# Patient Record
Sex: FEMALE | Race: WHITE | Employment: OTHER | URBAN - METROPOLITAN AREA
[De-identification: names, ages, dates, MRNs, and addresses within clinical notes are randomized per-mention and may not be internally consistent; named-entity substitution may affect disease eponyms.]

---

## 2017-03-23 ENCOUNTER — OFFICE VISIT (OUTPATIENT)
Dept: INTERNAL MEDICINE CLINIC | Age: 67
End: 2017-03-23

## 2017-03-23 VITALS
TEMPERATURE: 97.5 F | RESPIRATION RATE: 18 BRPM | OXYGEN SATURATION: 97 % | HEART RATE: 55 BPM | SYSTOLIC BLOOD PRESSURE: 132 MMHG | HEIGHT: 64 IN | WEIGHT: 167.2 LBS | BODY MASS INDEX: 28.54 KG/M2 | DIASTOLIC BLOOD PRESSURE: 83 MMHG

## 2017-03-23 DIAGNOSIS — Z12.31 ENCOUNTER FOR SCREENING MAMMOGRAM FOR BREAST CANCER: ICD-10-CM

## 2017-03-23 DIAGNOSIS — M85.80 AGE-RELATED BONE LOSS: ICD-10-CM

## 2017-03-23 DIAGNOSIS — R73.09 ELEVATED GLUCOSE: ICD-10-CM

## 2017-03-23 DIAGNOSIS — E53.8 B12 DEFICIENCY: ICD-10-CM

## 2017-03-23 DIAGNOSIS — I10 ESSENTIAL HYPERTENSION: Primary | ICD-10-CM

## 2017-03-23 DIAGNOSIS — L65.9 HAIR THINNING: ICD-10-CM

## 2017-03-23 DIAGNOSIS — M85.80 OSTEOPENIA: ICD-10-CM

## 2017-03-23 DIAGNOSIS — Z00.00 GENERAL MEDICAL EXAM: ICD-10-CM

## 2017-03-23 DIAGNOSIS — Z11.59 NEED FOR HEPATITIS C SCREENING TEST: ICD-10-CM

## 2017-03-23 DIAGNOSIS — Z13.220 SCREENING FOR CHOLESTEROL LEVEL: ICD-10-CM

## 2017-03-23 DIAGNOSIS — E78.00 HIGH CHOLESTEROL: ICD-10-CM

## 2017-03-23 RX ORDER — TRAMADOL HYDROCHLORIDE 50 MG/1
50 TABLET ORAL
Qty: 30 TAB | Refills: 1 | Status: SHIPPED | OUTPATIENT
Start: 2017-03-23 | End: 2017-10-27 | Stop reason: SDUPTHER

## 2017-03-23 RX ORDER — VALSARTAN AND HYDROCHLOROTHIAZIDE 320; 25 MG/1; MG/1
1 TABLET, FILM COATED ORAL DAILY
COMMUNITY
End: 2017-10-27 | Stop reason: SDUPTHER

## 2017-03-23 NOTE — PROGRESS NOTES
Chief Complaint   Patient presents with   [facility] Establish Care     new patient     1. Have you been to the ER, urgent care clinic since your last visit? Hospitalized since your last visit? No    2. Have you seen or consulted any other health care providers outside of the [address] [place] [address] since your last visit? Include any pap smears or colon screening.  No

## 2017-03-23 NOTE — PROGRESS NOTES
Ms. Fabby Mayes is a new patient who is here to establish care. CC:  Establish Care (new patient)  Back pain        HPI:  Previous MD lost license - Dr Thai Mathews     HTN: currently on diovan- HCT. Tolerating medication well. Denies chest pain and shortness of breath. BP well controlled. Respiratory complaints: patient feels that at times has difficulty taking deep breaths. Had allergy testing in the past -  Allergy testing was negative. lung nodule hx on an x ray - repeat x ray was negative - I reviewed report. Currently not having dyspnea. Teague\"s esophagus? ? Questionable on endoscopy - 2cm - Dr Socorro Henriquez MD - doubts true barretts. on August 2014 - patient stopped PPI on her own. Advised to discuss with him. Denies acid reflux symptoms   Nausea hx: taken omeprazole in the past. Currently not having symptoms. Dr Wili Fountain did endoscopy. See above    Vertigo hx: worked up with MRI brain and  normal. Last type had symptosm of vertigo - 1 year ago. NICOLÁS: uses the CPAP daily. Cataract hx / Glaucoma - patient is scheduled to see eye MD - plans to have May 10th - cataract surgery -     Elevated cholesterol: on crestor - last checked one year ago ahd LDL was 87 and HDL was 64    Back pain: chronic for years. Had injury as child fell of a swing and after having children patient started having back pain. Patient had imaging in the past. \" has degeneration of disc\". Pain is triggered by cleaning house. Comes and goes. Stretching helps. Ultram helped in the past.   Taking ultram when pain is severe - last taken 1.5 weeks ago. Tylenol does not relieve pain. Advil helps. Discussed correct way to take it with food for no longer than 1 week given barretts esophagus. If pain worsens patient would want to see back specialist       May 10th - cataract surgery -   Negative stress test in 2015    Health maintenance  Last pap smear several years ago - no cervix.   Complete hysterectomy Last colonoscopy done in 2015 - normal   Last mammogram 2 years ago and normal  Last dexa bone scan on 09/19/2016 - osteopenia of the hip     Review of systems:  Constitutional: negative for fever, chills, weight loss, night sweats   Eyes : has decreased vision due to cataracts surgery is scheduled  Nose and Throat: negative for tinnitus, sore throat   Reports hair thining   Cardiovascular: negative for chest pain, palpitations and shortness of breath  Respiratory: see HPI   Gastroinstestinal: negative for abdominal pain, nausea, vomiting, diarrhea, constipation, and blood in the stool  Musculoskeletal: see HPI   Genitourinary: negative for dysuria, nocturia, polyuria and hematuria   Neurologic: Negative for focal weakness, numbness or incoordination  Skin: negative for rash, pruritus  Hematologic: negative for easy bruising      Past Medical History:   Diagnosis Date    GERD (gastroesophageal reflux disease)     Glaucoma     H/O seasonal allergies     High cholesterol     Hypertension     Sleep apnea         No past surgical history on file. Allergies   Allergen Reactions    Codeine Nausea and Vomiting    Thiuram Analogues Rash       Current Outpatient Prescriptions on File Prior to Visit   Medication Sig Dispense Refill    rosuvastatin (CRESTOR) 10 mg tablet Take 10 mg by mouth nightly.  timolol (TIMOPTIC) 0.5 % ophthalmic solution Administer 1 Drop to both eyes two (2) times a day.  aspirin 81 mg chewable tablet Take 81 mg by mouth daily.  omega-3 fatty acids-vitamin e (FISH OIL) 1,000 mg cap Take 3 Caps by mouth daily.  calcium carbonate (OS-VJ) 500 mg calcium (1,250 mg) tablet Take 1 Tab by mouth daily.  zolpidem (AMBIEN) 5 mg tablet Take 5 mg by mouth nightly as needed for Sleep.  albuterol (PROVENTIL HFA, VENTOLIN HFA, PROAIR HFA) 90 mcg/actuation inhaler Take 1 Puff by inhalation every four (4) hours as needed for Wheezing.       omeprazole (PRILOSEC) 40 mg capsule Take 40 mg by mouth daily.  benazepril (LOTENSIN) 20 mg tablet Take 20 mg by mouth daily. No current facility-administered medications on file prior to visit. family history is not on file. Social History     Social History    Marital status:      Spouse name: N/A    Number of children: N/A    Years of education: N/A     Occupational History    Not on file. Social History Main Topics    Smoking status: Never Smoker    Smokeless tobacco: Not on file    Alcohol use Yes    Drug use: No    Sexual activity: Not on file     Other Topics Concern    Not on file     Social History Narrative       Visit Vitals    /83 (BP 1 Location: Right arm, BP Patient Position: Sitting)    Pulse (!) 55    Temp 97.5 °F (36.4 °C) (Oral)    Resp 18    Ht 5' 4\" (1.626 m)    Wt 167 lb 3.2 oz (75.8 kg)    SpO2 97%    BMI 28.7 kg/m2     General:  Well appearing female no acute distress  HEENT:   PERRL,normal conjunctiva. External ear and canals normal, TMs normal.  Hearing normal to voice. Nose without edema or discharge, normal septum. Lips, teeth, gums normal.  Oropharynx: no erythema, no exudates, no lesions, normal tongue. Neck:  Supple. Thyroid normal size, nontender, without nodules. No carotid bruit. No masses or lymphadenopathy  Respiratory: no respiratory distress,  no wheezing, no rhonchi, no rales. No chest wall tenderness. Cardiovascular:  RRR, normal S1S2, no murmur. Gastrointestinal: normal bowel sounds, soft, nontender, without masses. No hepatosplenomegaly. Extremities +2 pulses, no edema, normal sensation   Musculoskeletal:  Normal gait. Normal digits and nails. Normal strength and tone, no atrophy, and no abnormal movement. Skin:  No rash, no lesions, no ulcers. Skin warm, normal turgor, without induration or nodules. Neuro:  A and OX4, fluent speech, cranial nerves normal 2-12. Sensation normal to light touch.   DTR symmetrical  Psych:  Normal affect      Lab Results   Component Value Date/Time    WBC 6.0 10/26/2015 06:31 PM    HGB 14.6 10/26/2015 06:31 PM    HCT 42.9 10/26/2015 06:31 PM    PLATELET 541 44/51/8948 06:31 PM    MCV 92.5 10/26/2015 06:31 PM     Lab Results   Component Value Date/Time    Sodium 144 10/26/2015 06:31 PM    Potassium 3.7 10/26/2015 06:31 PM    Chloride 109 10/26/2015 06:31 PM    CO2 27 10/26/2015 06:31 PM    Anion gap 8 10/26/2015 06:31 PM    Glucose 101 10/26/2015 06:31 PM    BUN 14 10/26/2015 06:31 PM    Creatinine 0.65 10/26/2015 06:31 PM    BUN/Creatinine ratio 22 10/26/2015 06:31 PM    GFR est AA >60 10/26/2015 06:31 PM    GFR est non-AA >60 10/26/2015 06:31 PM    Calcium 9.2 10/26/2015 06:31 PM         TSH  Normal   Vitamin D 46.5 in March 2016      Assessment and Plan:      Essential hypertension  - BP is at goal 130/80 today   - continue current therapy - DIOVAN - /28  - METABOLIC PANEL, BASIC    Teague's esophagus: questionable diagosis - small area noted by GI on endoscopy per Dr Mateo Flores in 2014 - patient stopped PPI on her own. - Advised to discuss this with GI doctor - typically with Barretts should be on PPI long term/ but diagnosis was questioned on last endoscopy if patient does not need to be on PPI best to prevent further bone loss  -denies acid reflux symptoms      Elevated cholesterol   - LIPID PANEL  - continue crestor 10mg     Chronic back pain: patient cannot take NSAIDS due to Barretts esophagus  - takes tramadol PRN once a day - prescribed tramadol 50mg 30 pills     Osteopenia  - VITAMIN D, 25 HYDROXY  - needs repeat bone mass density in 09/2018  - advised to continue calcium 500mg daily     Cataract/glaucoma  - patient is planning to have cataract sx in May 2017  - on timolol for glaucoma    NICOLÁS - uses CPAP     Hair thinning   Reports hx of B12 abnormality - will check B12/ and TSH/T4     Encounter for screening mammogram for breast cancer  - [facility] MAMMO BI SCREENING INCL CAD;  Future  Follow-up Disposition: Not on Dinah Varela MD

## 2017-03-23 NOTE — PATIENT INSTRUCTIONS
Hyperlipidemia: After Your Visit  Your Care Instructions  Hyperlipidemia is too much fat in your blood. The body has several kinds of fat, including cholesterol and triglycerides. Your body needs fat for many things, such as making new cells. But too much fat in your blood increases your chances of having a heart attack or stroke. You may be able to lower your cholesterol and triglycerides with a heart-healthy diet, exercise, and if needed, medicine. Your doctor may want you to try lifestyle changes first to see whether they lower the fat in your blood. You may need to take medicine if lifestyle changes do not lower the fat in your blood enough. Follow-up care is a key part of your treatment and safety. Be sure to make and go to all appointments, and call your doctor if you are having problems. Its also a good idea to know your test results and keep a list of the medicines you take. How can you care for yourself at home? Take your medicines  · Take your medicines exactly as prescribed. Call your doctor if you think you are having a problem with your medicine. · If you take medicine to lower your cholesterol, go to follow-up visits. You will need to have blood tests. · Do not take large doses of niacin, which is a B vitamin, while taking medicine called statins. It may increase the chance of muscle pain and liver problems. · Talk to your doctor about avoiding grapefruit juice if you are taking statins. Grapefruit juice can raise the level of this medicine in your blood. This could increase side effects. Eat more fruits, vegetables, and fiber  · Fruits and vegetables have lots of nutrients that help protect against heart disease, and they have little--if any--fat. Try to eat at least five servings a day. Dark green, deep orange, or yellow fruits and vegetables are healthy choices. · Keep carrots, celery, and other veggies handy for snacks.  Buy fruit that is in season and store it where you can see it so that you will be tempted to eat it. Cook dishes that have a lot of veggies in them, such as stir-fries and soups. · Foods high in fiber may reduce your cholesterol and provide important vitamins and minerals. High-fiber foods include whole-grain cereals and breads, oatmeal, beans, brown rice, citrus fruits, and apples. · Buy whole-grain breads and cereals instead of white bread and pastries. Limit saturated fat  · Read food labels and try to avoid saturated fat and trans fat. They increase your risk of heart disease. · Use olive or canola oil when you cook. Try cholesterol-lowering spreads, such as Benecol or Take Control. · Bake, broil, grill, or steam foods instead of frying them. · Limit the amount of high-fat meats you eat, including hot dogs and sausages. Cut out all visible fat when you prepare meat. · Eat fish, skinless poultry, and soy products such as tofu instead of high-fat meats. Soybeans may be especially good for your heart. Eat at least two servings of fish a week. Certain fish, such as salmon, contain omega-3 fatty acids, which may help reduce your risk of heart attack. · Choose low-fat or fat-free milk and dairy products. Get exercise, limit alcohol, and quit smoking  · Get more exercise. Work with your doctor to set up an exercise program. Even if you can do only a small amount, exercise will help you get stronger, have more energy, and manage your weight and your stress. Walking is an easy way to get exercise. Gradually increase the amount you walk every day. Aim for at least 30 minutes on most days of the week. You also may want to swim, bike, or do other activities. · Limit alcohol to no more than 2 drinks a day for men and 1 drink a day for women. · Do not smoke. If you need help quitting, talk to your doctor about stop-smoking programs and medicines. These can increase your chances of quitting for good. When should you call for help?   Call 911 anytime you think you may need emergency care. For example, call if:  · You have symptoms of a heart attack. These may include:  ¨ Chest pain or pressure, or a strange feeling in the chest.  ¨ Sweating. ¨ Shortness of breath. ¨ Nausea or vomiting. ¨ Pain, pressure, or a strange feeling in the back, neck, jaw, or upper belly or in one or both shoulders or arms. ¨ Lightheadedness or sudden weakness. ¨ A fast or irregular heartbeat. After you call 911, the  may tell you to chew 1 adult-strength or 2 to 4 low-dose aspirin. Wait for an ambulance. Do not try to drive yourself. · You have signs of a stroke. These may include:  ¨ Sudden numbness, paralysis, or weakness in your face, arm, or leg, especially on only one side of your body. ¨ New problems with walking or balance. ¨ Sudden vision changes. ¨ Drooling or slurred speech. ¨ New problems speaking or understanding simple statements, or feeling confused. ¨ A sudden, severe headache that is different from past headaches. · You passed out (lost consciousness). Call your doctor now or seek immediate medical care if:  · You have muscle pain or weakness. Watch closely for changes in your health, and be sure to contact your doctor if:  · You are very tired. · You have an upset stomach, gas, constipation, or belly pain or cramps. Where can you learn more? Go to [employer]  Enter C406 in the search box to learn more about \"Hyperlipidemia: After Your Visit. \"   © 4216-0033 [employer]. Care instructions adapted under license by [employer] Prince (which disclaims liability or warranty for this information). This care instruction is for use with your licensed healthcare professional. If you have questions about a medical condition or this instruction, always ask your healthcare professional. [address] any warranty or liability for your use of this information.   Content Version: 3.4.347669; Last Revised: October 13, 2011

## 2017-03-23 NOTE — MR AVS SNAPSHOT
Visit Information Date & Time Provider Department Dept. Phone Encounter #  
 3/23/2017 10:00 AM Trudy Ceballos, [address] [phone] [id number] Follow-up Instructions Return in about 6 months (around 9/23/2017) for HTN, elevated cholesterol . Upcoming Health Maintenance Date Due Hepatitis C Screening 1950 DTaP/Tdap/Td series (1 - Tdap) 7/31/1972 ZOSTER VACCINE AGE 60> 7/31/2011 GLAUCOMA SCREENING Q2Y 7/31/2016 Pneumococcal 65+ Low/Medium Risk (1 of 2 - PCV13) 7/31/2016 MEDICARE YEARLY EXAM 7/31/2016 BREAST CANCER SCRN MAMMOGRAM 1/9/2017 COLONOSCOPY 7/10/2025 Allergies as of 3/23/2017  Review Complete On: 3/23/2017 By: Chuck Bates Severity Noted Reaction Type Reactions Codeine  10/26/2015    Nausea and Vomiting Thiuram Analogues  03/23/2017    Rash Current Immunizations  Reviewed on 10/27/2015 Name Date Influenza Vaccine (Quad) PF 10/27/2015  5:27 PM  
  
 Not reviewed this visit You Were Diagnosed With   
  
 Codes Comments Essential hypertension    -  Primary ICD-10-CM: I10 
ICD-9-CM: 401.9 Screening for cholesterol level     ICD-10-CM: Z13.220 ICD-9-CM: V77.91 General medical exam     ICD-10-CM: Z00.00 ICD-9-CM: V70.9 Elevated glucose     ICD-10-CM: R73.09 
ICD-9-CM: 790.29 Screening for colon cancer     ICD-10-CM: Z12.11 ICD-9-CM: V76.51 Encounter for screening mammogram for breast cancer     ICD-10-CM: Z12.31 
ICD-9-CM: V76.12 Age-related bone loss     ICD-10-CM: M85.80 ICD-9-CM: 733.90   
 B12 deficiency     ICD-10-CM: E53.8 ICD-9-CM: 266.2 Hair thinning     ICD-10-CM: L65.9 ICD-9-CM: 704.00 Vitals BP Pulse Temp Resp Height(growth percentile) Weight(growth percentile) 132/83 (BP 1 Location: Right arm, BP Patient Position: Sitting) (!) 55 97.5 °F (36.4 °C) (Oral) 18 5' 4\" (1.626 m) 167 lb 3.2 oz (75.8 kg) SpO2 BMI OB Status Smoking Status 97% 28.7 kg/m2 Hysterectomy Never Smoker BMI and BSA Data Body Mass Index Body Surface Area 28.7 kg/m 2 1.85 m 2 Preferred Pharmacy Pharmacy Name Phone [facility]. [phone] Your Updated Medication List  
  
   
This list is accurate as of: 3/23/17 10:59 AM.  Always use your most recent med list.  
  
  
  
  
 albuterol 90 mcg/actuation inhaler Commonly known as:  PROVENTIL HFA, VENTOLIN HFA, PROAIR HFA Take 1 Puff by inhalation every four (4) hours as needed for Wheezing. AMBIEN 5 mg tablet Generic drug:  zolpidem Take 5 mg by mouth nightly as needed for Sleep. aspirin 81 mg chewable tablet Take 81 mg by mouth daily. calcium carbonate 500 mg calcium (1,250 mg) tablet Commonly known as:  OS-VJ Take 1 Tab by mouth daily. CRESTOR 10 mg tablet Generic drug:  rosuvastatin Take 10 mg by mouth nightly. FISH OIL 1,000 mg Cap Generic drug:  omega-3 fatty acids-vitamin e Take 3 Caps by mouth daily. timolol 0.5 % ophthalmic solution Commonly known as:  TIMOPTIC Administer 1 Drop to both eyes two (2) times a day. traMADol 50 mg tablet Commonly known as:  ULTRAM  
Take 1 Tab by mouth every twelve (12) hours as needed for Pain. Max Daily Amount: 100 mg.  
  
 valsartan-hydroCHLOROthiazide 320-25 mg per tablet Commonly known as:  DIOVAN-HCT Take 1 Tab by mouth daily. Prescriptions Printed Refills  
 traMADol (ULTRAM) 50 mg tablet 1 Sig: Take 1 Tab by mouth every twelve (12) hours as needed for Pain. Max Daily Amount: 100 mg. Class: Print Route: Oral  
  
We Performed the Following CBC WITH AUTOMATED DIFF [54979 CPT(R)] HEMOGLOBIN A1C WITH EAG [29022 CPT(R)] HEPATIC FUNCTION PANEL [42123 CPT(R)] LIPID PANEL [62356 CPT(R)] METABOLIC PANEL, BASIC [62120 CPT(R)] TSH AND FREE T4 [96666 CPT(R)] VITAMIN B12 & FOLATE [33095 CPT(R)] VITAMIN D, 25 HYDROXY [id number] CPT(R)] Follow-up Instructions Return in about 6 months (around 9/23/2017) for HTN, elevated cholesterol . To-Do List   
 03/23/2017 Imaging:  [facility] BI SCREENING INCL CAD Patient Instructions Hyperlipidemia: After Your Visit Your Care Instructions Hyperlipidemia is too much fat in your blood. The body has several kinds of fat, including cholesterol and triglycerides. Your body needs fat for many things, such as making new cells. But too much fat in your blood increases your chances of having a heart attack or stroke. You may be able to lower your cholesterol and triglycerides with a heart-healthy diet, exercise, and if needed, medicine. Your doctor may want you to try lifestyle changes first to see whether they lower the fat in your blood. You may need to take medicine if lifestyle changes do not lower the fat in your blood enough. Follow-up care is a key part of your treatment and safety. Be sure to make and go to all appointments, and call your doctor if you are having problems. Its also a good idea to know your test results and keep a list of the medicines you take. How can you care for yourself at home? Take your medicines · Take your medicines exactly as prescribed. Call your doctor if you think you are having a problem with your medicine. · If you take medicine to lower your cholesterol, go to follow-up visits. You will need to have blood tests. · Do not take large doses of niacin, which is a B vitamin, while taking medicine called statins. It may increase the chance of muscle pain and liver problems. · Talk to your doctor about avoiding grapefruit juice if you are taking statins. Grapefruit juice can raise the level of this medicine in your blood. This could increase side effects. Eat more fruits, vegetables, and fiber · Fruits and vegetables have lots of nutrients that help protect against heart disease, and they have littleif anyfat. Try to eat at least five servings a day. Dark green, deep orange, or yellow fruits and vegetables are healthy choices. · Keep carrots, celery, and other veggies handy for snacks. Buy fruit that is in season and store it where you can see it so that you will be tempted to eat it. Cook dishes that have a lot of veggies in them, such as stir-fries and soups. · Foods high in fiber may reduce your cholesterol and provide important vitamins and minerals. High-fiber foods include whole-grain cereals and breads, oatmeal, beans, brown rice, citrus fruits, and apples. · Buy whole-grain breads and cereals instead of white bread and pastries. Limit saturated fat · Read food labels and try to avoid saturated fat and trans fat. They increase your risk of heart disease. · Use olive or canola oil when you cook. Try cholesterol-lowering spreads, such as Benecol or Take Control. · Bake, broil, grill, or steam foods instead of frying them. · Limit the amount of high-fat meats you eat, including hot dogs and sausages. Cut out all visible fat when you prepare meat. · Eat fish, skinless poultry, and soy products such as tofu instead of high-fat meats. Soybeans may be especially good for your heart. Eat at least two servings of fish a week. Certain fish, such as salmon, contain omega-3 fatty acids, which may help reduce your risk of heart attack. · Choose low-fat or fat-free milk and dairy products. Get exercise, limit alcohol, and quit smoking · Get more exercise. Work with your doctor to set up an exercise program. Even if you can do only a small amount, exercise will help you get stronger, have more energy, and manage your weight and your stress. Walking is an easy way to get exercise. Gradually increase the amount you walk every day. Aim for at least 30 minutes on most days of the week. You also may want to swim, bike, or do other activities. · Limit alcohol to no more than 2 drinks a day for men and 1 drink a day for women. · Do not smoke. If you need help quitting, talk to your doctor about stop-smoking programs and medicines. These can increase your chances of quitting for good. When should you call for help? Call 911 anytime you think you may need emergency care. For example, call if: 
· You have symptoms of a heart attack. These may include: ¨ Chest pain or pressure, or a strange feeling in the chest. 
¨ Sweating. ¨ Shortness of breath. ¨ Nausea or vomiting. ¨ Pain, pressure, or a strange feeling in the back, neck, jaw, or upper belly or in one or both shoulders or arms. ¨ Lightheadedness or sudden weakness. ¨ A fast or irregular heartbeat. After you call 911, the  may tell you to chew 1 adult-strength or 2 to 4 low-dose aspirin. Wait for an ambulance. Do not try to drive yourself. · You have signs of a stroke. These may include: 
¨ Sudden numbness, paralysis, or weakness in your face, arm, or leg, especially on only one side of your body. ¨ New problems with walking or balance. ¨ Sudden vision changes. ¨ Drooling or slurred speech. ¨ New problems speaking or understanding simple statements, or feeling confused. ¨ A sudden, severe headache that is different from past headaches. · You passed out (lost consciousness). Call your doctor now or seek immediate medical care if: 
· You have muscle pain or weakness. Watch closely for changes in your health, and be sure to contact your doctor if: 
· You are very tired. · You have an upset stomach, gas, constipation, or belly pain or cramps. Where can you learn more? Go to Spotwish.be Enter C406 in the search box to learn more about \"Hyperlipidemia: After Your Visit. \"  
© 0988-3285 Healthwise, Incorporated.  Care instructions adapted under license by [facility] (which disclaims liability or warranty for this information). This care instruction is for use with your licensed healthcare professional. If you have questions about a medical condition or this instruction, always ask your healthcare professional. [address] any warranty or liability for your use of this information. Content Version: 7.3.698806; Last Revised: October 13, 2011 Introducing [facility]! Dear Britt Ariza: Thank you for requesting a Social[employer] account. Our records indicate that you already have an active SocialDial account. You can access your account anytime at [email or web address]. [email or web address] Did you know that you can access your hospital and ER discharge instructions at any time in SocialDial? You can also review all of your test results from your hospital stay or ER visit. Additional Information If you have questions, please visit the Frequently Asked Questions section of the SocialDial website at [email or web address] Remember, SocialDial is NOT to be used for urgent needs. For medical emergencies, dial 911. Now available from your iPhone and Android! Please provide this summary of care documentation to your next provider. Your primary care clinician is listed as LANI Robles. If you have any questions after today's visit, please call [phone].

## 2017-03-23 NOTE — LETTER
3/30/2017 4:34 PM 
 
Ms. Indio Portermojosette [address] [address] Dear Zhou Lopez: Please find your most recent results below. Resulted Orders LIPID PANEL Result Value Ref Range Cholesterol, total 162 100 - 199 mg/dL Triglyceride 162 (H) 0 - 149 mg/dL HDL Cholesterol 52 >39 mg/dL VLDL, calculated 32 5 - 40 mg/dL LDL, calculated 78 0 - 99 mg/dL Narrative Performed at:  [address]  [id number] : Darletta Dancer MD, Phone:  [phone] METABOLIC PANEL, BASIC Result Value Ref Range Glucose 94 65 - 99 mg/dL BUN 17 8 - 27 mg/dL Creatinine 0.64 0.57 - 1.00 mg/dL GFR est non-AA 94 >59 mL/min/1.73 GFR est  >59 mL/min/1.73  
 BUN/Creatinine ratio 27 (H) 11 - 26 Sodium 140 134 - 144 mmol/L Potassium 4.0 3.5 - 5.2 mmol/L Chloride 99 96 - 106 mmol/L  
 CO2 26 18 - 29 mmol/L Calcium 9.7 8.7 - 10.3 mg/dL Narrative Performed at:  [address]  [id number] : Darletta Dancer MD, Phone:  [phone] HEMOGLOBIN A1C WITH EAG Result Value Ref Range Hemoglobin A1c 6.1 (H) 4.8 - 5.6 % Comment:  
            Pre-diabetes: 5.7 - 6.4 Diabetes: >6.4 Glycemic control for adults with diabetes: <7.0 Estimated average glucose 128 mg/dL Narrative Performed at:  [address]  [id number] : Darletta Dancer MD, Phone:  [phone] HEPATIC FUNCTION PANEL Result Value Ref Range Protein, total 7.3 6.0 - 8.5 g/dL Albumin 4.4 3.6 - 4.8 g/dL Bilirubin, total 0.4 0.0 - 1.2 mg/dL Bilirubin, direct 0.11 0.00 - 0.40 mg/dL Alk. phosphatase 49 39 - 117 IU/L  
 AST (SGOT) 28 0 - 40 IU/L  
 ALT (SGPT) 19 0 - 32 IU/L Narrative Performed at:  [address]  [id number] : Chrissy Sullivan MD, Phone:  [phone] CBC WITH AUTOMATED DIFF Result Value Ref Range WBC 4.9 3.4 - 10.8 x10E3/uL  
 RBC 4.49 3.77 - 5.28 x10E6/uL HGB 14.2 11.1 - 15.9 g/dL HCT 43.2 34.0 - 46.6 % MCV 96 79 - 97 fL  
 MCH 31.6 26.6 - 33.0 pg  
 MCHC 32.9 31.5 - 35.7 g/dL  
 RDW 13.1 12.3 - 15.4 % PLATELET 435 493 - 873 x10E3/uL NEUTROPHILS 48 % Lymphocytes 37 % MONOCYTES 10 % EOSINOPHILS 4 % BASOPHILS 1 %  
 ABS. NEUTROPHILS 2.3 1.4 - 7.0 x10E3/uL Abs Lymphocytes 1.8 0.7 - 3.1 x10E3/uL  
 ABS. MONOCYTES 0.5 0.1 - 0.9 x10E3/uL  
 ABS. EOSINOPHILS 0.2 0.0 - 0.4 x10E3/uL  
 ABS. BASOPHILS 0.1 0.0 - 0.2 x10E3/uL IMMATURE GRANULOCYTES 0 %  
 ABS. IMM. GRANS. 0.0 0.0 - 0.1 x10E3/uL Narrative Performed at:  [address]  [id number] : Chrissy Sullivan MD, Phone:  [phone] VITAMIN D, 25 HYDROXY Result Value Ref Range VITAMIN D, 25-HYDROXY 61.2 30.0 - 100.0 ng/mL Comment:  
   Vitamin D deficiency has been defined by the [address] [place] [address] practice guideline as a 
level of serum 25-OH vitamin D less than 20 ng/mL (1,2). The Endocrine Society went on to further define vitamin D 
insufficiency as a level between 21 and 29 ng/mL (2). 1. IOM ([place] of Medicine). 2010. Dietary reference 
   intakes for calcium and D. [facility]: The 
   [employer]. 2. Jimmie MF, Evelina ESCOBAR, Hermelinda VELASQUEZ, et al. 
   Evaluation, treatment, and prevention of vitamin D 
   deficiency: an Endocrine Society clinical practice 
   guideline. JCEM. 2011 Jul; 96(7):1911-30. Narrative Performed at:  [address]  [id number] : Chrissy Sullivan MD, Phone:  [phone] VITAMIN B12 & FOLATE Result Value Ref Range Vitamin B12 931 211 - 946 pg/mL Folate >20.0 >3.0 ng/mL Comment: A serum folate concentration of less than 3.1 ng/mL is 
considered to represent clinical deficiency. Narrative Performed at:  [address]  [id number] : Deborah James MD, Phone:  [phone] TSH AND FREE T4 Result Value Ref Range TSH 1.340 0.450 - 4.500 uIU/mL T4, Free 1.27 0.82 - 1.77 ng/dL Narrative Performed at:  [address]  [id number] : Deborah James MD, Phone:  [phone] HEPATITIS C AB Result Value Ref Range Hep C Virus Ab <0.1 0.0 - 0.9 s/co ratio Comment:  
                                     Negative:     < 0.8 Indeterminate: 0.8 - 0.9 Positive:     > 0.9 The CDC recommends that a positive HCV antibody result 
 be followed up with a HCV Nucleic Acid Amplification 
 test (617592). Narrative Performed at:  [address]  [id number] : Deborah James MD, Phone:  [phone] RECOMMENDATIONS: 
Cholesterol is at goal, triglycerides are slightly elevated which can be lowered by increasing exercise to 30 minutes per day and lowering fat content in diet. Continue crestor current dose. You have prediabetes with elevated sugar - Recommend lifestyle modifications - decrease sugar and carbohydrate intake ( less bread, pasta and rice) - we will repeat this at your follow up apt in 3 months - if still elevated then we will discuss starting a medication to prevent diabetes Hep C is negative Thyroid, liver function, kidney fx are normal, Hep C testing is negative Vitamin B12 is within normal limits no  Additional supplementation is needed Vitamin D is appropiate- advise -  Calcium 500mg daily  and 400 units of vitamin D Please call me if you have any questions: [phone] Sincerely, 
 
 
 Belkis Bryant MD

## 2017-03-24 LAB
25(OH)D3+25(OH)D2 SERPL-MCNC: 61.2 NG/ML (ref 30–100)
ALBUMIN SERPL-MCNC: 4.4 G/DL (ref 3.6–4.8)
ALP SERPL-CCNC: 49 IU/L (ref 39–117)
ALT SERPL-CCNC: 19 IU/L (ref 0–32)
AST SERPL-CCNC: 28 IU/L (ref 0–40)
BASOPHILS # BLD AUTO: 0.1 X10E3/UL (ref 0–0.2)
BASOPHILS NFR BLD AUTO: 1 %
BILIRUB DIRECT SERPL-MCNC: 0.11 MG/DL (ref 0–0.4)
BILIRUB SERPL-MCNC: 0.4 MG/DL (ref 0–1.2)
BUN SERPL-MCNC: 17 MG/DL (ref 8–27)
BUN/CREAT SERPL: 27 (ref 11–26)
CALCIUM SERPL-MCNC: 9.7 MG/DL (ref 8.7–10.3)
CHLORIDE SERPL-SCNC: 99 MMOL/L (ref 96–106)
CHOLEST SERPL-MCNC: 162 MG/DL (ref 100–199)
CO2 SERPL-SCNC: 26 MMOL/L (ref 18–29)
CREAT SERPL-MCNC: 0.64 MG/DL (ref 0.57–1)
EOSINOPHIL # BLD AUTO: 0.2 X10E3/UL (ref 0–0.4)
EOSINOPHIL NFR BLD AUTO: 4 %
ERYTHROCYTE [DISTWIDTH] IN BLOOD BY AUTOMATED COUNT: 13.1 % (ref 12.3–15.4)
EST. AVERAGE GLUCOSE BLD GHB EST-MCNC: 128 MG/DL
FOLATE SERPL-MCNC: >20 NG/ML
GLUCOSE SERPL-MCNC: 94 MG/DL (ref 65–99)
HBA1C MFR BLD: 6.1 % (ref 4.8–5.6)
HCT VFR BLD AUTO: 43.2 % (ref 34–46.6)
HCV AB S/CO SERPL IA: <0.1 S/CO RATIO (ref 0–0.9)
HDLC SERPL-MCNC: 52 MG/DL
HGB BLD-MCNC: 14.2 G/DL (ref 11.1–15.9)
IMM GRANULOCYTES # BLD: 0 X10E3/UL (ref 0–0.1)
IMM GRANULOCYTES NFR BLD: 0 %
LDLC SERPL CALC-MCNC: 78 MG/DL (ref 0–99)
LYMPHOCYTES # BLD AUTO: 1.8 X10E3/UL (ref 0.7–3.1)
LYMPHOCYTES NFR BLD AUTO: 37 %
MCH RBC QN AUTO: 31.6 PG (ref 26.6–33)
MCHC RBC AUTO-ENTMCNC: 32.9 G/DL (ref 31.5–35.7)
MCV RBC AUTO: 96 FL (ref 79–97)
MONOCYTES # BLD AUTO: 0.5 X10E3/UL (ref 0.1–0.9)
MONOCYTES NFR BLD AUTO: 10 %
NEUTROPHILS # BLD AUTO: 2.3 X10E3/UL (ref 1.4–7)
NEUTROPHILS NFR BLD AUTO: 48 %
PLATELET # BLD AUTO: 366 X10E3/UL (ref 150–379)
POTASSIUM SERPL-SCNC: 4 MMOL/L (ref 3.5–5.2)
PROT SERPL-MCNC: 7.3 G/DL (ref 6–8.5)
RBC # BLD AUTO: 4.49 X10E6/UL (ref 3.77–5.28)
SODIUM SERPL-SCNC: 140 MMOL/L (ref 134–144)
T4 FREE SERPL-MCNC: 1.27 NG/DL (ref 0.82–1.77)
TRIGL SERPL-MCNC: 162 MG/DL (ref 0–149)
TSH SERPL DL<=0.005 MIU/L-ACNC: 1.34 UIU/ML (ref 0.45–4.5)
VIT B12 SERPL-MCNC: 931 PG/ML (ref 211–946)
VLDLC SERPL CALC-MCNC: 32 MG/DL (ref 5–40)
WBC # BLD AUTO: 4.9 X10E3/UL (ref 3.4–10.8)

## 2017-03-27 NOTE — PROGRESS NOTES
Please notify patient that cholesterol is at goal, triglycerides are slightly elevated which can be lowered by increasing exercise to 30 minutes per day and lowering fat content in diet. Continue crestor current dose.   You have prediabetes with elevated sugar - Recommend lifestyle modifications - decrease sugar and carbohydrate intake ( less bread, pasta and rice) - we will repeat this at your follow up apt in 3 months - if still elevated then we will discuss starting a medication to prevent diabetes  Hep C is negative  Thyroid, liver function, kidney fx are normal, Hep C testing is negative  Vitamin B12 is within normal limits no  Additional supplementation is needed  Vitamin D is appropiate- advise -  Calcium 500mg daily  and 400 units of vitamin D

## 2017-03-30 NOTE — PROGRESS NOTES
Reviewed results with patient per Dr. Carmen Land. I have reviewed the provider's instructions with the patient, answering all questions to her satisfaction.

## 2017-04-14 ENCOUNTER — HOSPITAL ENCOUNTER (OUTPATIENT)
Dept: MAMMOGRAPHY | Age: 67
Discharge: HOME | End: 2017-04-14
Attending: INTERNAL MEDICINE
Payer: MEDICARE

## 2017-04-14 DIAGNOSIS — Z12.31 ENCOUNTER FOR SCREENING MAMMOGRAM FOR BREAST CANCER: ICD-10-CM

## 2017-04-14 PROCEDURE — 77067 SCR MAMMO BI INCL CAD: CPT

## 2017-04-17 NOTE — PROGRESS NOTES
Spoke with patient , let patient know that patients Normal mammogram - repeat in 1 year. Asked patient if any questions or concerns at the time. Pt stated no.

## 2017-05-05 ENCOUNTER — OFFICE VISIT (OUTPATIENT)
Dept: INTERNAL MEDICINE CLINIC | Age: 67
End: 2017-05-05

## 2017-05-05 VITALS
HEIGHT: 64 IN | HEART RATE: 52 BPM | DIASTOLIC BLOOD PRESSURE: 83 MMHG | BODY MASS INDEX: 28.13 KG/M2 | WEIGHT: 164.8 LBS | TEMPERATURE: 98 F | OXYGEN SATURATION: 98 % | SYSTOLIC BLOOD PRESSURE: 133 MMHG | RESPIRATION RATE: 18 BRPM

## 2017-05-05 DIAGNOSIS — R68.89 FEELING UNWELL: ICD-10-CM

## 2017-05-05 DIAGNOSIS — R00.1 BRADYCARDIA: ICD-10-CM

## 2017-05-05 DIAGNOSIS — R11.0 NAUSEA: Primary | ICD-10-CM

## 2017-05-05 RX ORDER — OMEPRAZOLE 20 MG/1
40 CAPSULE, DELAYED RELEASE ORAL DAILY
Qty: 60 CAP | Refills: 2 | Status: SHIPPED | OUTPATIENT
Start: 2017-05-05 | End: 2017-09-20 | Stop reason: SDUPTHER

## 2017-05-05 RX ORDER — TIMOLOL MALEATE 5 MG/ML
SOLUTION OPHTHALMIC
COMMUNITY
Start: 2017-03-22 | End: 2017-05-26

## 2017-05-05 RX ORDER — CLOBETASOL PROPIONATE 0.5 MG/G
OINTMENT TOPICAL
COMMUNITY
Start: 2017-04-14 | End: 2017-05-26

## 2017-05-05 RX ORDER — DESONIDE 0.5 MG/G
CREAM TOPICAL AS NEEDED
COMMUNITY
Start: 2017-04-07 | End: 2017-10-30

## 2017-05-05 NOTE — PROGRESS NOTES
Chief Complaint   Patient presents with    Follow-up     diabetes, feeling like she is going to faint     1. Have you been to the ER, urgent care clinic since your last visit? Hospitalized since your last visit? No    2. Have you seen or consulted any other health care providers outside of the [address] [place] [address] since your last visit? Include any pap smears or colon screening.  No

## 2017-05-05 NOTE — PATIENT INSTRUCTIONS
Take omeprazole 40mg daily 30 minutes before you eat in the morning    Eat frequent small meals with high protein     Return in one month

## 2017-05-05 NOTE — PROGRESS NOTES
[facility]  Is here for acute care     CC:  Feeling unwell/ frequent nausea        HPI:    Two weeks prior to easter patient woke up and was cooking breakfast and  Felt severe nausea -and sensation of  passing out/ low energy and laid down in bed for 2 days -  had constant nausea and decreased appetite \" thought of food made patient feel sick\"   Had sweats and chills and no fever. Felt weak   Then symptoms improved after a good night of sleep and taking some advil at night. However last Monday had recurrence of symptoms with nausea, lightheaded. Admits to eating low carbs and decreased PO intake since diagnosis of pre-diabetes  Restarted zantac two weeks ago. Reviewed with patient hx of Teague's esophagus small section 2 cm that per Dr Lacey Kuhn questioned if true Barretts in 2014 and patient stopped PPI at that time against medical advise. Vertigo hx: worked up with MRI brain and  normal. Last type had symptosm of vertigo - 1 year ago. Cataract hx / Glaucoma - patient is scheduled to see eye MD - plans to have May 10th - cataract surgery -     Had recent testing done at dermatologist for a facial rash - had EMILY comprehensive panel and negative    May 10th -has  cataract surgery scheduled -   Negative stress test in 2015    Health maintenance  Last pap smear several years ago - no cervix.   Complete hysterectomy   Last colonoscopy done in 2015 - normal   Last mammogram April 2017  Last dexa bone scan on 09/19/2016 - osteopenia of the hip     Review of systems:  12 systems reviewed and negative other than  HPI         Past Medical History:   Diagnosis Date    Teague esophagus     small segment in 2014 Dr Karen Adler GERD (gastroesophageal reflux disease)     Glaucoma     H/O seasonal allergies     High cholesterol     Hypertension     Osteopenia     Osteopenia     Sleep apnea     Vertigo         Past Surgical History:   Procedure Laterality Date    HX HYSTERECTOMY         Allergies Allergen Reactions    Codeine Nausea and Vomiting    Thiuram Analogues Rash       Current Outpatient Prescriptions on File Prior to Visit   Medication Sig Dispense Refill    valsartan-hydroCHLOROthiazide (DIOVAN-HCT) 320-25 mg per tablet Take 1 Tab by mouth daily.  traMADol (ULTRAM) 50 mg tablet Take 1 Tab by mouth every twelve (12) hours as needed for Pain. Max Daily Amount: 100 mg. 30 Tab 1    rosuvastatin (CRESTOR) 10 mg tablet Take 10 mg by mouth nightly.  timolol (TIMOPTIC) 0.5 % ophthalmic solution Administer 1 Drop to both eyes two (2) times a day.  aspirin 81 mg chewable tablet Take 81 mg by mouth daily.  omega-3 fatty acids-vitamin e (FISH OIL) 1,000 mg cap Take 3 Caps by mouth daily.  calcium carbonate (OS-VJ) 500 mg calcium (1,250 mg) tablet Take 1 Tab by mouth daily.  zolpidem (AMBIEN) 5 mg tablet Take 5 mg by mouth nightly as needed for Sleep.  albuterol (PROVENTIL HFA, VENTOLIN HFA, PROAIR HFA) 90 mcg/actuation inhaler Take 1 Puff by inhalation every four (4) hours as needed for Wheezing. No current facility-administered medications on file prior to visit. family history includes Colon Cancer (age of onset: 76) in her mother; Coronary Artery Disease (age of onset: 61) in her father. Social History     Social History    Marital status:      Spouse name: N/A    Number of children: N/A    Years of education: N/A     Occupational History    Not on file.      Social History Main Topics    Smoking status: Never Smoker    Smokeless tobacco: Not on file    Alcohol use Yes    Drug use: No    Sexual activity: Not on file     Other Topics Concern    Not on file     Social History Narrative       Visit Vitals    /83 (BP 1 Location: Right arm, BP Patient Position: Sitting)    Pulse (!) 52    Temp 98 °F (36.7 °C) (Oral)    Resp 18    Ht 5' 4\" (1.626 m)    Wt 164 lb 12.8 oz (74.8 kg)    SpO2 98%    BMI 28.29 kg/m2     General: Well appearing female no acute distress  HEENT:   PERRL,normal conjunctiva. Neck:  Supple. Respiratory: no respiratory distress,  no wheezing, no rhonchi, no rales. No chest wall tenderness. Cardiovascular:  RRR, normal S1S2, no murmur. Gastrointestinal: normal bowel sounds, soft, nontender, without masses. No hepatosplenomegaly. Extremities +2 pulses, no edema, normal sensation   Musculoskeletal:  Normal gait. Normal digits and nails. Normal strength and tone, no atrophy, and no abnormal movement. Neuro:  A and OX4, fluent speech, cranial nerves normal 2-12. Psych:  Normal affect      Lab Results   Component Value Date/Time    WBC 5.5 05/05/2017 09:20 AM    HGB 13.6 05/05/2017 09:20 AM    HCT 41.6 05/05/2017 09:20 AM    PLATELET 195 05/97/9939 09:20 AM    MCV 94 05/05/2017 09:20 AM     Lab Results   Component Value Date/Time    Sodium 141 05/05/2017 09:20 AM    Potassium 4.0 05/05/2017 09:20 AM    Chloride 100 05/05/2017 09:20 AM    CO2 26 05/05/2017 09:20 AM    Anion gap 8 10/26/2015 06:31 PM    Glucose 95 05/05/2017 09:20 AM    BUN 21 05/05/2017 09:20 AM    Creatinine 0.62 05/05/2017 09:20 AM    BUN/Creatinine ratio 34 05/05/2017 09:20 AM    GFR est  05/05/2017 09:20 AM    GFR est non-AA 95 05/05/2017 09:20 AM    Calcium 9.7 05/05/2017 09:20 AM         TSH  Normal   Vitamin D 46.5 in March 2016      Assessment and Plan:    Nausea/ anorexia: given hx of Barretts ( although mild on last endoscopy in 2014) - and patient stopped taking PPI on her own suspect this is GERD. Has a hx of gallbladder and appendix removed. Advised patient to take PPI daily and follow up in one month. Checking CMP and CBC    - omeprazole (PRILOSEC) 20 mg capsule; Take 2 Caps by mouth daily. Dispense: 60 Cap; Refill: 2  - METABOLIC PANEL, COMPREHENSIVE  - CBC W/O DIFF     Feeling unwell with lighheadedness:   Had recent lab work in Feb that was normal except for pre diabetes - patient went on strict low carb diet after diagnosis. Possibly contributing to symptoms. Advised to eat carbs ( good carbs) and protein and eat frequent meals. Will check BMP and CBC. Also has a hx of vertigo. - METABOLIC PANEL, COMPREHENSIVE  - CBC W/O DIFF    Prediabetes:  Avoiding sugar containing foods. Given symptoms above advised to liberate diet see above     Essential hypertension  - BP is at goal 130/80    - continue current therapy    Chronic back pain: patient cannot take NSAIDS due to Barretts esophagus  - takes tramadol PRN once a day - prescribed tramadol 50mg 30 pills previous visit     Osteopenia  - VITAMIN D, 25 HYDROXY  - needs repeat bone mass density in 09/2018  - advised to continue calcium 500mg daily     Cataract/glaucoma  - patient is planning to have cataract sx in May 10th 2017  - on timolol for glaucoma    NICOLÁS - uses CPAP         Follow-up Disposition:  Return in about 4 weeks (around 6/2/2017) for nausea and lightheaded .      Toñito Elizabeth MD

## 2017-05-05 NOTE — MR AVS SNAPSHOT
Visit Information Date & Time Provider Department Dept. Phone Encounter #  
 5/5/2017  8:30 AM Nickolas Samuels [address] [place] [address],[address] [phone] [id number] Follow-up Instructions Return in about 4 weeks (around 6/2/2017) for nausea and lightheaded . Upcoming Health Maintenance Date Due DTaP/Tdap/Td series (1 - Tdap) 7/31/1972 ZOSTER VACCINE AGE 60> 7/31/2011 Pneumococcal 65+ Low/Medium Risk (1 of 2 - PCV13) 7/31/2016 MEDICARE YEARLY EXAM 7/31/2016 INFLUENZA AGE 9 TO ADULT 8/1/2017 GLAUCOMA SCREENING Q2Y 3/1/2019 BREAST CANCER SCRN MAMMOGRAM 4/14/2019 COLONOSCOPY 7/10/2025 Allergies as of 5/5/2017  Review Complete On: 5/5/2017 By: Luiz Gil Severity Noted Reaction Type Reactions Codeine  10/26/2015    Nausea and Vomiting Thiuram Analogues  03/23/2017    Rash Current Immunizations  Reviewed on 10/27/2015 Name Date Influenza Vaccine (Quad) PF 10/27/2015  5:27 PM  
  
 Not reviewed this visit You Were Diagnosed With   
  
 Codes Comments Nausea    -  Primary ICD-10-CM: R11.0 ICD-9-CM: 787.02 Feeling unwell     ICD-10-CM: R68.89 ICD-9-CM: 780.99 Vitals BP Pulse Temp Resp Height(growth percentile) Weight(growth percentile) 133/83 (BP 1 Location: Right arm, BP Patient Position: Sitting) (!) 52 98 °F (36.7 °C) (Oral) 18 5' 4\" (1.626 m) 164 lb 12.8 oz (74.8 kg) SpO2 BMI OB Status Smoking Status 98% 28.29 kg/m2 Hysterectomy Never Smoker BMI and BSA Data Body Mass Index Body Surface Area  
 28.29 kg/m 2 1.84 m 2 Preferred Pharmacy Pharmacy Name Phone [facility] [phone] Your Updated Medication List  
  
   
This list is accurate as of: 5/5/17  9:09 AM.  Always use your most recent med list.  
  
  
  
  
 albuterol 90 mcg/actuation inhaler Commonly known as:  PROVENTIL HFA, VENTOLIN HFA, PROAIR HFA  
 Take 1 Puff by inhalation every four (4) hours as needed for Wheezing. AMBIEN 5 mg tablet Generic drug:  zolpidem Take 5 mg by mouth nightly as needed for Sleep. aspirin 81 mg chewable tablet Take 81 mg by mouth daily. calcium carbonate 500 mg calcium (1,250 mg) tablet Commonly known as:  OS-VJ Take 1 Tab by mouth daily. clobetasol 0.05 % ointment Commonly known as:  TEMOVATE  
  
 CRESTOR 10 mg tablet Generic drug:  rosuvastatin Take 10 mg by mouth nightly. desonide 0.05 % cream  
Commonly known as:  Clementeen Pluck FISH OIL 1,000 mg Cap Generic drug:  omega-3 fatty acids-vitamin e Take 3 Caps by mouth daily. omeprazole 20 mg capsule Commonly known as:  PRILOSEC Take 2 Caps by mouth daily. * timolol 0.5 % ophthalmic solution Commonly known as:  TIMOPTIC Administer 1 Drop to both eyes two (2) times a day. * timolol 0.5 % ophthalmic gel-forming Commonly known as:  TIMOPTIC-XE  
  
 traMADol 50 mg tablet Commonly known as:  ULTRAM  
Take 1 Tab by mouth every twelve (12) hours as needed for Pain. Max Daily Amount: 100 mg.  
  
 valsartan-hydroCHLOROthiazide 320-25 mg per tablet Commonly known as:  DIOVAN-HCT Take 1 Tab by mouth daily. * Notice: This list has 2 medication(s) that are the same as other medications prescribed for you. Read the directions carefully, and ask your doctor or other care provider to review them with you. Prescriptions Sent to Pharmacy Refills  
 omeprazole (PRILOSEC) 20 mg capsule 2 Sig: Take 2 Caps by mouth daily. Class: Normal  
 Pharmacy: [address] [place] [address] #: [phone] Route: Oral  
  
We Performed the Following CBC W/O DIFF [03678 CPT(R)] METABOLIC PANEL, COMPREHENSIVE [09317 CPT(R)] Follow-up Instructions Return in about 4 weeks (around 6/2/2017) for nausea and lightheaded . Patient Instructions Take omeprazole 40mg daily 30 minutes before you eat in the morning Eat frequent small meals with high protein Return in one month Introducing [facility]! Dear Robert Saldana: Thank you for requesting a [employer] account. Our records indicate that you already have an active [employer] account. You can access your account anytime at [email or web address] Did you know that you can access your hospital and ER discharge instructions at any time in [employer]? You can also review all of your test results from your hospital stay or ER visit. Additional Information If you have questions, please visit the Frequently Asked Questions section of the [employer] website at [email or web address] Remember, [employer] is NOT to be used for urgent needs. For medical emergencies, dial 911. Now available from your iPhone and Android! Please provide this summary of care documentation to your next provider. Your primary care clinician is listed as LANI Robles. If you have any questions after today's visit, please call [phone].

## 2017-05-06 LAB
ALBUMIN SERPL-MCNC: 4.2 G/DL (ref 3.6–4.8)
ALBUMIN/GLOB SERPL: 1.4 {RATIO} (ref 1.2–2.2)
ALP SERPL-CCNC: 44 IU/L (ref 39–117)
ALT SERPL-CCNC: 22 IU/L (ref 0–32)
AST SERPL-CCNC: 25 IU/L (ref 0–40)
BILIRUB SERPL-MCNC: 0.3 MG/DL (ref 0–1.2)
BUN SERPL-MCNC: 21 MG/DL (ref 8–27)
BUN/CREAT SERPL: 34 (ref 12–28)
CALCIUM SERPL-MCNC: 9.7 MG/DL (ref 8.7–10.3)
CHLORIDE SERPL-SCNC: 100 MMOL/L (ref 96–106)
CO2 SERPL-SCNC: 26 MMOL/L (ref 18–29)
CREAT SERPL-MCNC: 0.62 MG/DL (ref 0.57–1)
ERYTHROCYTE [DISTWIDTH] IN BLOOD BY AUTOMATED COUNT: 13.1 % (ref 12.3–15.4)
GLOBULIN SER CALC-MCNC: 3 G/DL (ref 1.5–4.5)
GLUCOSE SERPL-MCNC: 95 MG/DL (ref 65–99)
HCT VFR BLD AUTO: 41.6 % (ref 34–46.6)
HGB BLD-MCNC: 13.6 G/DL (ref 11.1–15.9)
MCH RBC QN AUTO: 30.8 PG (ref 26.6–33)
MCHC RBC AUTO-ENTMCNC: 32.7 G/DL (ref 31.5–35.7)
MCV RBC AUTO: 94 FL (ref 79–97)
PLATELET # BLD AUTO: 354 X10E3/UL (ref 150–379)
POTASSIUM SERPL-SCNC: 4 MMOL/L (ref 3.5–5.2)
PROT SERPL-MCNC: 7.2 G/DL (ref 6–8.5)
RBC # BLD AUTO: 4.41 X10E6/UL (ref 3.77–5.28)
SODIUM SERPL-SCNC: 141 MMOL/L (ref 134–144)
WBC # BLD AUTO: 5.5 X10E3/UL (ref 3.4–10.8)

## 2017-05-08 NOTE — PROGRESS NOTES
Labs show mild dehydration patient should increased water intake,  normal CBC. Also please have patient come in for nurse visit for EKG at her convenience her pulse was slightly low on two visits.

## 2017-05-19 NOTE — PROGRESS NOTES
Spoke with patient and advised of labs. No questions at this time. She has an appointment scheduled on 5/25/17. EKG at that time.

## 2017-05-23 PROBLEM — H40.1111 PRIMARY OPEN ANGLE GLAUCOMA OF RIGHT EYE, MILD STAGE: Status: ACTIVE | Noted: 2017-05-23

## 2017-05-23 PROBLEM — H25.811 COMBINED FORMS OF AGE-RELATED CATARACT OF RIGHT EYE: Status: ACTIVE | Noted: 2017-05-23

## 2017-05-25 ENCOUNTER — OFFICE VISIT (OUTPATIENT)
Dept: INTERNAL MEDICINE CLINIC | Age: 67
End: 2017-05-25

## 2017-05-25 VITALS
BODY MASS INDEX: 28.24 KG/M2 | RESPIRATION RATE: 18 BRPM | DIASTOLIC BLOOD PRESSURE: 83 MMHG | HEART RATE: 56 BPM | WEIGHT: 165.4 LBS | TEMPERATURE: 97.8 F | OXYGEN SATURATION: 99 % | SYSTOLIC BLOOD PRESSURE: 136 MMHG | HEIGHT: 64 IN

## 2017-05-25 DIAGNOSIS — F51.01 PRIMARY INSOMNIA: ICD-10-CM

## 2017-05-25 DIAGNOSIS — H40.1130 PRIMARY OPEN ANGLE GLAUCOMA OF BOTH EYES, UNSPECIFIED GLAUCOMA STAGE: ICD-10-CM

## 2017-05-25 DIAGNOSIS — Z01.818 PRE-OP EVALUATION: Primary | ICD-10-CM

## 2017-05-25 RX ORDER — MOXIFLOXACIN HCL 0.5 %
1 DROPS OPHTHALMIC (EYE) 4 TIMES DAILY
COMMUNITY
Start: 2017-05-28 | End: 2017-10-30

## 2017-05-25 RX ORDER — AMITRIPTYLINE HYDROCHLORIDE 25 MG/1
25 TABLET, FILM COATED ORAL
Qty: 30 TAB | Refills: 1 | Status: SHIPPED | OUTPATIENT
Start: 2017-05-25 | End: 2017-10-27 | Stop reason: ALTCHOICE

## 2017-05-25 RX ORDER — PREDNISOLONE ACETATE 10 MG/ML
SUSPENSION/ DROPS OPHTHALMIC
COMMUNITY
Start: 2017-05-16 | End: 2017-05-26

## 2017-05-25 NOTE — PROGRESS NOTES
Chief Complaint   Patient presents with    Pre-op Exam     Cat surgery May 31 and June 7     1. Have you been to the ER, urgent care clinic since your last visit? Hospitalized since your last visit? No    2. Have you seen or consulted any other health care providers outside of the [address] [place] [address] since your last visit? Include any pap smears or colon screening.  No

## 2017-05-25 NOTE — PROGRESS NOTES
CC: Pre-op Exam (glaucoma  surgery May 31 and June 7)      HPI:    She is a 73 y.o. female who presents for evaluation of preop evaluation for glaucoma surgery. Patient will have bilateral glaucoma surgery. She las seen on may 5th for dizziness nausea feeling unwell. Patient feels significantly better taking omeprazole taking Zyrtec for allergies. In respect to her preop evaluation. Patient has no cardiac history. She had a negative stress test as well as nuclear imaging done in 2015 which I personally reviewed results today again. Patient has a history of asymptomatic bradycardia. Her heart rate is consistently in the 50s. Denies chest pain dizziness shortness of breath. She is able to go up 2 flights of stairs without dyspnea. Bilateral glaucoma surgery : on eyedrops -patient will have surgery on May 31    ROS:   Patient complains of insomnia otherwise thorough review of systems is negative. Occasionally has to take Ambien. Would like to try a different medication to use as needed. Has difficulty falling asleep        Past Medical History:   Diagnosis Date    Teague esophagus     small segment in 2014 Dr Webb Days Bradycardia     had negative stress test and nuclear imaging done in 2015    GERD (gastroesophageal reflux disease)     Glaucoma     H/O seasonal allergies     High cholesterol     Hypertension     Osteopenia     Osteopenia     Sleep apnea     Vertigo        Current Outpatient Prescriptions on File Prior to Visit   Medication Sig Dispense Refill    clobetasol (TEMOVATE) 0.05 % ointment       desonide (TRIDESILON) 0.05 % cream       timolol (TIMOPTIC-XE) 0.5 % ophthalmic gel-forming       omeprazole (PRILOSEC) 20 mg capsule Take 2 Caps by mouth daily. 60 Cap 2    valsartan-hydroCHLOROthiazide (DIOVAN-HCT) 320-25 mg per tablet Take 1 Tab by mouth daily.  traMADol (ULTRAM) 50 mg tablet Take 1 Tab by mouth every twelve (12) hours as needed for Pain.  Max Daily Amount: 100 mg. 30 Tab 1    rosuvastatin (CRESTOR) 10 mg tablet Take 10 mg by mouth nightly.  timolol (TIMOPTIC) 0.5 % ophthalmic solution Administer 1 Drop to both eyes two (2) times a day.  aspirin 81 mg chewable tablet Take 81 mg by mouth daily.  omega-3 fatty acids-vitamin e (FISH OIL) 1,000 mg cap Take 3 Caps by mouth daily.  calcium carbonate (OS-VJ) 500 mg calcium (1,250 mg) tablet Take 1 Tab by mouth daily.  albuterol (PROVENTIL HFA, VENTOLIN HFA, PROAIR HFA) 90 mcg/actuation inhaler Take 1 Puff by inhalation every four (4) hours as needed for Wheezing. No current facility-administered medications on file prior to visit. Past Surgical History:   Procedure Laterality Date    HX HYSTERECTOMY         Family History   Problem Relation Age of Onset    Colon Cancer Mother 76    Coronary Artery Disease Father 61     Reviewed and no changes     Social History     Social History    Marital status:      Spouse name: N/A    Number of children: N/A    Years of education: N/A     Occupational History    Not on file.      Social History Main Topics    Smoking status: Never Smoker    Smokeless tobacco: Not on file    Alcohol use Yes    Drug use: No    Sexual activity: Not on file     Other Topics Concern    Not on file     Social History Narrative            Visit Vitals    /83 (BP 1 Location: Right arm, BP Patient Position: Sitting)    Pulse (!) 56    Temp 97.8 °F (36.6 °C) (Oral)    Resp 18    Ht 5' 4\" (1.626 m)    Wt 165 lb 6.4 oz (75 kg)    SpO2 99%    BMI 28.39 kg/m2       Physical Examination:   General - Well appearing female  HEENT - PERRL, TM no erythema/opacification, normal nasal turbinates, oropharynx no erythema or exudate, MMM  Neck - supple, no bruits, no TMG, no LAD  Pulm - clear to auscultation bilaterally  Cardio - RRR, normal S1 S2, no murmur gallops or rubs  Abd - soft, nontender, no masses, no HSM  Extrem - no edema, +2 distal pulses  Psych - normal affect, appropriate mood    Lab Results   Component Value Date/Time    WBC 5.5 05/05/2017 09:20 AM    HGB 13.6 05/05/2017 09:20 AM    HCT 41.6 05/05/2017 09:20 AM    PLATELET 857 06/13/1210 09:20 AM    MCV 94 05/05/2017 09:20 AM     Lab Results   Component Value Date/Time    Sodium 141 05/05/2017 09:20 AM    Potassium 4.0 05/05/2017 09:20 AM    Chloride 100 05/05/2017 09:20 AM    CO2 26 05/05/2017 09:20 AM    Anion gap 8 10/26/2015 06:31 PM    Glucose 95 05/05/2017 09:20 AM    BUN 21 05/05/2017 09:20 AM    Creatinine 0.62 05/05/2017 09:20 AM    BUN/Creatinine ratio 34 05/05/2017 09:20 AM    GFR est  05/05/2017 09:20 AM    GFR est non-AA 95 05/05/2017 09:20 AM    Calcium 9.7 05/05/2017 09:20 AM     Lab Results   Component Value Date/Time    Cholesterol, total 162 03/23/2017 11:06 AM    HDL Cholesterol 52 03/23/2017 11:06 AM    LDL, calculated 78 03/23/2017 11:06 AM    VLDL, calculated 32 03/23/2017 11:06 AM    Triglyceride 162 03/23/2017 11:06 AM     Lab Results   Component Value Date/Time    TSH 1.340 03/23/2017 11:06 AM     No results found for: PSA, PSA2, PSAR1, Brooks Fee, [id number], [id number], PSALT  Lab Results   Component Value Date/Time    Hemoglobin A1c 6.1 03/23/2017 11:06 AM     Lab Results   Component Value Date/Time    VITAMIN D, 25-HYDROXY 61.2 03/23/2017 11:06 AM       Lab Results   Component Value Date/Time    ALT (SGPT) 22 05/05/2017 09:20 AM    AST (SGOT) 25 05/05/2017 09:20 AM    Alk. phosphatase 44 05/05/2017 09:20 AM    Bilirubin, direct 0.11 03/23/2017 11:06 AM    Bilirubin, total 0.3 05/05/2017 09:20 AM     EKG done today as requested as part of her preop clearance. The rate is 48. UT interval QRS interval QT interval are normal.  She has nonspecific ST changes in the anterior lateral leads.   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 Pre-op evaluation  -No cardiac history pain. Patient has asymptomatic bradycardia. EKG from today is unchanged from EKG in 2015. Patient did have a stress test with nuclear imaging in 2015 which was negative. Patient is medically optimized for low risk surgery and may proceed with glaucoma surgery  - AMB POC EKG ROUTINE W/ 12 LEADS, SCREEN ()  -EKG done as requested by preop form and form faxed/along with EKG  2. Primary open angle glaucoma of both eyes, unspecified glaucoma stage  -Currently on multiple eyedrops with plan to have surgery in both eyes    In addition to the preop we also discussed insomnia  3. Primary insomnia  -Discussed with patient that we will stop Ambien  Advised to try low-dose Elavil at bedtime. Discussed that if taking tramadol cannot take Elavil as together they can increase the risk of serotonin syndrome. Patient rarely takes tramadol and acknowledged understanding of these instructions  - amitriptyline (ELAVIL) 25 mg tablet; Take 1 Tab by mouth nightly. Dispense: 30 Tab; Refill: 1      Follow-up Disposition:  Return in about 5 months (around 10/25/2017) for follow up on HTN, GERD, XOL.     Doc Garrett MD

## 2017-05-25 NOTE — MR AVS SNAPSHOT
Visit Information Date & Time Provider Department Dept. Phone Encounter #  
 5/25/2017 11:30 AM Abril, Nickolas [address] [place] [address],[address] [phone] [id number] Follow-up Instructions Return in about 5 months (around 10/25/2017) for follow up on HTN, GERD, XOL. Upcoming Health Maintenance Date Due DTaP/Tdap/Td series (1 - Tdap) 7/31/1972 ZOSTER VACCINE AGE 60> 7/31/2011 Pneumococcal 65+ Low/Medium Risk (1 of 2 - PCV13) 7/31/2016 MEDICARE YEARLY EXAM 7/31/2016 INFLUENZA AGE 9 TO ADULT 8/1/2017 GLAUCOMA SCREENING Q2Y 3/1/2019 BREAST CANCER SCRN MAMMOGRAM 4/14/2019 COLONOSCOPY 7/10/2025 Allergies as of 5/25/2017  Review Complete On: 5/25/2017 By: Tulio Urrutia Severity Noted Reaction Type Reactions Codeine  10/26/2015    Nausea and Vomiting Thiuram Analogues  03/23/2017    Rash Current Immunizations  Reviewed on 10/27/2015 Name Date Influenza Vaccine (Quad) PF 10/27/2015  5:27 PM  
  
 Not reviewed this visit You Were Diagnosed With   
  
 Codes Comments Pre-op evaluation    -  Primary ICD-10-CM: Y12.170 ICD-9-CM: V72.84 Primary open angle glaucoma of both eyes, unspecified glaucoma stage     ICD-10-CM: H40.1130 ICD-9-CM: 365.11, 365.70 Primary insomnia     ICD-10-CM: F51.01 
ICD-9-CM: 307.42 Vitals BP Pulse Temp Resp Height(growth percentile) Weight(growth percentile) 136/83 (BP 1 Location: Right arm, BP Patient Position: Sitting) (!) 56 97.8 °F (36.6 °C) (Oral) 18 5' 4\" (1.626 m) 165 lb 6.4 oz (75 kg) SpO2 BMI OB Status Smoking Status 99% 28.39 kg/m2 Hysterectomy Never Smoker BMI and BSA Data Body Mass Index Body Surface Area  
 28.39 kg/m 2 1.84 m 2 Preferred Pharmacy Pharmacy Name Phone Devin Dobbs, TANA/ [facility]. [phone] Your Updated Medication List  
  
   
 This list is accurate as of: 5/25/17 12:15 PM.  Always use your most recent med list.  
  
  
  
  
 albuterol 90 mcg/actuation inhaler Commonly known as:  PROVENTIL HFA, VENTOLIN HFA, PROAIR HFA Take 1 Puff by inhalation every four (4) hours as needed for Wheezing. amitriptyline 25 mg tablet Commonly known as:  ELAVIL Take 1 Tab by mouth nightly. aspirin 81 mg chewable tablet Take 81 mg by mouth daily. calcium carbonate 500 mg calcium (1,250 mg) tablet Commonly known as:  OS-VJ Take 1 Tab by mouth daily. clobetasol 0.05 % ointment Commonly known as:  TEMOVATE  
  
 CRESTOR 10 mg tablet Generic drug:  rosuvastatin Take 10 mg by mouth nightly. desonide 0.05 % cream  
Commonly known as:  Jaqueline Shape FISH OIL 1,000 mg Cap Generic drug:  omega-3 fatty acids-vitamin e Take 3 Caps by mouth daily. omeprazole 20 mg capsule Commonly known as:  PRILOSEC Take 2 Caps by mouth daily. prednisoLONE acetate 1 % ophthalmic suspension Commonly known as:  PRED FORTE * timolol 0.5 % ophthalmic solution Commonly known as:  TIMOPTIC Administer 1 Drop to both eyes two (2) times a day. * timolol 0.5 % ophthalmic gel-forming Commonly known as:  TIMOPTIC-XE  
  
 traMADol 50 mg tablet Commonly known as:  ULTRAM  
Take 1 Tab by mouth every twelve (12) hours as needed for Pain. Max Daily Amount: 100 mg.  
  
 valsartan-hydroCHLOROthiazide 320-25 mg per tablet Commonly known as:  DIOVAN-HCT Take 1 Tab by mouth daily. VIGAMOX 0.5 % ophthalmic solution Generic drug:  moxifloxacin * Notice: This list has 2 medication(s) that are the same as other medications prescribed for you. Read the directions carefully, and ask your doctor or other care provider to review them with you. Prescriptions Sent to Pharmacy Refills  
 amitriptyline (ELAVIL) 25 mg tablet 1 Sig: Take 1 Tab by mouth nightly.   
 Class: Normal  
 Pharmacy: [id number] [facility] [address]  #: [phone] Route: Oral  
  
We Performed the Following AMB POC EKG ROUTINE W/ 12 LEADS, SCREEN () [ [facility]] Follow-up Instructions Return in about 5 months (around 10/25/2017) for follow up on HTN, GERD, XOL. Patient Instructions Insomnia: Care Instructions Your Care Instructions Insomnia is the inability to sleep well. It is a common problem for most people at some time. Insomnia may make it hard for you to get to sleep, stay asleep, or sleep as long as you need to. This can make you tired and grouchy during the day. It can also make you forgetful, less effective at work, and unhappy. Insomnia can be caused by conditions such as depression or anxiety. Pain can also affect your ability to sleep. When these problems are solved, the insomnia usually clears up. But sometimes bad sleep habits can cause insomnia. If insomnia is affecting your work or your enjoyment of life, you can take steps to improve your sleep. Follow-up care is a key part of your treatment and safety. Be sure to make and go to all appointments, and call your doctor if you are having problems. It's also a good idea to know your test results and keep a list of the medicines you take. How can you care for yourself at home? What to avoid · Do not have drinks with caffeine, such as coffee or black tea, for 8 hours before bed. · Do not smoke or use other types of tobacco near bedtime. Nicotine is a stimulant and can keep you awake. · Avoid drinking alcohol late in the evening, because it can cause you to wake in the middle of the night. · Do not eat a big meal close to bedtime. If you are hungry, eat a light snack. · Do not drink a lot of water close to bedtime, because the need to urinate may wake you up during the night. · Do not read or watch TV in bed. Use the bed only for sleeping and sexual activity. What to try · Go to bed at the same time every night, and wake up at the same time every morning. Do not take naps during the day. · Keep your bedroom quiet, dark, and cool. · Sleep on a comfortable pillow and mattress. · If watching the clock makes you anxious, turn it facing away from you so you cannot see the time. · If you worry when you lie down, start a worry book. Well before bedtime, write down your worries, and then set the book and your concerns aside. · Try meditation or other relaxation techniques before you go to bed. · If you cannot fall asleep, get up and go to another room until you feel sleepy. Do something relaxing. Repeat your bedtime routine before you go to bed again. · Make your house quiet and calm about an hour before bedtime. Turn down the lights, turn off the TV, log off the computer, and turn down the volume on music. This can help you relax after a busy day. When should you call for help? Watch closely for changes in your health, and be sure to contact your doctor if: 
· Your efforts to improve your sleep do not work. · Your insomnia gets worse. · You have been feeling down, depressed, or hopeless or have lost interest in things that you usually enjoy. Where can you learn more? Go to http://dilshad-ely.info/. Enter P513 in the search box to learn more about \"Insomnia: Care Instructions. \" Current as of: July 26, 2016 Content Version: 11.2 © 9600-1058 [employer]. Care instructions adapted under license by [employer] (which disclaims liability or warranty for this information). If you have questions about a medical condition or this instruction, always ask your healthcare professional. [place] [address] any warranty or liability for your use of this information. 
 
---------------trial of elavil for sleep. Do not take if taken tramadol drug interaction Introducing \[facility] [place] [facility]\"" & HEALTH SERVICES! Dear Deb Jobs: Thank you for requesting a [employer] account. Our records indicate that you already have an active [employer] account. You can access your account anytime at [email or web address] Did you know that you can access your hospital and ER discharge instructions at any time in [employer]? You can also review all of your test results from your hospital stay or ER visit. Additional Information If you have questions, please visit the Frequently Asked Questions section of the [employer] website at [email or web address] Remember, [employer] is NOT to be used for urgent needs. For medical emergencies, dial 911. Now available from your iPhone and Android! Please provide this summary of care documentation to your next provider. Your primary care clinician is listed as LANI Robles. If you have any questions after today's visit, please call [phone].

## 2017-05-25 NOTE — PATIENT INSTRUCTIONS

## 2017-05-26 RX ORDER — ZOLPIDEM TARTRATE 5 MG/1
5 TABLET ORAL
COMMUNITY
End: 2017-10-27 | Stop reason: ALTCHOICE

## 2017-05-26 NOTE — PERIOP NOTES
[facility]  Ambulatory Surgery Unit  Pre-operative Instructions    Surgery/Procedure Date  5/31/17            Tentative Arrival Time TBD      1. On the day of your surgery/procedure, please report to the Ambulatory Surgery Unit Registration Desk and sign in at your designated time. The Ambulatory Surgery Unit is located in [facility] on the [facility] side of the \[facility] [place] [facility]\"" across from the [address] [place] [address]. Please have all of your health insurance cards and a photo ID. 2. You must have someone with you to drive you home, as you should not drive a car for 24 hours following anesthesia. Please make arrangements for a responsible adult friend or family member to stay with you for at least the first 24 hours after your surgery. 3. Do not have anything to eat or drink (including water, gum, mints, coffee, juice) after midnight 5/30. This may not apply to medications prescribed by your physician. (Please note below the special instructions with medications to take the morning of surgery, if applicable.)    4. We recommend you do not drink any alcoholic beverages for 24 hours before and after your surgery. 5. Stop all Aspirin, non-steroidal anti-inflammatory drugs (i.e. Advil, Aleve), vitamins, and supplements as directed by your surgeon's office. **If you are currently taking Plavix, Coumadin, or other blood-thinning agents, contact your surgeon for instructions. **    6. In an effort to help prevent surgical site infection, we ask that you shower with an anti-bacterial soap (i.e. Dial or Safeguard) on the morning of surgery. Do not apply any lotions, powders, or deodorants after the shower on the day of your procedure. 7. Wear comfortable clothes. Wear glasses instead of contacts. Do not bring any jewelry or money (other than copays or fees as instructed). Do not wear make-up, particularly mascara, the morning of your surgery.  Do not wear nail polish, particularly if you are having foot /hand surgery. Wear your hair loose or down, no ponytails, buns, pratik pins or clips. All body piercings must be removed. 8. You should understand that if you do not follow these instructions your surgery may be cancelled. If your physical condition changes (i.e. fever, cold or flu) please contact your surgeon as soon as possible. 9. It is important that you be on time. If a situation occurs where you may be late, or if you have any questions or problems, please call [phone].    10. Your surgery time may be subject to change. You will receive a phone call the day prior to surgery to confirm your arrival time. Special Instructions: Take all medications and inhalers, as prescribed, on the morning of surgery with a sip of water EXCEPT: none      I understand a pre-operative phone call will be made to verify my surgery time. In the event that I am not available, I give permission for a message to be left on my answering service and/or with another person?     yes    Reviewed by phone with pt, verbalized understanding.     ___________________      ___________________      ________________  (Signature of Patient)          (Witness)                   (Date and Time)

## 2017-05-30 ENCOUNTER — ANESTHESIA EVENT (OUTPATIENT)
Dept: SURGERY | Age: 67
End: 2017-05-30
Payer: MEDICARE

## 2017-05-31 ENCOUNTER — HOSPITAL ENCOUNTER (OUTPATIENT)
Age: 67
Setting detail: OUTPATIENT SURGERY
Discharge: HOME | End: 2017-05-31
Attending: OPHTHALMOLOGY | Admitting: OPHTHALMOLOGY
Payer: MEDICARE

## 2017-05-31 ENCOUNTER — ANESTHESIA (OUTPATIENT)
Dept: SURGERY | Age: 67
End: 2017-05-31
Payer: MEDICARE

## 2017-05-31 VITALS
OXYGEN SATURATION: 96 % | DIASTOLIC BLOOD PRESSURE: 70 MMHG | BODY MASS INDEX: 28.19 KG/M2 | HEIGHT: 64 IN | SYSTOLIC BLOOD PRESSURE: 122 MMHG | HEART RATE: 49 BPM | WEIGHT: 165.13 LBS | TEMPERATURE: 97.9 F | RESPIRATION RATE: 13 BRPM

## 2017-05-31 PROCEDURE — 74011250636 HC RX REV CODE- 250/636: Performed by: OPHTHALMOLOGY

## 2017-05-31 PROCEDURE — 74011000250 HC RX REV CODE- 250: Performed by: OPHTHALMOLOGY

## 2017-05-31 PROCEDURE — 76060000061 HC AMB SURG ANES 0.5 TO 1 HR: Performed by: OPHTHALMOLOGY

## 2017-05-31 PROCEDURE — 76210000046 HC AMBSU PH II REC FIRST 0.5 HR: Performed by: OPHTHALMOLOGY

## 2017-05-31 PROCEDURE — V2632 POST CHMBR INTRAOCULAR LENS: HCPCS | Performed by: OPHTHALMOLOGY

## 2017-05-31 PROCEDURE — 74011250636 HC RX REV CODE- 250/636

## 2017-05-31 PROCEDURE — 77030018846 HC SOL IRR STRL H20 ICUM -A: Performed by: OPHTHALMOLOGY

## 2017-05-31 PROCEDURE — 77030021352 HC CBL LD SYS DISP COVD -B: Performed by: OPHTHALMOLOGY

## 2017-05-31 PROCEDURE — C1783 OCULAR IMP, AQUEOUS DRAIN DE: HCPCS | Performed by: OPHTHALMOLOGY

## 2017-05-31 PROCEDURE — 76030000000 HC AMB SURG OR TIME 0.5 TO 1: Performed by: OPHTHALMOLOGY

## 2017-05-31 DEVICE — LENS IOL POST 1-PC 6X13 16.5 -- ACRYSOF: Type: IMPLANTABLE DEVICE | Site: EYE | Status: FUNCTIONAL

## 2017-05-31 DEVICE — TRABECULAR MICRO-BYPASS STENT SYSTEM - LEFT
Type: IMPLANTABLE DEVICE | Site: EYE | Status: FUNCTIONAL
Brand: ISTENT

## 2017-05-31 RX ORDER — FLUTICASONE PROPIONATE 50 MCG
2 SPRAY, SUSPENSION (ML) NASAL
COMMUNITY
End: 2019-04-19 | Stop reason: SDUPTHER

## 2017-05-31 RX ORDER — ONDANSETRON 2 MG/ML
4 INJECTION INTRAMUSCULAR; INTRAVENOUS AS NEEDED
Status: DISCONTINUED | OUTPATIENT
Start: 2017-05-31 | End: 2017-05-31 | Stop reason: HOSPADM

## 2017-05-31 RX ORDER — SODIUM CHLORIDE 9 MG/ML
25 INJECTION, SOLUTION INTRAVENOUS CONTINUOUS
Status: DISCONTINUED | OUTPATIENT
Start: 2017-05-31 | End: 2017-05-31 | Stop reason: HOSPADM

## 2017-05-31 RX ORDER — MIDAZOLAM HYDROCHLORIDE 1 MG/ML
INJECTION, SOLUTION INTRAMUSCULAR; INTRAVENOUS AS NEEDED
Status: DISCONTINUED | OUTPATIENT
Start: 2017-05-31 | End: 2017-05-31 | Stop reason: HOSPADM

## 2017-05-31 RX ORDER — PHENYLEPHRINE HYDROCHLORIDE 25 MG/ML
1 SOLUTION/ DROPS OPHTHALMIC
Status: COMPLETED | OUTPATIENT
Start: 2017-05-31 | End: 2017-05-31

## 2017-05-31 RX ORDER — PREDNISOLONE ACETATE 10 MG/ML
1 SUSPENSION/ DROPS OPHTHALMIC 2 TIMES DAILY
Status: DISCONTINUED | OUTPATIENT
Start: 2017-05-31 | End: 2017-05-31 | Stop reason: HOSPADM

## 2017-05-31 RX ORDER — CYCLOPENTOLATE HYDROCHLORIDE 20 MG/ML
1 SOLUTION/ DROPS OPHTHALMIC
Status: COMPLETED | OUTPATIENT
Start: 2017-05-31 | End: 2017-05-31

## 2017-05-31 RX ORDER — LIDOCAINE HYDROCHLORIDE 10 MG/ML
0.1 INJECTION, SOLUTION EPIDURAL; INFILTRATION; INTRACAUDAL; PERINEURAL AS NEEDED
Status: DISCONTINUED | OUTPATIENT
Start: 2017-05-31 | End: 2017-05-31 | Stop reason: HOSPADM

## 2017-05-31 RX ORDER — FENTANYL CITRATE 50 UG/ML
INJECTION, SOLUTION INTRAMUSCULAR; INTRAVENOUS AS NEEDED
Status: DISCONTINUED | OUTPATIENT
Start: 2017-05-31 | End: 2017-05-31 | Stop reason: HOSPADM

## 2017-05-31 RX ORDER — SODIUM CHLORIDE 0.9 % (FLUSH) 0.9 %
5-10 SYRINGE (ML) INJECTION AS NEEDED
Status: DISCONTINUED | OUTPATIENT
Start: 2017-05-31 | End: 2017-05-31 | Stop reason: HOSPADM

## 2017-05-31 RX ORDER — LIDOCAINE HYDROCHLORIDE 40 MG/ML
3 INJECTION, SOLUTION RETROBULBAR; TOPICAL ONCE
Status: COMPLETED | OUTPATIENT
Start: 2017-05-31 | End: 2017-05-31

## 2017-05-31 RX ORDER — SODIUM CHLORIDE 0.9 % (FLUSH) 0.9 %
5-10 SYRINGE (ML) INJECTION EVERY 8 HOURS
Status: DISCONTINUED | OUTPATIENT
Start: 2017-05-31 | End: 2017-05-31 | Stop reason: HOSPADM

## 2017-05-31 RX ORDER — SODIUM CHLORIDE, SODIUM LACTATE, POTASSIUM CHLORIDE, CALCIUM CHLORIDE 600; 310; 30; 20 MG/100ML; MG/100ML; MG/100ML; MG/100ML
25 INJECTION, SOLUTION INTRAVENOUS CONTINUOUS
Status: DISCONTINUED | OUTPATIENT
Start: 2017-05-31 | End: 2017-05-31 | Stop reason: HOSPADM

## 2017-05-31 RX ORDER — GUAIFENESIN 600 MG/1
600 TABLET, EXTENDED RELEASE ORAL
COMMUNITY
End: 2019-04-19 | Stop reason: SDUPTHER

## 2017-05-31 RX ORDER — DIPHENHYDRAMINE HYDROCHLORIDE 50 MG/ML
12.5 INJECTION, SOLUTION INTRAMUSCULAR; INTRAVENOUS AS NEEDED
Status: DISCONTINUED | OUTPATIENT
Start: 2017-05-31 | End: 2017-05-31 | Stop reason: HOSPADM

## 2017-05-31 RX ORDER — FENTANYL CITRATE 50 UG/ML
25 INJECTION, SOLUTION INTRAMUSCULAR; INTRAVENOUS
Status: DISCONTINUED | OUTPATIENT
Start: 2017-05-31 | End: 2017-05-31 | Stop reason: HOSPADM

## 2017-05-31 RX ORDER — GENTAMICIN SULFATE 0.3 %
0.5 OINTMENT (GRAM) OPHTHALMIC (EYE) 3 TIMES DAILY
Status: DISCONTINUED | OUTPATIENT
Start: 2017-05-31 | End: 2017-05-31 | Stop reason: HOSPADM

## 2017-05-31 RX ORDER — LIDOCAINE HYDROCHLORIDE 10 MG/ML
0.5 INJECTION, SOLUTION EPIDURAL; INFILTRATION; INTRACAUDAL; PERINEURAL ONCE
Status: COMPLETED | OUTPATIENT
Start: 2017-05-31 | End: 2017-05-31

## 2017-05-31 RX ORDER — PROPARACAINE HYDROCHLORIDE 5 MG/ML
1 SOLUTION/ DROPS OPHTHALMIC
Status: COMPLETED | OUTPATIENT
Start: 2017-05-31 | End: 2017-05-31

## 2017-05-31 RX ORDER — DICLOFENAC SODIUM 1 MG/ML
1 SOLUTION/ DROPS OPHTHALMIC
Status: COMPLETED | OUTPATIENT
Start: 2017-05-31 | End: 2017-05-31

## 2017-05-31 RX ORDER — ONDANSETRON 2 MG/ML
INJECTION INTRAMUSCULAR; INTRAVENOUS AS NEEDED
Status: DISCONTINUED | OUTPATIENT
Start: 2017-05-31 | End: 2017-05-31 | Stop reason: HOSPADM

## 2017-05-31 RX ADMIN — SODIUM CHLORIDE 25 ML/HR: 900 INJECTION, SOLUTION INTRAVENOUS at 08:10

## 2017-05-31 RX ADMIN — CYCLOPENTOLATE HYDROCHLORIDE 1 DROP: 20 SOLUTION/ DROPS OPHTHALMIC at 07:39

## 2017-05-31 RX ADMIN — DICLOFENAC SODIUM 1 DROP: 1 SOLUTION/ DROPS OPHTHALMIC at 07:53

## 2017-05-31 RX ADMIN — PROPARACAINE HYDROCHLORIDE 1 DROP: 5 SOLUTION/ DROPS OPHTHALMIC at 07:53

## 2017-05-31 RX ADMIN — FENTANYL CITRATE 50 MCG: 50 INJECTION, SOLUTION INTRAMUSCULAR; INTRAVENOUS at 09:04

## 2017-05-31 RX ADMIN — CYCLOPENTOLATE HYDROCHLORIDE 1 DROP: 20 SOLUTION/ DROPS OPHTHALMIC at 07:53

## 2017-05-31 RX ADMIN — DICLOFENAC SODIUM 1 DROP: 1 SOLUTION/ DROPS OPHTHALMIC at 07:39

## 2017-05-31 RX ADMIN — DICLOFENAC SODIUM 1 DROP: 1 SOLUTION/ DROPS OPHTHALMIC at 08:00

## 2017-05-31 RX ADMIN — CYCLOPENTOLATE HYDROCHLORIDE 1 DROP: 20 SOLUTION/ DROPS OPHTHALMIC at 07:47

## 2017-05-31 RX ADMIN — PROPARACAINE HYDROCHLORIDE 1 DROP: 5 SOLUTION/ DROPS OPHTHALMIC at 08:09

## 2017-05-31 RX ADMIN — PHENYLEPHRINE HYDROCHLORIDE 1 DROP: 25 SOLUTION/ DROPS OPHTHALMIC at 07:47

## 2017-05-31 RX ADMIN — DICLOFENAC SODIUM 1 DROP: 1 SOLUTION/ DROPS OPHTHALMIC at 07:47

## 2017-05-31 RX ADMIN — MIDAZOLAM HYDROCHLORIDE 1 MG: 1 INJECTION, SOLUTION INTRAMUSCULAR; INTRAVENOUS at 08:38

## 2017-05-31 RX ADMIN — CYCLOPENTOLATE HYDROCHLORIDE 1 DROP: 20 SOLUTION/ DROPS OPHTHALMIC at 08:00

## 2017-05-31 RX ADMIN — PHENYLEPHRINE HYDROCHLORIDE 1 DROP: 25 SOLUTION/ DROPS OPHTHALMIC at 07:53

## 2017-05-31 RX ADMIN — ONDANSETRON 4 MG: 2 INJECTION INTRAMUSCULAR; INTRAVENOUS at 09:04

## 2017-05-31 RX ADMIN — PROPARACAINE HYDROCHLORIDE 1 DROP: 5 SOLUTION/ DROPS OPHTHALMIC at 08:00

## 2017-05-31 RX ADMIN — PROPARACAINE HYDROCHLORIDE 1 DROP: 5 SOLUTION/ DROPS OPHTHALMIC at 07:40

## 2017-05-31 RX ADMIN — PHENYLEPHRINE HYDROCHLORIDE 1 DROP: 25 SOLUTION/ DROPS OPHTHALMIC at 07:39

## 2017-05-31 RX ADMIN — PHENYLEPHRINE HYDROCHLORIDE 1 DROP: 25 SOLUTION/ DROPS OPHTHALMIC at 08:00

## 2017-05-31 RX ADMIN — PROPARACAINE HYDROCHLORIDE 1 DROP: 5 SOLUTION/ DROPS OPHTHALMIC at 07:47

## 2017-05-31 NOTE — OP NOTES
PREOPERATIVE DIAGNOSES:   1. Primary open angle glaucoma, uncontrolled, right  2. Visually impairing combined cataract, right    POSTOPERATIVE DIAGNOSES:   1. Primary open angle glaucoma, uncontrolled, right  2. Visually impairing combined cataract, right    OPERATION: I Stent insertion with Kelman phacoemulsification and intraocular lens implant. Procedure(s):   I STENT / PHACO WITH IOL IMPLANT right   ANESTHESIA: Topical with intravenous sedation    TYPE OF LENS IMPLANT USED:   Implant Name Type Inv. Item Serial No.  Lot No. LRB No. Used Action   LENS IOL POST 1-PC 6X13 16.5 -- [employer] - [id number]  LENS IOL POST 1-PC 6X13 16.5 -- [employer] [id number] [employer]  Right 1 Implanted   STENT EYE TRABECLAR BE INVAS -- ISTENT - [id number]   STENT EYE TRABECLAR BE INVAS -- ISTENT [id number] [employer] [id number] Right 1 Implanted       PHACO TIME: 28.3 seconds at an average power of 24.6%. Estimated blood loss: None   Complications: None   Specimen removed: None     DESCRIPTION OF PROCEDURE: The patient was brought to the holding area where an IV was begun at a keep open rate. The patient received several instillations of Cyclogyl 2% and Mathew-Synephrine 2.5% and proparacaine 0.5% until best pupillary dilatation was achieved. The patient was connected to cardiovascular monitoring. The patient was then brought back to the operating suite and then prepped and draped in the usual manner for ocular surgery. Cardiovascular monitoring was reestablished. The patient received several instillations of Betadine in the inferior conjunctival cul-de-sac along with 4% Xylocaine. The operating microscope was brought into position and the lid speculum was set into position. Two drops of 4% xylocaine were applied to the patient's cornea. A pair of LRI were placed straddling axis 94 degrees at 60% blade depth to minimize astigmatism.   A paracentesis was then created and 1% preservative-free Xylocaine was instilled into the anterior chamber and viscoelastic was instilled into the anterior chamber. The 2.4mm keratome was utilized to create an incision. The capsulorrhexsis was performed in a circular fashion. The hard nucleus of the lens was hydrodissected away from the capsule using BSS solution. viscoelastic was then instilled into the anterior chamber to protect the corneal endothelium. Phacoemulsification was then carried out followed by irrigation and aspiration. Following this, the sterile lens was removed from the sterile container and inserted into the lens injector. It was inserted into the eye without complications and positioned appropriately on the visual /astigmatic axis . The Viscoelastic was then removed from the posterior chamber as well as the anterior chamber of the eye, and Miochol was instilled posterior to the iris plane to facilitate pupillary miosis. Viscoelastic was then reinstilled into the anterior chamber to facilitate visualization of the angle. The microscope was angled to 45 degrees and the patient's head positioned to maximize visibility of the angle. The gonioprism was coupled to the cornea with viscoelastic. The I-stent was carefully inserted into schlemm's canal and checked to be sure it was securely positioned. Once positioning was confirmed the remaining viscoelastic was removed from the eye. The incision site was checked for any leaks. After finding none, the patient received several instillations of Iopidine, pred forte and then followed by instillation of gentamycin ophthalmic ointment. The lid speculum was removed and the patient was patched with a semi-pressure dressing and shield and was brought to the recovery room in alert and stable and satisfactory postoperative condition. Instructions were given to the patients family for their immediate postoperative care, and the patient is to follow up with me in the office tomorrow.       Jocelyn Avendano, DO  5/31/2017

## 2017-05-31 NOTE — ANESTHESIA PREPROCEDURE EVALUATION
Anesthetic History   No history of anesthetic complications            Review of Systems / Medical History  Patient summary reviewed, nursing notes reviewed and pertinent labs reviewed    Pulmonary        Sleep apnea: CPAP           Neuro/Psych   Within defined limits           Cardiovascular    Hypertension          Hyperlipidemia    Exercise tolerance: >4 METS  Comments: Hx Bradycardia      GI/Hepatic/Renal     GERD          Comments: Hx Teague's esophagus  Endo/Other             Other Findings   Comments: Hx cataracts  Vertigo   Osteopenia   glaucoma          Physical Exam    Airway  Mallampati: II    Neck ROM: normal range of motion   Mouth opening: Normal     Cardiovascular  Regular rate and rhythm,  S1 and S2 normal,  no murmur, click, rub, or gallop             Dental    Dentition: Caps/crowns     Pulmonary  Breath sounds clear to auscultation               Abdominal  GI exam deferred       Other Findings            Anesthetic Plan    ASA: 2  Anesthesia type: MAC          Induction: Intravenous  Anesthetic plan and risks discussed with: Patient

## 2017-05-31 NOTE — IP AVS SNAPSHOT
[address] [facility] 
[phone] Patient: Mason Mendez MRN: [id number] QOF:[id number] You are allergic to the following Allergen Reactions Latex Rash Codeine Nausea and Vomiting Thiuram Analogues Rash Recent Documentation Height Weight BMI OB Status Smoking Status 1.626 m 74.9 kg 28.34 kg/m2 Hysterectomy Never Smoker Emergency Contacts Name Discharge Info Relation Home Work Mobile Tomas Damon DISCHARGE CAREGIVER [3] Spouse [3] [phone] About your hospitalization You were admitted on:  May 31, 2017 You last received care in the:  [facility] [employer] You were discharged on:  May 31, 2017 Unit phone number:  [phone] Why you were hospitalized Your primary diagnosis was:  Primary Open Angle Glaucoma Of Right Eye, Mild Stage Your diagnoses also included:  Combined Forms Of Age-Related Cataract Of Right Eye  
  
  
 
  
  
Providers Seen During Your Hospitalizations Provider Role Specialty Primary office phone [address] [facility] Attending Provider Ophthalmology [phone] Your Primary Care Physician (PCP) Primary Care Physician Office Phone Office Fax Wiley Christensen [phone] Follow-up Information Follow up With Details Comments Contact Info Stalin Hampton MD   [address] [place] [address] 
[phone] Your Appointments Wednesday May 31, 2017 CATARACT EXTRACTION WITH INTRA OCULAR LENS IMPLANT with [address] [facility] SURGERY UNIT (RI OR PRE ASSESSMENT) [facility]  
[phone] Current Discharge Medication List  
  
ASK your doctor about these medications Dose & Instructions Dispensing Information Comments Morning Noon Evening Bedtime albuterol 90 mcg/actuation inhaler Commonly known as:  PROVENTIL HFA, VENTOLIN HFA, PROAIR HFA Your last dose was: Your next dose is:    
   
   
 Dose:  1 Puff Take 1 Puff by inhalation every four (4) hours as needed for Wheezing. Refills:  0 AMBIEN 5 mg tablet Generic drug:  zolpidem Your last dose was: Your next dose is:    
   
   
 Dose:  5 mg Take 5 mg by mouth nightly as needed for Sleep. Refills:  0  
     
   
   
   
  
 amitriptyline 25 mg tablet Commonly known as:  ELAVIL Your last dose was: Your next dose is:    
   
   
 Dose:  25 mg Take 1 Tab by mouth nightly. Quantity:  30 Tab Refills:  1  
     
   
   
   
  
 aspirin 81 mg chewable tablet Your last dose was: Your next dose is:    
   
   
 Dose:  81 mg Take 81 mg by mouth daily. Refills:  0  
     
   
   
   
  
 calcium carbonate 500 mg calcium (1,250 mg) tablet Commonly known as:  OS-VJ Your last dose was: Your next dose is:    
   
   
 Dose:  1 Tab Take 1 Tab by mouth daily. Refills:  0  
     
   
   
   
  
 CRESTOR 10 mg tablet Generic drug:  rosuvastatin Your last dose was: Your next dose is:    
   
   
 Dose:  10 mg Take 10 mg by mouth nightly. Refills:  0  
     
   
   
   
  
 desonide 0.05 % cream  
Commonly known as:  Kyaw Maryan Your last dose was: Your next dose is:    
   
   
 Apply  to affected area as needed. Refills:  0  
     
   
   
   
  
 FISH OIL 1,000 mg Cap Generic drug:  omega-3 fatty acids-vitamin e Your last dose was: Your next dose is:    
   
   
 Dose:  3 Cap Take 3 Caps by mouth daily. Refills:  0  
     
   
   
   
  
 FLONASE 50 mcg/actuation nasal spray Generic drug:  fluticasone Your last dose was: Your next dose is:    
   
   
 Dose:  2 Spray 2 Sprays by Both Nostrils route daily. Refills:  0 MUCINEX 600 mg ER tablet Generic drug:  guaiFENesin ER Your last dose was: Your next dose is:    
   
   
 Dose:  600 mg Take 600 mg by mouth two (2) times a day. Refills:  0  
     
   
   
   
  
 omeprazole 20 mg capsule Commonly known as:  PRILOSEC Your last dose was: Your next dose is:    
   
   
 Dose:  40 mg Take 2 Caps by mouth daily. Quantity:  60 Cap Refills:  2  
     
   
   
   
  
 timolol 0.5 % ophthalmic solution Commonly known as:  TIMOPTIC Your last dose was: Your next dose is:    
   
   
 Dose:  1 Drop Administer 1 Drop to both eyes daily. Refills:  0  
     
   
   
   
  
 traMADol 50 mg tablet Commonly known as:  ULTRAM  
   
Your last dose was: Your next dose is:    
   
   
 Dose:  50 mg Take 1 Tab by mouth every twelve (12) hours as needed for Pain. Max Daily Amount: 100 mg. Quantity:  30 Tab Refills:  1  
     
   
   
   
  
 valsartan-hydroCHLOROthiazide 320-25 mg per tablet Commonly known as:  DIOVAN-HCT Your last dose was: Your next dose is:    
   
   
 Dose:  1 Tab Take 1 Tab by mouth daily. Refills:  0  
     
   
   
   
  
 VIGAMOX 0.5 % ophthalmic solution Generic drug:  moxifloxacin Your last dose was: Your next dose is:    
   
   
 Dose:  1 Drop Administer 1 Drop to right eye four (4) times daily. Refills:  0 Discharge Instructions [facility] Glaser [facility] 
[facility]. 
[phone] Post-Operative Instructions for I Stent - Cataract Surgery ? Remove your eye shield and bandage at 12 noon the same day as surgery and start your eye drops. THROW AWAY THE GAUZE UNDER THE SHIELD. ? Place the blue eye shield back on for one week while asleep. Use the tape included in your bag. 
       
 
? DO NOT RUB YOUR EYE EVER! DO NOT WEAR EYE MAKEUP FOR 1 WEEK! ? You may take a bath today and you can shower starting tomorrow. ? You may resume your previous diet. ? If you use glaucoma drops in the operative eye, stop using them. May continue in non-operative eye as directed by Dr Magalys Gunn. ? Common symptoms after surgery include a scratchy feeling, slight headache, red eye, and/or blurred vision. You may use Advil or Tylenol for any discomfort. ? Avoid strenuous activities and driving until you see Dr. Magalys Gunn tomorrow. Please use care when walking, your depth perception may be altered. ? Bring your bag with your drops to your follow up appointment. Below are Instructions On How To Use Your Eye Drops. ON THE DAY OF SURGERY: 
 
? Use all three eye drops at 12 noon, 4pm, and 8pm.  Wait 10 minutes between drops. THE DAY AFTER SURGERY: 
 
? You will use the drops 4 times a day at 8am, 12 noon, 4pm, and 8pm. 
? Use the drops every day until bottles are empty. Vigamox (tan top) Put 1 drop in at 8am, 12 noon, 4pm, and 8pm. 
 
Pred Acetate (pink top) Put 1 drop in at 8:10am, 12:10pm, 4:10pm, and 8:10pm. Shake before using. Diclofenac Put 1 drop in at 8:20am, 12:20pm, 4:20pm, 8:20pm. 
 
CONTINUE DROPS UNTIL ALL BOTTLES ARE EMPTY! Follow-up appointment tomorrow at Dr. Steff Amaro office:____________________ Please call the office at [phone] if you experience severe pain or nausea. DISCHARGE SUMMARY from Nurse The following personal items collected during your admission are returned to you:  
Dental Appliance: Dental Appliances: None (two caps on bottom) Vision: Visual Aid: Glasses Hearing Aid:   
Jewelry:   
Clothing:   
Other Valuables:   
Valuables sent to safe:   
 
 
PATIENT INSTRUCTIONS: 
 
After general anesthesia or intravenous sedation, for 24 hours or while taking prescription Narcotics: · Someone should be with you for the next 24 hours. · For your own safety, a responsible adult must drive you home. · Limit your activities · Recommended activity: Rest today and no driving until after your appointment with Dr. Vonda Schneider tomorrow. · Do not drive and operate hazardous machinery · Do not make important personal or business decisions · Do  not drink alcoholic beverages · If you have not urinated within 8 hours after discharge, please contact your surgeon on call. Report the following to your surgeon: 
· Excessive pain, swelling, redness or odor of or around the surgical area · Temperature over 100.5 · Any nausea and vomiting ·  
· You will receive a Post Operative Call from one of the Recovery Room Nurses on the day after your surgery to check on you. It is very important for us to know how you are recovering after your surgery. ·  
· You may receive an e-mail or letter in the mail from [place] regarding your experience with us in the Ambulatory Surgery Unit. Your feedback is valuable to us and we appreciate your participation in the survey. ·  
 
·  
· If the above instructions are not adequate, please contact Karson De Leon RN, Melissa anesthesia Nurse Manager or our Anesthesiologist, at [phone]. ·  
· We wish you a speedy recovery ? What to do at Home: *  Please give a list of your current medications to your Primary Care Provider. *  Please update this list whenever your medications are discontinued, doses are 
    changed, or new medications (including over-the-counter products) are added. *  Please carry medication information at all times in case of emergency situations. These are general instructions for a healthy lifestyle: No smoking/ No tobacco products/ Avoid exposure to second hand smoke Surgeon General's Warning:  Quitting smoking now greatly reduces serious risk to your health. Obesity, smoking, and sedentary lifestyle greatly increases your risk for illness A healthy diet, regular physical exercise & weight monitoring are important for maintaining a healthy lifestyle You may be retaining fluid if you have a history of heart failure or if you experience any of the following symptoms:  Weight gain of 3 pounds or more overnight or 5 pounds in a week, increased swelling in our hands or feet or shortness of breath while lying flat in bed. Please call your doctor as soon as you notice any of these symptoms; do not wait until your next office visit. Recognize signs and symptoms of STROKE: 
 
F-face looks uneven A-arms unable to move or move even S-speech slurred or non-existent T-time-call 911 as soon as signs and symptoms begin-DO NOT go Back to bed or wait to see if you get better-TIME IS BRAIN. If you have not had your influenza or pneumococcal vaccines, please follow up with your primary care physician. The discharge information has been reviewed with the patient and caregiver. The patient and caregiver verbalized understanding. Discharge Orders None Introducing hospitals & [facility] SERVICES! Dear Holley Odom: Thank you for requesting a [employer] account. Our records indicate that you already have an active [employer] account. You can access your account anytime at [email or web address] Did you know that you can access your hospital and ER discharge instructions at any time in [employer]? You can also review all of your test results from your hospital stay or ER visit. Additional Information If you have questions, please visit the Frequently Asked Questions section of the [employer] website at [email or web address] Remember, [employer] is NOT to be used for urgent needs. For medical emergencies, dial 911. Now available from your iPhone and Android! General Information Please provide this summary of care documentation to your next provider. Patient Signature:  ____________________________________________________________ Date:  ____________________________________________________________  
  
Ulyess Salaam Provider Signature:  ____________________________________________________________ Date:  ____________________________________________________________

## 2017-05-31 NOTE — DISCHARGE INSTRUCTIONS
Amarjit Martinez D.O., P.CLaurie  [facility].  [phone]    Post-Operative Instructions for I Stent - Cataract Surgery     Remove your eye shield and bandage at 12 noon the same day as surgery and start your eye drops. THROW AWAY THE GAUZE UNDER THE SHIELD.  Place the blue eye shield back on for one week while asleep. Use the tape included in your bag.  DO NOT RUB YOUR EYE EVER! DO NOT WEAR EYE MAKEUP FOR 1 WEEK!  You may take a bath today and you can shower starting tomorrow.  You may resume your previous diet.  If you use glaucoma drops in the operative eye, stop using them. May continue in non-operative eye as directed by Dr Marlene Mosquera.  Common symptoms after surgery include a scratchy feeling, slight headache, red eye, and/or blurred vision. You may use Advil or Tylenol for any discomfort.  Avoid strenuous activities and driving until you see Dr. Marlene Mosquera tomorrow. Please use care when walking, your depth perception may be altered.  Bring your bag with your drops to your follow up appointment. Below are Instructions On How To Use Your Eye Drops. ON THE DAY OF SURGERY:     Use all three eye drops at 12 noon, 4pm, and 8pm.  Wait 10 minutes between drops. THE DAY AFTER SURGERY:     You will use the drops 4 times a day at 8am, 12 noon, 4pm, and 8pm.   Use the drops every day until bottles are empty. Vigamox (tan top) Put 1 drop in at 8am, 12 noon, 4pm, and 8pm.    Pred Acetate (pink top) Put 1 drop in at 8:10am, 12:10pm, 4:10pm, and 8:10pm. Shake before using. Diclofenac Put 1 drop in at 8:20am, 12:20pm, 4:20pm, 8:20pm.    CONTINUE DROPS UNTIL ALL BOTTLES ARE EMPTY! Follow-up appointment tomorrow at Dr. Hong Loges office:____________________    Please call the office at [phone] if you experience severe pain or nausea.        DISCHARGE SUMMARY from Nurse    The following personal items collected during your admission are returned to you:   Dental Appliance: Dental Appliances: None (two caps on bottom)  Vision: Visual Aid: Glasses  Hearing Aid:    Jewelry:    Clothing:    Other Valuables:    Valuables sent to safe:        PATIENT INSTRUCTIONS:    After general anesthesia or intravenous sedation, for 24 hours or while taking prescription Narcotics:  · Someone should be with you for the next 24 hours. · For your own safety, a responsible adult must drive you home. · Limit your activities  · Recommended activity: Rest today and no driving until after your appointment with Dr. Hoda Domínguez tomorrow. · Do not drive and operate hazardous machinery  · Do not make important personal or business decisions  · Do  not drink alcoholic beverages  · If you have not urinated within 8 hours after discharge, please contact your surgeon on call. Report the following to your surgeon:  · Excessive pain, swelling, redness or odor of or around the surgical area  · Temperature over 100.5  · Any nausea and vomiting   ·   · You will receive a Post Operative Call from one of the Recovery Room Nurses on the day after your surgery to check on you. It is very important for us to know how you are recovering after your surgery. ·   · You may receive an e-mail or letter in the mail from [place] regarding your experience with us in the Ambulatory Surgery Unit. Your feedback is valuable to us and we appreciate your participation in the survey. ·     ·   · If the above instructions are not adequate, please contact Gerald Dandy, RN, Melissa anesthesia Nurse Manager or our Anesthesiologist, at [phone]. ·   · We wish you a speedy recovery ? What to do at Home:      *  Please give a list of your current medications to your Primary Care Provider. *  Please update this list whenever your medications are discontinued, doses are      changed, or new medications (including over-the-counter products) are added.     *  Please carry medication information at all times in case of emergency situations. These are general instructions for a healthy lifestyle:    No smoking/ No tobacco products/ Avoid exposure to second hand smoke    Surgeon General's Warning:  Quitting smoking now greatly reduces serious risk to your health. Obesity, smoking, and sedentary lifestyle greatly increases your risk for illness    A healthy diet, regular physical exercise & weight monitoring are important for maintaining a healthy lifestyle    You may be retaining fluid if you have a history of heart failure or if you experience any of the following symptoms:  Weight gain of 3 pounds or more overnight or 5 pounds in a week, increased swelling in our hands or feet or shortness of breath while lying flat in bed. Please call your doctor as soon as you notice any of these symptoms; do not wait until your next office visit. Recognize signs and symptoms of STROKE:    F-face looks uneven    A-arms unable to move or move even    S-speech slurred or non-existent    T-time-call 911 as soon as signs and symptoms begin-DO NOT go       Back to bed or wait to see if you get better-TIME IS BRAIN. If you have not had your influenza or pneumococcal vaccines, please follow up with your primary care physician. The discharge information has been reviewed with the patient and caregiver. The patient and caregiver verbalized understanding.

## 2017-05-31 NOTE — ANESTHESIA POSTPROCEDURE EVALUATION
Post-Anesthesia Evaluation and Assessment    Patient: Kei Zambrano MRN: [id number]  SSN: [id number]    YOB: 1950  Age: 73 y.o. Sex: female       Cardiovascular Function/Vital Signs  Visit Vitals    /70    Pulse (!) 49    Temp 36.6 °C (97.9 °F)    Resp 13    Ht 5' 4\" (1.626 m)    Wt 74.9 kg (165 lb 2 oz)    SpO2 96%    BMI 28.34 kg/m2       Patient is status post MAC anesthesia for Procedure(s):  CATARACT EXTRACTION WITH INTRA OCULAR LENS IMPLANT RIGHT EYE 1ST WITH I-STENT INSERTION (LRI). Nausea/Vomiting: None    Postoperative hydration reviewed and adequate. Pain:  Pain Scale 1: Numeric (0 - 10) (05/31/17 0919)  Pain Intensity 1: 0 (05/31/17 0919)   Managed    Neurological Status:   Neuro (WDL): Within Defined Limits (05/31/17 0919)   At baseline    Mental Status and Level of Consciousness: Arousable    Pulmonary Status:   O2 Device: Room air (05/31/17 0919)   Adequate oxygenation and airway patent    Complications related to anesthesia: None    Post-anesthesia assessment completed.  No concerns    Signed By: Verenice Esparza MD     May 31, 2017

## 2017-05-31 NOTE — PERIOP NOTES
Appl  1950  [id number]    Situation:  Verbal report given from: MELISSA Shaw RN & Alex Hilton CRNA  Procedure: Procedure(s):  CATARACT EXTRACTION WITH INTRA OCULAR LENS IMPLANT RIGHT EYE 1ST WITH I-STENT INSERTION (LRI)    Background:    Preoperative diagnosis: CATARACT RIGHT EYE    Postoperative diagnosis: CATARACT RIGHT EYE    :  Dr. Townsend Leader    Assistant(s): Circ-1: Josh Henry RN  Scrub Tech-1: RT Lester    Specimens: * No specimens in log *    Assessment:  Intra-procedure medications         Anesthesia gave intra-procedure sedation and medications, see anesthesia flow sheet     Intravenous fluids: NS@ KVO     Vital signs stable       Recommendation:    Permission to share finding with family or friend yes

## 2017-06-02 PROBLEM — H40.1121 PRIMARY OPEN ANGLE GLAUCOMA OF LEFT EYE, MILD STAGE: Status: ACTIVE | Noted: 2017-06-02

## 2017-06-02 PROBLEM — H25.812 COMBINED FORMS OF AGE-RELATED CATARACT OF LEFT EYE: Status: ACTIVE | Noted: 2017-06-02

## 2017-06-05 RX ORDER — MOXIFLOXACIN 5 MG/ML
1 SOLUTION/ DROPS OPHTHALMIC 4 TIMES DAILY
COMMUNITY
End: 2017-10-30

## 2017-06-05 NOTE — PERIOP NOTES
[facility]  Ambulatory Surgery Unit  Pre-operative Instructions    Surgery/Procedure Date  6/7/17            Tentative Arrival Time 8716      1. On the day of your surgery/procedure, please report to the Ambulatory Surgery Unit Registration Desk and sign in at your designated time. The Ambulatory Surgery Unit is located in [facility] on the [facility] side of the \[facility] [place] [facility]\"" across from the [facility]. Please have all of your health insurance cards and a photo ID. 2. You must have someone with you to drive you home, as you should not drive a car for 24 hours following anesthesia. Please make arrangements for a responsible adult friend or family member to stay with you for at least the first 24 hours after your surgery. 3. Do not have anything to eat or drink (including water, gum, mints, coffee, juice) after midnight   6/6/17. This may not apply to medications prescribed by your physician. (Please note below the special instructions with medications to take the morning of surgery, if applicable.)    4. We recommend you do not drink any alcoholic beverages for 24 hours before and after your surgery. 5. Stop all Aspirin, non-steroidal anti-inflammatory drugs (i.e. Advil, Aleve), vitamins, and supplements as directed by your surgeon's office. **If you are currently taking Plavix, Coumadin, or other blood-thinning agents, contact your surgeon for instructions. **    6. In an effort to help prevent surgical site infection, we ask that you shower with an anti-bacterial soap (i.e. Dial or Safeguard) for 3 days prior to and on the morning of surgery, using a fresh towel after each shower. (Please begin this process with fresh bed linens.) Do not apply any lotions, powders, or deodorants after the shower on the day of your procedure. If applicable, please do not shave the operative site for 48 hours prior to surgery. 7. Wear comfortable clothes. Wear glasses instead of contacts. Do not bring any jewelry or money (other than copays or fees as instructed). Do not wear make-up, particularly mascara, the morning of your surgery. Do not wear nail polish, particularly if you are having foot /hand surgery. Wear your hair loose or down, no ponytails, buns, pratik pins or clips. All body piercings must be removed. 8. You should understand that if you do not follow these instructions your surgery may be cancelled. If your physical condition changes (i.e. fever, cold or flu) please contact your surgeon as soon as possible. 9. It is important that you be on time. If a situation occurs where you may be late, or if you have any questions or problems, please call [phone].    10. Your surgery time may be subject to change. You will receive a phone call the day prior to surgery to confirm your arrival time. 11. Pediatric patients: please bring a change of clothes, diapers, bottle/sippy cup, pacifier, etc.      Special Instructions: Take all medications and inhalers, as prescribed, on the morning of surgery with a sip of water EXCEPT: None      I understand a pre-operative phone call will be made to verify my surgery time. In the event that I am not available, I give permission for a message to be left on my answering service and/or with another person? Yes    Instructions given to patient during pat phone call.  Patient verbalized understanding.         ___________________      ___________________      ________________  (Signature of Patient)          (Witness)                   (Date and Time)

## 2017-06-06 ENCOUNTER — ANESTHESIA EVENT (OUTPATIENT)
Dept: SURGERY | Age: 67
End: 2017-06-06
Payer: MEDICARE

## 2017-06-07 ENCOUNTER — HOSPITAL ENCOUNTER (OUTPATIENT)
Age: 67
Setting detail: OUTPATIENT SURGERY
Discharge: HOME | End: 2017-06-07
Attending: OPHTHALMOLOGY | Admitting: OPHTHALMOLOGY
Payer: MEDICARE

## 2017-06-07 ENCOUNTER — ANESTHESIA (OUTPATIENT)
Dept: SURGERY | Age: 67
End: 2017-06-07
Payer: MEDICARE

## 2017-06-07 VITALS
WEIGHT: 164.2 LBS | DIASTOLIC BLOOD PRESSURE: 85 MMHG | SYSTOLIC BLOOD PRESSURE: 133 MMHG | HEART RATE: 53 BPM | TEMPERATURE: 98.3 F | OXYGEN SATURATION: 97 % | BODY MASS INDEX: 28.03 KG/M2 | RESPIRATION RATE: 13 BRPM | HEIGHT: 64 IN

## 2017-06-07 PROCEDURE — 74011250636 HC RX REV CODE- 250/636: Performed by: OPHTHALMOLOGY

## 2017-06-07 PROCEDURE — 76030000000 HC AMB SURG OR TIME 0.5 TO 1: Performed by: OPHTHALMOLOGY

## 2017-06-07 PROCEDURE — V2632 POST CHMBR INTRAOCULAR LENS: HCPCS | Performed by: OPHTHALMOLOGY

## 2017-06-07 PROCEDURE — 77030018846 HC SOL IRR STRL H20 ICUM -A: Performed by: OPHTHALMOLOGY

## 2017-06-07 PROCEDURE — 74011250636 HC RX REV CODE- 250/636

## 2017-06-07 PROCEDURE — 76060000061 HC AMB SURG ANES 0.5 TO 1 HR: Performed by: OPHTHALMOLOGY

## 2017-06-07 PROCEDURE — 74011000250 HC RX REV CODE- 250: Performed by: OPHTHALMOLOGY

## 2017-06-07 PROCEDURE — 77030013854 HC PK PHACO KT ALCN -C: Performed by: OPHTHALMOLOGY

## 2017-06-07 PROCEDURE — C1783 OCULAR IMP, AQUEOUS DRAIN DE: HCPCS | Performed by: OPHTHALMOLOGY

## 2017-06-07 PROCEDURE — 76210000046 HC AMBSU PH II REC FIRST 0.5 HR: Performed by: OPHTHALMOLOGY

## 2017-06-07 PROCEDURE — 74011000250 HC RX REV CODE- 250

## 2017-06-07 DEVICE — LENS IOL POST 1-PC 6X13 15.0 -- ACRYSOF: Type: IMPLANTABLE DEVICE | Site: EYE | Status: FUNCTIONAL

## 2017-06-07 DEVICE — TRABECULAR MICRO-BYPASS STENT SYSTEM - LEFT
Type: IMPLANTABLE DEVICE | Site: EYE | Status: FUNCTIONAL
Brand: ISTENT

## 2017-06-07 RX ORDER — MIDAZOLAM HYDROCHLORIDE 1 MG/ML
INJECTION, SOLUTION INTRAMUSCULAR; INTRAVENOUS AS NEEDED
Status: DISCONTINUED | OUTPATIENT
Start: 2017-06-07 | End: 2017-06-07 | Stop reason: HOSPADM

## 2017-06-07 RX ORDER — DICLOFENAC SODIUM 1 MG/ML
1 SOLUTION/ DROPS OPHTHALMIC
Status: COMPLETED | OUTPATIENT
Start: 2017-06-07 | End: 2017-06-07

## 2017-06-07 RX ORDER — ONDANSETRON 2 MG/ML
INJECTION INTRAMUSCULAR; INTRAVENOUS AS NEEDED
Status: DISCONTINUED | OUTPATIENT
Start: 2017-06-07 | End: 2017-06-07 | Stop reason: HOSPADM

## 2017-06-07 RX ORDER — FENTANYL CITRATE 50 UG/ML
INJECTION, SOLUTION INTRAMUSCULAR; INTRAVENOUS AS NEEDED
Status: DISCONTINUED | OUTPATIENT
Start: 2017-06-07 | End: 2017-06-07 | Stop reason: HOSPADM

## 2017-06-07 RX ORDER — LIDOCAINE HYDROCHLORIDE 10 MG/ML
0.1 INJECTION, SOLUTION EPIDURAL; INFILTRATION; INTRACAUDAL; PERINEURAL AS NEEDED
Status: DISCONTINUED | OUTPATIENT
Start: 2017-06-07 | End: 2017-06-07 | Stop reason: HOSPADM

## 2017-06-07 RX ORDER — SODIUM CHLORIDE 0.9 % (FLUSH) 0.9 %
5-10 SYRINGE (ML) INJECTION AS NEEDED
Status: DISCONTINUED | OUTPATIENT
Start: 2017-06-07 | End: 2017-06-07 | Stop reason: HOSPADM

## 2017-06-07 RX ORDER — GENTAMICIN SULFATE 0.3 %
0.5 OINTMENT (GRAM) OPHTHALMIC (EYE) 3 TIMES DAILY
Status: DISCONTINUED | OUTPATIENT
Start: 2017-06-07 | End: 2017-06-07 | Stop reason: HOSPADM

## 2017-06-07 RX ORDER — PHENYLEPHRINE HYDROCHLORIDE 25 MG/ML
1 SOLUTION/ DROPS OPHTHALMIC
Status: COMPLETED | OUTPATIENT
Start: 2017-06-07 | End: 2017-06-07

## 2017-06-07 RX ORDER — SODIUM CHLORIDE 0.9 % (FLUSH) 0.9 %
5-10 SYRINGE (ML) INJECTION EVERY 8 HOURS
Status: DISCONTINUED | OUTPATIENT
Start: 2017-06-07 | End: 2017-06-07 | Stop reason: HOSPADM

## 2017-06-07 RX ORDER — SODIUM CHLORIDE, SODIUM LACTATE, POTASSIUM CHLORIDE, CALCIUM CHLORIDE 600; 310; 30; 20 MG/100ML; MG/100ML; MG/100ML; MG/100ML
25 INJECTION, SOLUTION INTRAVENOUS CONTINUOUS
Status: DISCONTINUED | OUTPATIENT
Start: 2017-06-07 | End: 2017-06-07 | Stop reason: HOSPADM

## 2017-06-07 RX ORDER — CYCLOPENTOLATE HYDROCHLORIDE 20 MG/ML
SOLUTION/ DROPS OPHTHALMIC
Status: COMPLETED
Start: 2017-06-07 | End: 2017-06-07

## 2017-06-07 RX ORDER — LIDOCAINE HYDROCHLORIDE 40 MG/ML
3 INJECTION, SOLUTION RETROBULBAR; TOPICAL ONCE
Status: DISCONTINUED | OUTPATIENT
Start: 2017-06-07 | End: 2017-06-07 | Stop reason: HOSPADM

## 2017-06-07 RX ORDER — PROPARACAINE HYDROCHLORIDE 5 MG/ML
SOLUTION/ DROPS OPHTHALMIC
Status: COMPLETED
Start: 2017-06-07 | End: 2017-06-07

## 2017-06-07 RX ORDER — PROPARACAINE HYDROCHLORIDE 5 MG/ML
1 SOLUTION/ DROPS OPHTHALMIC
Status: COMPLETED | OUTPATIENT
Start: 2017-06-07 | End: 2017-06-07

## 2017-06-07 RX ORDER — ONDANSETRON 2 MG/ML
4 INJECTION INTRAMUSCULAR; INTRAVENOUS AS NEEDED
Status: DISCONTINUED | OUTPATIENT
Start: 2017-06-07 | End: 2017-06-07 | Stop reason: HOSPADM

## 2017-06-07 RX ORDER — DIPHENHYDRAMINE HYDROCHLORIDE 50 MG/ML
12.5 INJECTION, SOLUTION INTRAMUSCULAR; INTRAVENOUS AS NEEDED
Status: DISCONTINUED | OUTPATIENT
Start: 2017-06-07 | End: 2017-06-07 | Stop reason: HOSPADM

## 2017-06-07 RX ORDER — LIDOCAINE HYDROCHLORIDE 10 MG/ML
0.5 INJECTION, SOLUTION EPIDURAL; INFILTRATION; INTRACAUDAL; PERINEURAL ONCE
Status: COMPLETED | OUTPATIENT
Start: 2017-06-07 | End: 2017-06-07

## 2017-06-07 RX ORDER — SODIUM CHLORIDE 9 MG/ML
25 INJECTION, SOLUTION INTRAVENOUS CONTINUOUS
Status: DISCONTINUED | OUTPATIENT
Start: 2017-06-07 | End: 2017-06-07 | Stop reason: HOSPADM

## 2017-06-07 RX ORDER — FENTANYL CITRATE 50 UG/ML
25 INJECTION, SOLUTION INTRAMUSCULAR; INTRAVENOUS
Status: DISCONTINUED | OUTPATIENT
Start: 2017-06-07 | End: 2017-06-07 | Stop reason: HOSPADM

## 2017-06-07 RX ORDER — PHENYLEPHRINE HYDROCHLORIDE 25 MG/ML
SOLUTION/ DROPS OPHTHALMIC
Status: COMPLETED
Start: 2017-06-07 | End: 2017-06-07

## 2017-06-07 RX ORDER — PREDNISOLONE ACETATE 10 MG/ML
1 SUSPENSION/ DROPS OPHTHALMIC 2 TIMES DAILY
Status: DISCONTINUED | OUTPATIENT
Start: 2017-06-07 | End: 2017-06-07 | Stop reason: HOSPADM

## 2017-06-07 RX ORDER — CYCLOPENTOLATE HYDROCHLORIDE 20 MG/ML
1 SOLUTION/ DROPS OPHTHALMIC
Status: COMPLETED | OUTPATIENT
Start: 2017-06-07 | End: 2017-06-07

## 2017-06-07 RX ADMIN — MIDAZOLAM HYDROCHLORIDE 1 MG: 1 INJECTION, SOLUTION INTRAMUSCULAR; INTRAVENOUS at 09:40

## 2017-06-07 RX ADMIN — PROPARACAINE HYDROCHLORIDE 1 DROP: 5 SOLUTION/ DROPS OPHTHALMIC at 09:20

## 2017-06-07 RX ADMIN — CYCLOPENTOLATE HYDROCHLORIDE 1 DROP: 20 SOLUTION/ DROPS OPHTHALMIC at 09:25

## 2017-06-07 RX ADMIN — MIDAZOLAM HYDROCHLORIDE 1 MG: 1 INJECTION, SOLUTION INTRAMUSCULAR; INTRAVENOUS at 09:39

## 2017-06-07 RX ADMIN — PHENYLEPHRINE HYDROCHLORIDE 1 DROP: 25 SOLUTION/ DROPS OPHTHALMIC at 09:21

## 2017-06-07 RX ADMIN — PROPARACAINE HYDROCHLORIDE 1 DROP: 5 SOLUTION/ DROPS OPHTHALMIC at 09:25

## 2017-06-07 RX ADMIN — PROPARACAINE HYDROCHLORIDE 1 DROP: 5 SOLUTION/ DROPS OPHTHALMIC at 09:29

## 2017-06-07 RX ADMIN — PROPARACAINE HYDROCHLORIDE 1 DROP: 5 SOLUTION/ DROPS OPHTHALMIC at 09:30

## 2017-06-07 RX ADMIN — PHENYLEPHRINE HYDROCHLORIDE 1 DROP: 25 SOLUTION/ DROPS OPHTHALMIC at 09:30

## 2017-06-07 RX ADMIN — DICLOFENAC SODIUM 1 DROP: 1 SOLUTION/ DROPS OPHTHALMIC at 09:25

## 2017-06-07 RX ADMIN — DICLOFENAC SODIUM 1 DROP: 1 SOLUTION/ DROPS OPHTHALMIC at 09:21

## 2017-06-07 RX ADMIN — DICLOFENAC SODIUM 1 DROP: 1 SOLUTION/ DROPS OPHTHALMIC at 09:11

## 2017-06-07 RX ADMIN — SODIUM CHLORIDE 25 ML/HR: 900 INJECTION, SOLUTION INTRAVENOUS at 09:22

## 2017-06-07 RX ADMIN — PROPARACAINE HYDROCHLORIDE 1 DROP: 5 SOLUTION/ DROPS OPHTHALMIC at 09:11

## 2017-06-07 RX ADMIN — DICLOFENAC SODIUM 1 DROP: 1 SOLUTION/ DROPS OPHTHALMIC at 09:30

## 2017-06-07 RX ADMIN — FENTANYL CITRATE 50 MCG: 50 INJECTION, SOLUTION INTRAMUSCULAR; INTRAVENOUS at 09:50

## 2017-06-07 RX ADMIN — CYCLOPENTOLATE HYDROCHLORIDE 1 DROP: 20 SOLUTION/ DROPS OPHTHALMIC at 09:11

## 2017-06-07 RX ADMIN — CYCLOPENTOLATE HYDROCHLORIDE 1 DROP: 20 SOLUTION/ DROPS OPHTHALMIC at 09:30

## 2017-06-07 RX ADMIN — PHENYLEPHRINE HYDROCHLORIDE 1 DROP: 25 SOLUTION/ DROPS OPHTHALMIC at 09:11

## 2017-06-07 RX ADMIN — CYCLOPENTOLATE HYDROCHLORIDE 1 DROP: 20 SOLUTION/ DROPS OPHTHALMIC at 09:20

## 2017-06-07 RX ADMIN — ONDANSETRON 4 MG: 2 INJECTION INTRAMUSCULAR; INTRAVENOUS at 09:50

## 2017-06-07 RX ADMIN — PHENYLEPHRINE HYDROCHLORIDE 1 DROP: 25 SOLUTION/ DROPS OPHTHALMIC at 09:25

## 2017-06-07 NOTE — IP AVS SNAPSHOT
[address] [facility] 
[phone] Patient: Parul Garcia MRN: [id number]:[id number] You are allergic to the following Allergen Reactions Latex Rash Codeine Nausea and Vomiting Thiuram Analogues Rash Recent Documentation Height Weight BMI OB Status Smoking Status 1.626 m 74.5 kg 28.18 kg/m2 Hysterectomy Never Smoker Emergency Contacts Name Discharge Info Relation Home Work Mobile Tomas Damon DISCHARGE CAREGIVER [3] Spouse [3] [phone] About your hospitalization You were admitted on:  June 7, 2017 You last received care in the:  [facility] You were discharged on:  June 7, 2017 Unit phone number:  [phone] Why you were hospitalized Your primary diagnosis was:  Primary Open Angle Glaucoma Of Left Eye, Mild Stage Your diagnoses also included:  Combined Forms Of Age-Related Cataract Of Left Eye  
  
  
 
  
  
Providers Seen During Your Hospitalizations Provider Role Specialty Primary office phone Shannon Raphael DO Attending Provider Ophthalmology [phone] Your Primary Care Physician (PCP) Primary Care Physician Office Phone Office Fax Destiny Silveira [phone] Follow-up Information Follow up With Details Comments Contact Info MD Abril   [facility] [address] [facility] 
[phone] Your Appointments Wednesday June 07, 2017 CATARACT EXTRACTION WITH INTRA OCULAR LENS IMPLANT with Shannon Raphael DO  
[facility] AMB SURGERY UNIT (RI OR PRE ASSESSMENT) [address] [facility]  
[phone] Current Discharge Medication List  
  
ASK your doctor about these medications Dose & Instructions Dispensing Information Comments Morning Noon Evening Bedtime albuterol 90 mcg/actuation inhaler Commonly known as:  PROVENTIL HFA, VENTOLIN HFA, PROAIR HFA Your last dose was: Your next dose is:    
   
   
 Dose:  1 Puff Take 1 Puff by inhalation every four (4) hours as needed for Wheezing. Refills:  0 AMBIEN 5 mg tablet Generic drug:  zolpidem Your last dose was: Your next dose is:    
   
   
 Dose:  5 mg Take 5 mg by mouth nightly as needed for Sleep. Refills:  0  
     
   
   
   
  
 amitriptyline 25 mg tablet Commonly known as:  ELAVIL Your last dose was: Your next dose is:    
   
   
 Dose:  25 mg Take 1 Tab by mouth nightly. Quantity:  30 Tab Refills:  1  
     
   
   
   
  
 aspirin 81 mg chewable tablet Your last dose was: Your next dose is:    
   
   
 Dose:  81 mg Take 81 mg by mouth daily. Refills:  0  
     
   
   
   
  
 calcium carbonate 500 mg calcium (1,250 mg) tablet Commonly known as:  OS-VJ Your last dose was: Your next dose is:    
   
   
 Dose:  1 Tab Take 1 Tab by mouth daily. Refills:  0  
     
   
   
   
  
 CRESTOR 10 mg tablet Generic drug:  rosuvastatin Your last dose was: Your next dose is:    
   
   
 Dose:  10 mg Take 10 mg by mouth nightly. Refills:  0  
     
   
   
   
  
 desonide 0.05 % cream  
Commonly known as:  Alexandre Moll Your last dose was: Your next dose is:    
   
   
 Apply  to affected area as needed. Refills:  0  
     
   
   
   
  
 FISH OIL 1,000 mg Cap Generic drug:  omega-3 fatty acids-vitamin e Your last dose was: Your next dose is:    
   
   
 Dose:  3 Cap Take 3 Caps by mouth daily. Refills:  0  
     
   
   
   
  
 FLONASE 50 mcg/actuation nasal spray Generic drug:  fluticasone Your last dose was: Your next dose is:    
   
   
 Dose:  2 Spray 2 Sprays by Both Nostrils route daily. Refills:  0 MUCINEX 600 mg ER tablet Generic drug:  guaiFENesin ER Your last dose was: Your next dose is:    
   
   
 Dose:  600 mg Take 600 mg by mouth two (2) times a day. Refills:  0  
     
   
   
   
  
 omeprazole 20 mg capsule Commonly known as:  PRILOSEC Your last dose was: Your next dose is:    
   
   
 Dose:  40 mg Take 2 Caps by mouth daily. Quantity:  60 Cap Refills:  2  
     
   
   
   
  
 timolol 0.5 % ophthalmic solution Commonly known as:  TIMOPTIC Your last dose was: Your next dose is:    
   
   
 Dose:  1 Drop Administer 1 Drop to both eyes daily. Refills:  0  
     
   
   
   
  
 traMADol 50 mg tablet Commonly known as:  ULTRAM  
   
Your last dose was: Your next dose is:    
   
   
 Dose:  50 mg Take 1 Tab by mouth every twelve (12) hours as needed for Pain. Max Daily Amount: 100 mg. Quantity:  30 Tab Refills:  1  
     
   
   
   
  
 valsartan-hydroCHLOROthiazide 320-25 mg per tablet Commonly known as:  DIOVAN-HCT Your last dose was: Your next dose is:    
   
   
 Dose:  1 Tab Take 1 Tab by mouth daily. Refills:  0  
     
   
   
   
  
 * VIGAMOX 0.5 % ophthalmic solution Generic drug:  moxifloxacin Your last dose was: Your next dose is:    
   
   
 Dose:  1 Drop Administer 1 Drop to left eye four (4) times daily. Refills:  0  
     
   
   
   
  
 * VIGAMOX 0.5 % ophthalmic solution Generic drug:  moxifloxacin Your last dose was: Your next dose is:    
   
   
 Dose:  1 Drop Administer 1 Drop to right eye four (4) times daily. Refills:  0  
     
   
   
   
  
 * Notice: This list has 2 medication(s) that are the same as other medications prescribed for you. Read the directions carefully, and ask your doctor or other care provider to review them with you. Discharge Instructions Josi Romero D.O., P.CLaurie 
[facility] [address]. 
[phone] Post-Operative Instructions for I Stent - Cataract Surgery ? Remove your eye shield and bandage at 12 noon the same day as surgery and start your eye drops. THROW AWAY THE GAUZE UNDER THE SHIELD. ? Place the blue eye shield back on for one week while asleep. Use the tape included in your bag. 
       
 
? DO NOT RUB YOUR EYE EVER! DO NOT WEAR EYE MAKEUP FOR 1 WEEK! 
 
 
? You may take a bath today and you can shower starting tomorrow. ? You may resume your previous diet. ? If you use glaucoma drops in the operative eye, stop using them. May continue in non-operative eye as directed by Dr Sherley Morales. ? Common symptoms after surgery include a scratchy feeling, slight headache, red eye, and/or blurred vision. You may use Advil or Tylenol for any discomfort. ? Avoid strenuous activities and driving until you see Dr. Sherley Morales tomorrow. Please use care when walking, your depth perception may be altered. ? Bring your bag with your drops to your follow up appointment. Below are Instructions On How To Use Your Eye Drops. ON THE DAY OF SURGERY: 
 
? Use all three eye drops at 12 noon, 4pm, and 8pm.  Wait 10 minutes between drops. THE DAY AFTER SURGERY: 
 
? You will use the drops 4 times a day at 8am, 12 noon, 4pm, and 8pm. 
? Use the drops every day until bottles are empty. Vigamox (tan top) Put 1 drop in at 8am, 12 noon, 4pm, and 8pm. 
 
Pred Acetate (pink top) Put 1 drop in at 8:10am, 12:10pm, 4:10pm, and 8:10pm. Shake before using. Diclofenac Put 1 drop in at 8:20am, 12:20pm, 4:20pm, 8:20pm. 
 
CONTINUE DROPS UNTIL ALL BOTTLES ARE EMPTY! Follow-up appointment tomorrow at Dr. Lexus Reeder office:____________________ Please call the office at [phone] if you experience severe pain or nausea. DISCHARGE SUMMARY from Nurse The following personal items collected during your admission are returned to you:  
Dental Appliance: Dental Appliances: None Vision:   
Hearing Aid:   
Jewelry: Jewelry: Ring (with patient) Clothing: Clothing: None Other Valuables: Other Valuables: None Valuables sent to safe:   
 
 
PATIENT INSTRUCTIONS: 
 
After general anesthesia or intravenous sedation, for 24 hours or while taking prescription Narcotics: · Someone should be with you for the next 24 hours. · For your own safety, a responsible adult must drive you home. · Limit your activities · Recommended activity: Rest today and no driving until after your appointment with Dr. Angel Redman tomorrow. · Do not drive and operate hazardous machinery · Do not make important personal or business decisions · Do  not drink alcoholic beverages · If you have not urinated within 8 hours after discharge, please contact your surgeon on call. Report the following to your surgeon: 
· Excessive pain, swelling, redness or odor of or around the surgical area · Temperature over 100.5 · Any nausea and vomiting ·  
· You will receive a Post Operative Call from one of the Recovery Room Nurses on the day after your surgery to check on you. It is very important for us to know how you are recovering after your surgery. ·  
· You may receive an e-mail or letter in the mail from [place] regarding your experience with us in the Ambulatory Surgery Unit. Your feedback is valuable to us and we appreciate your participation in the survey. ·  
 
·  
· If the above instructions are not adequate, please contact Selina Rocha RN, Melissa anesthesia Nurse Manager or our Anesthesiologist, at [phone]. ·  
· We wish you a speedy recovery ? What to do at Home: *  Please give a list of your current medications to your Primary Care Provider.  
 
*  Please update this list whenever your medications are discontinued, doses are 
 changed, or new medications (including over-the-counter products) are added. *  Please carry medication information at all times in case of emergency situations. These are general instructions for a healthy lifestyle: No smoking/ No tobacco products/ Avoid exposure to second hand smoke Surgeon General's Warning:  Quitting smoking now greatly reduces serious risk to your health. Obesity, smoking, and sedentary lifestyle greatly increases your risk for illness A healthy diet, regular physical exercise & weight monitoring are important for maintaining a healthy lifestyle You may be retaining fluid if you have a history of heart failure or if you experience any of the following symptoms:  Weight gain of 3 pounds or more overnight or 5 pounds in a week, increased swelling in our hands or feet or shortness of breath while lying flat in bed. Please call your doctor as soon as you notice any of these symptoms; do not wait until your next office visit. Recognize signs and symptoms of STROKE: 
 
F-face looks uneven A-arms unable to move or move even S-speech slurred or non-existent T-time-call 911 as soon as signs and symptoms begin-DO NOT go Back to bed or wait to see if you get better-TIME IS BRAIN. If you have not had your influenza or pneumococcal vaccines, please follow up with your primary care physician. The discharge information has been reviewed with the patient and caregiver. The patient and caregiver verbalized understanding. Discharge Orders None Introducing [facility]! Dear Isidra Agustin: Thank you for requesting a [employer] account. Our records indicate that you already have an active [employer] account. You can access your account anytime at [email or web address]. [email or web address]/[employer] Did you know that you can access your hospital and ER discharge instructions at any time in [employer]?   You can also review all of your test results from your hospital stay or ER visit. Additional Information If you have questions, please visit the Frequently Asked Questions section of the [employer] website at [email or web address] Remember, MyChart is NOT to be used for urgent needs. For medical emergencies, dial 911. Now available from your iPhone and Android! General Information Please provide this summary of care documentation to your next provider. Patient Signature:  ____________________________________________________________ Date:  ____________________________________________________________  
  
Jamila Pacheco Provider Signature:  ____________________________________________________________ Date:  ____________________________________________________________

## 2017-06-07 NOTE — OP NOTES
PREOPERATIVE DIAGNOSES:   1. Primary open angle glaucoma, uncontrolled, left  2. Visually impairing combined cataract, left    POSTOPERATIVE DIAGNOSES:   1. Primary open angle glaucoma, uncontrolled, left  2. Visually impairing combined cataract, left    OPERATION: I Stent insertion with Kelman phacoemulsification and intraocular lens implant. Procedure(s):   I STENT / PHACO WITH IOL IMPLANT left   ANESTHESIA: Topical with intravenous sedation    TYPE OF LENS IMPLANT USED:   Implant Name Type Inv. Item Serial No.  Lot No. LRB No. Used Action   LENS IOL POST 1-PC 6X13 15.0 -- [employer] - [id number]  LENS IOL POST 1-PC 6X13 15.0 -- [employer] [id number] [employer] NA Left 1 Implanted   STENT EYE TRABECLAR BE INVAS -- ISTENT - SNA   STENT EYE TRABECLAR BE INVAS -- ISTENT NA Rdio [id number] Left 1 Implanted       PHACO TIME: 43 seconds at an average power of 16.6%. Estimated blood loss: None   Complications: None   Specimen removed: None     DESCRIPTION OF PROCEDURE: The patient was brought to the holding area where an IV was begun at a keep open rate. The patient received several instillations of Cyclogyl 2% and Mathew-Synephrine 2.5% and proparacaine 0.5% until best pupillary dilatation was achieved. The patient was connected to cardiovascular monitoring. The patient was then brought back to the operating suite and then prepped and draped in the usual manner for ocular surgery. Cardiovascular monitoring was reestablished. The patient received several instillations of Betadine in the inferior conjunctival cul-de-sac along with 4% Xylocaine. The operating microscope was brought into position and the lid speculum was set into position. Two drops of 4% xylocaine were applied to the patient's cornea. One pair of limbal relaxxing incisions were placed straddling the axis @ 97 degrees using gabriele blade set to 60% depth.  A paracentesis was then created and 1% preservative-free Xylocaine was instilled into the anterior chamber and viscoelastic was instilled into the anterior chamber. The 2.4mm keratome was utilized to create an incision. The capsulorrhexsis was performed in a circular fashion. The hard nucleus of the lens was hydrodissected away from the capsule using BSS solution. viscoelastic was then instilled into the anterior chamber to protect the corneal endothelium. Phacoemulsification was then carried out followed by irrigation and aspiration. Following this, the sterile lens was removed from the sterile container and inserted into the lens injector. It was inserted into the eye without complications and positioned appropriately on the visual /astigmatic axis . The Viscoelastic was then removed from the posterior chamber as well as the anterior chamber of the eye, and Miochol was instilled posterior to the iris plane to facilitate pupillary miosis. Viscoelastic was then reinstilled into the anterior chamber to facilitate visualization of the angle. The microscope was angled to 45 degrees and the patient's head positioned to maximize visibility of the angle. The gonioprism was coupled to the cornea with viscoelastic. The I-stent was carefully inserted into schlemm's canal and checked to be sure it was securely positioned. Once positioning was confirmed the remaining viscoelastic was removed from the eye. The incision site was checked for any leaks. After finding none, the patient received several instillations of Iopidine, pred forte and then followed by instillation of gentamycin ophthalmic ointment. The lid speculum was removed and the patient was patched with a semi-pressure dressing and shield and was brought to the recovery room in alert and stable and satisfactory postoperative condition. Instructions were given to the patients family for their immediate postoperative care, and the patient is to follow up with me in the office tomorrow.       [address] [place] [address],   6/7/2017

## 2017-06-07 NOTE — ANESTHESIA POSTPROCEDURE EVALUATION
Post-Anesthesia Evaluation and Assessment    Patient: Axel Johansen MRN: [id number]  SSN: [id number]    YOB: 1950  Age: 73 y.o. Sex: female       Cardiovascular Function/Vital Signs  Visit Vitals    /85 (BP 1 Location: Left arm, BP Patient Position: Head of bed elevated (Comment degrees))    Pulse (!) 53    Temp 36.8 °C (98.3 °F)    Resp 13    Ht 5' 4\" (1.626 m)    Wt 74.5 kg (164 lb 3.2 oz)    SpO2 97%    BMI 28.18 kg/m2       Patient is status post MAC anesthesia for Procedure(s):  CATARACT EXTRACTION WITH INTRA OCULAR LENS IMPLANT LEFT EYE, I STENT. Nausea/Vomiting: None    Postoperative hydration reviewed and adequate. Pain:  Pain Scale 1: Numeric (0 - 10) (06/07/17 1016)  Pain Intensity 1: 0 (06/07/17 1016)   Managed    Neurological Status:   Neuro (WDL): Within Defined Limits (06/07/17 1009)  Neuro  LUE Motor Response: Purposeful (06/07/17 1009)  LLE Motor Response: Purposeful (06/07/17 1009)  RUE Motor Response: Purposeful (06/07/17 1009)  RLE Motor Response: Purposeful (06/07/17 1009)   At baseline    Mental Status and Level of Consciousness: Arousable    Pulmonary Status:   O2 Device: Room air (06/07/17 1016)   Adequate oxygenation and airway patent    Complications related to anesthesia: None    Post-anesthesia assessment completed.  No concerns    Signed By: Elizabeth Stafford MD     June 7, 2017

## 2017-06-07 NOTE — PERIOP NOTES
Teresita Chinchilla  1950  [id number]    Situation:  Verbal report given from: Andrés Nunez CRNA & Kain Henry RN  Procedure: Procedure(s):  CATARACT EXTRACTION WITH INTRA OCULAR LENS IMPLANT LEFT EYE, I STENT    Background:    Preoperative diagnosis: CATARACT LEFT EYE    Postoperative diagnosis: CATARACT LEFT EYE    :  Dr. Petrona Knox    Assistant(s): Circ-1: Byron Juarez RN  Scrub Tech-1: Melody Sanchez    Specimens: * No specimens in log *    Assessment:  Intra-procedure medications         Anesthesia gave intra-procedure sedation and medications, see anesthesia flow sheet     Intravenous fluids: NS@ KVO     Vital signs stable       Recommendation:    Permission to share finding with family or friend yes

## 2017-06-07 NOTE — DISCHARGE INSTRUCTIONS
Klaus Ariza D.O., P.CLaurie  [facility] [address].  [phone]    Post-Operative Instructions for I Stent - Cataract Surgery     Remove your eye shield and bandage at 12 noon the same day as surgery and start your eye drops. THROW AWAY THE GAUZE UNDER THE SHIELD.  Place the blue eye shield back on for one week while asleep. Use the tape included in your bag.  DO NOT RUB YOUR EYE EVER! DO NOT WEAR EYE MAKEUP FOR 1 WEEK!  You may take a bath today and you can shower starting tomorrow.  You may resume your previous diet.  If you use glaucoma drops in the operative eye, stop using them. May continue in non-operative eye as directed by Dr Hoda Domínguez.  Common symptoms after surgery include a scratchy feeling, slight headache, red eye, and/or blurred vision. You may use Advil or Tylenol for any discomfort.  Avoid strenuous activities and driving until you see Dr. Hoda Domínguez tomorrow. Please use care when walking, your depth perception may be altered.  Bring your bag with your drops to your follow up appointment. Below are Instructions On How To Use Your Eye Drops. ON THE DAY OF SURGERY:     Use all three eye drops at 12 noon, 4pm, and 8pm.  Wait 10 minutes between drops. THE DAY AFTER SURGERY:     You will use the drops 4 times a day at 8am, 12 noon, 4pm, and 8pm.   Use the drops every day until bottles are empty. Vigamox (tan top) Put 1 drop in at 8am, 12 noon, 4pm, and 8pm.    Pred Acetate (pink top) Put 1 drop in at 8:10am, 12:10pm, 4:10pm, and 8:10pm. Shake before using. Diclofenac Put 1 drop in at 8:20am, 12:20pm, 4:20pm, 8:20pm.    CONTINUE DROPS UNTIL ALL BOTTLES ARE EMPTY! Follow-up appointment tomorrow at Dr. Daniela Preciado office:____________________    Please call the office at [phone] if you experience severe pain or nausea.        DISCHARGE SUMMARY from Nurse    The following personal items collected during your admission are returned to you:   Dental Appliance: Dental Appliances: None  Vision:    Hearing Aid:    Jewelry: Jewelry: Ring (with patient)  Clothing: Clothing: None  Other Valuables: Other Valuables: None  Valuables sent to safe:        PATIENT INSTRUCTIONS:    After general anesthesia or intravenous sedation, for 24 hours or while taking prescription Narcotics:  · Someone should be with you for the next 24 hours. · For your own safety, a responsible adult must drive you home. · Limit your activities  · Recommended activity: Rest today and no driving until after your appointment with Dr. Tarik Grey tomorrow. · Do not drive and operate hazardous machinery  · Do not make important personal or business decisions  · Do  not drink alcoholic beverages  · If you have not urinated within 8 hours after discharge, please contact your surgeon on call. Report the following to your surgeon:  · Excessive pain, swelling, redness or odor of or around the surgical area  · Temperature over 100.5  · Any nausea and vomiting   ·   · You will receive a Post Operative Call from one of the Recovery Room Nurses on the day after your surgery to check on you. It is very important for us to know how you are recovering after your surgery. ·   · You may receive an e-mail or letter in the mail from [place] regarding your experience with us in the Ambulatory Surgery Unit. Your feedback is valuable to us and we appreciate your participation in the survey. ·     ·   · If the above instructions are not adequate, please contact Joy Lopez RN, Melissa anesthesia Nurse Manager or our Anesthesiologist, at [phone]. ·   · We wish you a speedy recovery ? What to do at Home:      *  Please give a list of your current medications to your Primary Care Provider. *  Please update this list whenever your medications are discontinued, doses are      changed, or new medications (including over-the-counter products) are added.     *  Please carry medication information at all times in case of emergency situations. These are general instructions for a healthy lifestyle:    No smoking/ No tobacco products/ Avoid exposure to second hand smoke    Surgeon General's Warning:  Quitting smoking now greatly reduces serious risk to your health. Obesity, smoking, and sedentary lifestyle greatly increases your risk for illness    A healthy diet, regular physical exercise & weight monitoring are important for maintaining a healthy lifestyle    You may be retaining fluid if you have a history of heart failure or if you experience any of the following symptoms:  Weight gain of 3 pounds or more overnight or 5 pounds in a week, increased swelling in our hands or feet or shortness of breath while lying flat in bed. Please call your doctor as soon as you notice any of these symptoms; do not wait until your next office visit. Recognize signs and symptoms of STROKE:    F-face looks uneven    A-arms unable to move or move even    S-speech slurred or non-existent    T-time-call 911 as soon as signs and symptoms begin-DO NOT go       Back to bed or wait to see if you get better-TIME IS BRAIN. If you have not had your influenza or pneumococcal vaccines, please follow up with your primary care physician. The discharge information has been reviewed with the patient and caregiver. The patient and caregiver verbalized understanding.

## 2017-06-07 NOTE — ANESTHESIA PREPROCEDURE EVALUATION
Anesthetic History   No history of anesthetic complications            Review of Systems / Medical History  Patient summary reviewed, nursing notes reviewed and pertinent labs reviewed    Pulmonary        Sleep apnea: CPAP           Neuro/Psych   Within defined limits           Cardiovascular    Hypertension          Hyperlipidemia    Exercise tolerance: >4 METS  Comments: Hx Bradycardia      GI/Hepatic/Renal     GERD          Comments: Hx Teague's esophagus  Endo/Other             Other Findings   Comments:     Vertigo   Osteopenia   glaucoma            Physical Exam    Airway  Mallampati: II    Neck ROM: normal range of motion   Mouth opening: Normal     Cardiovascular  Regular rate and rhythm,  S1 and S2 normal,  no murmur, click, rub, or gallop  Rhythm: regular  Rate: normal      Pertinent negatives: No murmur   Dental    Dentition: Caps/crowns     Pulmonary  Breath sounds clear to auscultation               Abdominal  GI exam deferred       Other Findings            Anesthetic Plan    ASA: 2  Anesthesia type: MAC          Induction: Intravenous  Anesthetic plan and risks discussed with: Patient

## 2017-09-20 DIAGNOSIS — R11.0 NAUSEA: ICD-10-CM

## 2017-09-20 RX ORDER — OMEPRAZOLE 20 MG/1
CAPSULE, DELAYED RELEASE ORAL
Qty: 60 CAP | Refills: 1 | Status: SHIPPED | OUTPATIENT
Start: 2017-09-20 | End: 2017-10-27 | Stop reason: SDUPTHER

## 2017-10-27 ENCOUNTER — OFFICE VISIT (OUTPATIENT)
Dept: INTERNAL MEDICINE CLINIC | Age: 67
End: 2017-10-27

## 2017-10-27 VITALS
HEART RATE: 66 BPM | TEMPERATURE: 97.8 F | RESPIRATION RATE: 18 BRPM | HEIGHT: 64 IN | WEIGHT: 170.8 LBS | SYSTOLIC BLOOD PRESSURE: 128 MMHG | BODY MASS INDEX: 29.16 KG/M2 | DIASTOLIC BLOOD PRESSURE: 80 MMHG | OXYGEN SATURATION: 99 %

## 2017-10-27 DIAGNOSIS — I10 ESSENTIAL HYPERTENSION: ICD-10-CM

## 2017-10-27 DIAGNOSIS — G47.33 OSA (OBSTRUCTIVE SLEEP APNEA): ICD-10-CM

## 2017-10-27 DIAGNOSIS — G89.29 CHRONIC MIDLINE LOW BACK PAIN WITHOUT SCIATICA: ICD-10-CM

## 2017-10-27 DIAGNOSIS — K22.719 BARRETT'S ESOPHAGUS WITH DYSPLASIA: ICD-10-CM

## 2017-10-27 DIAGNOSIS — R11.0 NAUSEA: ICD-10-CM

## 2017-10-27 DIAGNOSIS — Z23 ENCOUNTER FOR IMMUNIZATION: ICD-10-CM

## 2017-10-27 DIAGNOSIS — E78.00 HIGH CHOLESTEROL: Primary | ICD-10-CM

## 2017-10-27 DIAGNOSIS — Z00.00 MEDICARE ANNUAL WELLNESS VISIT, SUBSEQUENT: ICD-10-CM

## 2017-10-27 DIAGNOSIS — M54.50 CHRONIC MIDLINE LOW BACK PAIN WITHOUT SCIATICA: ICD-10-CM

## 2017-10-27 DIAGNOSIS — F51.01 PRIMARY INSOMNIA: ICD-10-CM

## 2017-10-27 DIAGNOSIS — J45.20 MILD INTERMITTENT REACTIVE AIRWAY DISEASE WITHOUT COMPLICATION: ICD-10-CM

## 2017-10-27 RX ORDER — ALBUTEROL SULFATE 90 UG/1
1 AEROSOL, METERED RESPIRATORY (INHALATION)
Qty: 1 INHALER | Refills: 3 | Status: SHIPPED | OUTPATIENT
Start: 2017-10-27 | End: 2020-03-24 | Stop reason: SDUPTHER

## 2017-10-27 RX ORDER — TRAMADOL HYDROCHLORIDE 50 MG/1
50 TABLET ORAL
Qty: 30 TAB | Refills: 1 | Status: SHIPPED | OUTPATIENT
Start: 2017-10-27 | End: 2018-02-27 | Stop reason: SDUPTHER

## 2017-10-27 RX ORDER — ROSUVASTATIN CALCIUM 10 MG/1
10 TABLET, COATED ORAL
Qty: 90 TAB | Refills: 3 | Status: SHIPPED | OUTPATIENT
Start: 2017-10-27 | End: 2018-11-08 | Stop reason: SDUPTHER

## 2017-10-27 RX ORDER — VALSARTAN AND HYDROCHLOROTHIAZIDE 320; 25 MG/1; MG/1
1 TABLET, FILM COATED ORAL DAILY
Qty: 90 TAB | Refills: 3 | Status: SHIPPED | OUTPATIENT
Start: 2017-10-27 | End: 2018-08-31 | Stop reason: ALTCHOICE

## 2017-10-27 RX ORDER — OMEPRAZOLE 20 MG/1
20 CAPSULE, DELAYED RELEASE ORAL DAILY
Qty: 90 CAP | Refills: 3 | Status: SHIPPED | OUTPATIENT
Start: 2017-10-27 | End: 2018-02-27 | Stop reason: SDUPTHER

## 2017-10-27 NOTE — PROGRESS NOTES
This is a Subsequent Medicare Annual Wellness Exam (AWV) (Performed 12 months after IPPE or effective date of Medicare Part B enrollment)  21-year-old woman with a history of possible Teague's esophagus, Pre-diabetes, essential hypertension, chronic back pain presenting for medicare wellness     On review of sstems complains of dizziness and sinus discomfort for X1 day. Using zyrted and flonase daily and mucinex and yesterday felt dizziness and pain around eyes which is typically related to sinus  Advised that it is too early to tell if infection and antibiotics not advised    Osteopenia: on calcium and vitamin D, exercising,weight bearing  Due for dexa in 10/2018    Teague's esophagus: follows with  Dr Bolanos/ sree. On PPI PRN needs refill    Needs refill of BP medication and cholesterol also    Taking ultram PRN for back pain last filled in [place] and requesting refill. Only uses if has severe back pain after \" over use\"       Insomnia: tried [place] and elavil in the past. Would like something different. Does not need daily medication for sleep but at times has difficulty falling asleep        I have reviewed the patient's medical history in detail and updated the computerized patient record.      History     Past Medical History:   Diagnosis Date    Teague esophagus     small segment in 2014 Dr Cheo Thomas Bradycardia     had negative stress test and nuclear imaging done in 2015    GERD (gastroesophageal reflux disease)     Glaucoma     H/O seasonal allergies     High cholesterol     Hypertension     NICOLÁS on CPAP     Osteopenia     Vertigo       Past Surgical History:   Procedure Laterality Date    HX APPENDECTOMY      with jose    HX BUNIONECTOMY Bilateral     HX HEENT      right cataract removed    HX HYSTERECTOMY      HX OPEN CHOLECYSTECTOMY      with appy     Current Outpatient Prescriptions   Medication Sig Dispense Refill    rosuvastatin (CRESTOR) 10 mg tablet Take 1 Tab by mouth nightly. 90 Tab 3    valsartan-hydroCHLOROthiazide (DIOVAN-HCT) 320-25 mg per tablet Take 1 Tab by mouth daily. 90 Tab 3    omeprazole (PRILOSEC) 20 mg capsule Take 1 Cap by mouth daily. 90 Cap 3    albuterol (PROVENTIL HFA, VENTOLIN HFA, PROAIR HFA) 90 mcg/actuation inhaler Take 1 Puff by inhalation every four (4) hours as needed for Wheezing. 1 Inhaler 3    cpap machine kit by Does Not Apply route. 1 Kit 0    traMADol (ULTRAM) 50 mg tablet Take 1 Tab by mouth every twelve (12) hours as needed for Pain. Max Daily Amount: 100 mg. 30 Tab 1    suvorexant (BELSOMRA) 10 mg tablet Take 1 Tab by mouth nightly as needed for Insomnia. Max Daily Amount: 10 mg. 30 Tab 0    moxifloxacin (VIGAMOX) 0.5 % ophthalmic solution Administer 1 Drop to left eye four (4) times daily.  guaiFENesin ER (MUCINEX) 600 mg ER tablet Take 600 mg by mouth two (2) times a day.  fluticasone (FLONASE) 50 mcg/actuation nasal spray 2 Sprays by Both Nostrils route daily.  VIGAMOX 0.5 % ophthalmic solution Administer 1 Drop to right eye four (4) times daily.  desonide (TRIDESILON) 0.05 % cream Apply  to affected area as needed.  timolol (TIMOPTIC) 0.5 % ophthalmic solution Administer 1 Drop to both eyes daily.  aspirin 81 mg chewable tablet Take 81 mg by mouth daily.  omega-3 fatty acids-vitamin e (FISH OIL) 1,000 mg cap Take 3 Caps by mouth daily.  calcium carbonate (OS-VJ) 500 mg calcium (1,250 mg) tablet Take 1 Tab by mouth daily.        Allergies   Allergen Reactions    Latex Rash    Codeine Nausea and Vomiting    Thiuram Analogues Rash     Family History   Problem Relation Age of Onset    Colon Cancer Mother 76    Coronary Artery Disease Father 61     Social History   Substance Use Topics    Smoking status: Never Smoker    Smokeless tobacco: Never Used    Alcohol use 0.6 oz/week     1 Glasses of wine per week     Patient Active Problem List   Diagnosis Code    Chest pain R07.9    Hypertension I10    High cholesterol E78.00    Teague esophagus K22.70    Osteopenia M85.80    Sleep apnea G47.30    Primary open angle glaucoma of right eye, mild stage H40.1111    Combined forms of age-related cataract of right eye H25.811    Primary open angle glaucoma of left eye, mild stage H40.1121    Combined forms of age-related cataract of left eye H25.812       Depression Risk Factor Screening:     PHQ over the last two weeks 10/27/2017   Little interest or pleasure in doing things Not at all   Feeling down, depressed or hopeless Not at all   Total Score PHQ 2 0     Alcohol Risk Factor Screening: You do not drink alcohol or very rarely. Functional Ability and Level of Safety:   Hearing Loss  Hearing is good. Activities of Daily Living  The home contains: no safety equipment. Patient does total self care    Fall Risk  Fall Risk Assessment, last 12 mths 10/27/2017   Able to walk? Yes   Fall in past 12 months? No       Abuse Screen  Patient is not abused    Cognitive Screening   Evaluation of Cognitive Function:  Has your family/caregiver stated any concerns about your memory: no  Normal    Patient Care Team   Patient Care Team:  Nneka Sandoval MD as PCP - General (Internal Medicine)  Luly Salter MD as Consulting Provider (Gastroenterology)    Assessment/Plan   Education and counseling provided:  Are appropriate based on today's review and evaluation    Diagnoses and all orders for this visit:    1. High cholesterol  -     rosuvastatin (CRESTOR) 10 mg tablet; Take 1 Tab by mouth nightly. - retunr for fasting labs at follow up visit   2. Medicare annual wellness visit, subsequent    3. Encounter for immunization  -     Influenza Admin ()  -     Influenza virus vaccine (FLUZONE HIGH DOSE) PF (74576)  -     PNEUMOCOCCAL CONJ VACCINE 13 VALENT IM  -     [place] [address] ADDL    4. Essential hypertension: stable BP. Labs done in May.  Repeat early next year  - valsartan-hydroCHLOROthiazide (DIOVAN-HCT) 320-25 mg per tablet; Take 1 Tab by mouth daily. 5. Teague's esophagus with dysplasia  -     omeprazole (PRILOSEC) 20 mg capsule; Take 1 Cap by mouth daily. 6. Mild intermittent reactive airway disease without complication  -     albuterol (PROVENTIL HFA, VENTOLIN HFA, PROAIR HFA) 90 mcg/actuation inhaler; Take 1 Puff by inhalation every four (4) hours as needed for Wheezing. 7. NICOLÁS (obstructive sleep apnea): uses CPAP and needs new machine  -     cpap machine kit; by Does Not Apply route. 8. Chronic midline low back pain without sciatica: rarely uses tramadol last filled in April.   -     traMADol (ULTRAM) 50 mg tablet; Take 1 Tab by mouth every twelve (12) hours as needed for Pain. Max Daily Amount: 100 mg.    9. Primary insomnia: failed anbiem and elavil in the past  -     suvorexant (BELSOMRA) 10 mg tablet; Take 1 Tab by mouth nightly as needed for Insomnia. Max Daily Amount: 10 mg.         Health Maintenance Due   Topic Date Due    ZOSTER VACCINE AGE 60>  05/31/2011    Pneumococcal 65+ Low/Medium Risk (1 of 2 - PCV13) 07/31/2016    INFLUENZA AGE 9 TO ADULT  08/01/2017

## 2017-10-27 NOTE — PATIENT INSTRUCTIONS

## 2017-10-30 PROBLEM — H26.491 RIGHT POSTERIOR CAPSULAR OPACIFICATION: Status: ACTIVE | Noted: 2017-10-30

## 2017-10-30 NOTE — PERIOP NOTES
[facility]  Ambulatory Surgery Unit  Pre-operative Instructions    Procedure Date  11/1/17            Tentative Arrival Time 0630      1. On the day of your procedure, please report to the Ambulatory Surgery Unit Registration Desk and sign in at your designated time. The Ambulatory Surgery Unit is located in [facility] on the [facility] side of the [facility] across from the [address] [place] [address]. Please have all of your health insurance cards and a photo ID. 2. You must have someone with you to drive you home as directed by your surgeon. 3. You may have a light breakfast and take normal morning medications. 4. We recommend you do not drink any alcoholic beverages for 24 hours before and after your procedure. 5. Contact your surgeons office for instructions on the following medications: non-steroidal anti-inflammatory drugs (i.e. Advil, Aleve), vitamins, and supplements. (Some surgeons will want you to stop these medications prior to surgery and others may allow you to take them)   **If you are currently taking Plavix, Coumadin, Aspirin and/or other blood-thinning agents, contact your surgeon for instructions. ** Your surgeon will partner with the physician prescribing these medications to determine if it is safe to stop or if you need to continue taking. Please do not stop taking these medications without instructions from your surgeon. 6. In an effort to help prevent surgical site infection, we ask that you shower with an anti-bacterial soap (i.e. Dial or Safeguard) on the morning of your procedure. Do not apply any lotions, powders, or deodorants after showering. 7. Wear comfortable clothes. Wear glasses instead of contacts. Do not bring any jewelry or money (other than copays or fees as instructed). Do not wear make-up, particularly mascara, the morning of your procedure. Wear your hair loose or down, no ponytails, buns, pratik pins or clips.  All body piercings must be removed. 8. You should understand that if you do not follow these instructions your procedure may be cancelled. If your physical condition changes (i.e. fever, cold or flu) please contact your surgeon as soon as possible. 9. It is important that you be on time. If a situation occurs where you may be late, or if you have any questions or problems, please call [phone].    10. Your procedure time may be subject to change. You will receive a phone call the day prior to confirm your arrival time. I understand a pre-operative phone call will be made to verify my procedure time. In the event that I am not available, I give permission for a message to be left on my answering service and/or with another person?       Yes     (instructions given verbally during phone assessment- pt voiced understanding)     ___________________      ___________________      ___________________  (Signature of Patient)          (Witness)                   (Date and Time)

## 2017-11-01 ENCOUNTER — HOSPITAL ENCOUNTER (OUTPATIENT)
Age: 67
Setting detail: OUTPATIENT SURGERY
Discharge: HOME | End: 2017-11-01
Attending: OPHTHALMOLOGY | Admitting: OPHTHALMOLOGY
Payer: MEDICARE

## 2017-11-01 VITALS
RESPIRATION RATE: 17 BRPM | TEMPERATURE: 99 F | WEIGHT: 169 LBS | OXYGEN SATURATION: 95 % | SYSTOLIC BLOOD PRESSURE: 118 MMHG | HEART RATE: 61 BPM | HEIGHT: 64 IN | DIASTOLIC BLOOD PRESSURE: 70 MMHG | BODY MASS INDEX: 28.85 KG/M2

## 2017-11-01 PROCEDURE — 74011000250 HC RX REV CODE- 250

## 2017-11-01 PROCEDURE — 76030000017 HC AMB SURG FIRST 0.5 HR INTENSV-TIER 1: Performed by: OPHTHALMOLOGY

## 2017-11-01 PROCEDURE — 74011000250 HC RX REV CODE- 250: Performed by: OPHTHALMOLOGY

## 2017-11-01 RX ORDER — TROPICAMIDE 10 MG/ML
SOLUTION/ DROPS OPHTHALMIC
Status: COMPLETED
Start: 2017-11-01 | End: 2017-11-01

## 2017-11-01 RX ORDER — TROPICAMIDE 10 MG/ML
1 SOLUTION/ DROPS OPHTHALMIC
Status: DISCONTINUED | OUTPATIENT
Start: 2017-11-01 | End: 2017-11-01 | Stop reason: HOSPADM

## 2017-11-01 RX ORDER — PHENYLEPHRINE HYDROCHLORIDE 25 MG/ML
1 SOLUTION/ DROPS OPHTHALMIC
Status: DISCONTINUED | OUTPATIENT
Start: 2017-11-01 | End: 2017-11-01 | Stop reason: HOSPADM

## 2017-11-01 RX ORDER — PROPARACAINE HYDROCHLORIDE 5 MG/ML
1 SOLUTION/ DROPS OPHTHALMIC
Status: COMPLETED | OUTPATIENT
Start: 2017-11-01 | End: 2017-11-01

## 2017-11-01 RX ADMIN — HYPROMELLOSE 2910 (4000 MPA.S) 1 DROP: 25 SOLUTION/ DROPS OPHTHALMIC at 07:40

## 2017-11-01 RX ADMIN — PHENYLEPHRINE HYDROCHLORIDE 1 DROP: 25 SOLUTION/ DROPS OPHTHALMIC at 06:52

## 2017-11-01 RX ADMIN — PROPARACAINE HYDROCHLORIDE 1 DROP: 5 SOLUTION/ DROPS OPHTHALMIC at 06:52

## 2017-11-01 RX ADMIN — TROPICAMIDE 1 DROP: 10 SOLUTION/ DROPS OPHTHALMIC at 06:52

## 2017-11-01 RX ADMIN — PROPARACAINE HYDROCHLORIDE 1 DROP: 5 SOLUTION/ DROPS OPHTHALMIC at 06:59

## 2017-11-01 RX ADMIN — BALANCED SALT SOLUTION 20 DROP: 6.4; .75; .48; .3; 3.9; 1.7 SOLUTION OPHTHALMIC at 07:42

## 2017-11-01 RX ADMIN — PROPARACAINE HYDROCHLORIDE 1 DROP: 5 SOLUTION/ DROPS OPHTHALMIC at 07:39

## 2017-11-01 RX ADMIN — TROPICAMIDE 1 DROP: 10 SOLUTION/ DROPS OPHTHALMIC at 06:43

## 2017-11-01 RX ADMIN — TROPICAMIDE 1 DROP: 10 SOLUTION/ DROPS OPHTHALMIC at 06:59

## 2017-11-01 RX ADMIN — PHENYLEPHRINE HYDROCHLORIDE 1 DROP: 25 SOLUTION/ DROPS OPHTHALMIC at 06:59

## 2017-11-01 RX ADMIN — PHENYLEPHRINE HYDROCHLORIDE 1 DROP: 25 SOLUTION/ DROPS OPHTHALMIC at 06:43

## 2017-11-01 RX ADMIN — FLUOROMETHOLONE 1 DROP: 2.5 SUSPENSION/ DROPS OPHTHALMIC at 07:43

## 2017-11-01 RX ADMIN — PROPARACAINE HYDROCHLORIDE 1 DROP: 5 SOLUTION/ DROPS OPHTHALMIC at 06:43

## 2017-11-01 NOTE — OP NOTES
Name:  Loy Wray MASC-2       updated  3/1/13     Description:  YAG laser capsulotomy      PREOPERATIVE DIAGNOSIS: Visually impairing posterior capsular opacification Right eye    POSTOPERATIVE DIAGNOSIS: Same    OPERATION: YAG laser capsulotomy,Righteye. ANESTHESIA: Topical.    LASER PARAMETERS:  Power:  2.1 millijoules per shot. Total shots delivered:  28    Estimated blood loss:None  Complications:None  Specimen removed: None    DESCRIPTION OF PROCEDURE: The patient was brought to the holding area where she received several instillations of Mydriacyl 1%, Mathew-Synephrine 2.5%, and tetracaine until adequate pupillary dilatation was achieved. The patient's vital signs were recorded. The patient was then brought to the laser suite and received 1 drop of tetracaine preoperatively. The patient was then seated at the laser and the Moe capsulotomy lens was placed on the eye. The patient's posterior capsular opacification was then cleared utilizing the laser. Following this the eye was rinsed with BSS solution to remove the Goniosol solution from the surface of the eye, and the patient received 1 drop of fluorometholone drops in the operated eye. Iopidine was used at the discretion of the surgeon depending on the amount of energy necessary to clear the opacified capsule. Instructions were given to the patient verbally and written to use her FML drops 3 times a day at 9 a.m., 3 p.m., and 9 p.m. for the next 3 days. The patient will be following up with us postoperatively in the office.     [address] [place] [address]  11/1/2017

## 2017-11-14 ENCOUNTER — TELEPHONE (OUTPATIENT)
Dept: INTERNAL MEDICINE CLINIC | Age: 67
End: 2017-11-14

## 2017-11-14 DIAGNOSIS — J01.01 ACUTE RECURRENT MAXILLARY SINUSITIS: Primary | ICD-10-CM

## 2017-11-14 RX ORDER — AMOXICILLIN AND CLAVULANATE POTASSIUM 875; 125 MG/1; MG/1
1 TABLET, FILM COATED ORAL 2 TIMES DAILY
Qty: 20 TAB | Refills: 0 | Status: SHIPPED | OUTPATIENT
Start: 2017-11-14 | End: 2018-02-27 | Stop reason: ALTCHOICE

## 2017-11-14 NOTE — TELEPHONE ENCOUNTER
Patient was seen on 10/27/17 and complaint of dizziness and sinus congestion. She has been using zyrtec and flonase with no relief. Patient states her symptoms have gotten worse. She is requesting an antibiotic be called to her pharmacy. Please advise.

## 2017-11-14 NOTE — TELEPHONE ENCOUNTER
#[phone] pt states it has been two week and she is not better with the sinus inf. Pt states she feels bad. Pt is requesting antibiotic to be called into [facility] on file. Please call when this has been sent to the pharm.

## 2018-02-27 ENCOUNTER — OFFICE VISIT (OUTPATIENT)
Dept: INTERNAL MEDICINE CLINIC | Age: 68
End: 2018-02-27

## 2018-02-27 VITALS
HEART RATE: 62 BPM | BODY MASS INDEX: 28.56 KG/M2 | OXYGEN SATURATION: 99 % | WEIGHT: 167.3 LBS | DIASTOLIC BLOOD PRESSURE: 75 MMHG | HEIGHT: 64 IN | TEMPERATURE: 97.8 F | SYSTOLIC BLOOD PRESSURE: 119 MMHG | RESPIRATION RATE: 18 BRPM

## 2018-02-27 DIAGNOSIS — G47.30 SLEEP APNEA, UNSPECIFIED TYPE: ICD-10-CM

## 2018-02-27 DIAGNOSIS — I10 ESSENTIAL HYPERTENSION: Primary | ICD-10-CM

## 2018-02-27 DIAGNOSIS — E78.00 HIGH CHOLESTEROL: ICD-10-CM

## 2018-02-27 DIAGNOSIS — G89.29 CHRONIC MIDLINE LOW BACK PAIN WITHOUT SCIATICA: ICD-10-CM

## 2018-02-27 DIAGNOSIS — M54.50 CHRONIC MIDLINE LOW BACK PAIN WITHOUT SCIATICA: ICD-10-CM

## 2018-02-27 DIAGNOSIS — R11.0 NAUSEA: ICD-10-CM

## 2018-02-27 DIAGNOSIS — M85.80 OSTEOPENIA, UNSPECIFIED LOCATION: ICD-10-CM

## 2018-02-27 DIAGNOSIS — K22.719 BARRETT'S ESOPHAGUS WITH DYSPLASIA: ICD-10-CM

## 2018-02-27 RX ORDER — OMEPRAZOLE 20 MG/1
20 CAPSULE, DELAYED RELEASE ORAL DAILY
Qty: 90 CAP | Refills: 3 | Status: SHIPPED | OUTPATIENT
Start: 2018-02-27 | End: 2018-11-08 | Stop reason: SDUPTHER

## 2018-02-27 RX ORDER — TRAMADOL HYDROCHLORIDE 50 MG/1
50 TABLET ORAL
Qty: 30 TAB | Refills: 1 | Status: SHIPPED | OUTPATIENT
Start: 2018-02-27 | End: 2018-08-31 | Stop reason: SDUPTHER

## 2018-02-27 NOTE — PATIENT INSTRUCTIONS
Blood pressure is excellent    If Nausea persists schedule appointment with gastroenterologist to have a possible repeat endoscopy

## 2018-02-27 NOTE — PROGRESS NOTES
CC: Hypertension (follow up)      HPI:    She is a 78 y.o. female who presents for follow up on chronic medical problems      Osteopenia: on calcium and vitamin D, exercising in yard,weight bearing  Due for dexa in 10/2018    Teague's esophagus: follows with  Dr Bolanos/ sree. On PPI PRN   Does complain of intermittent nausea and plans to follow up with Dr Marizol Oneal    HTN: BP is excellent currently on valsartan- HCTZ    Taking ultram PRN for back pain last filled in April and requesting refill. Only uses if has severe back pain after \" over use\"       Insomnia: tried Cecy Kemps and elavil in the past. Last visit patient prescribed belsomra and works but cost is an issue. On amoxicillin for a sinus infection \" feels significantly better\"     Patient was seen by dermatologist this year    ROS:  10 systems reviewed and negative other than HPI     Past Medical History:   Diagnosis Date    Teague esophagus     small segment in 2014 Dr Kevin Gresham Bradycardia     had negative stress test and nuclear imaging done in 2015    GERD (gastroesophageal reflux disease)     Glaucoma     H/O seasonal allergies     High cholesterol     Hypertension     NICOLÁS on CPAP     Osteopenia     Vertigo        Current Outpatient Prescriptions on File Prior to Visit   Medication Sig Dispense Refill    rosuvastatin (CRESTOR) 10 mg tablet Take 1 Tab by mouth nightly. 90 Tab 3    valsartan-hydroCHLOROthiazide (DIOVAN-HCT) 320-25 mg per tablet Take 1 Tab by mouth daily. 90 Tab 3    albuterol (PROVENTIL HFA, VENTOLIN HFA, PROAIR HFA) 90 mcg/actuation inhaler Take 1 Puff by inhalation every four (4) hours as needed for Wheezing. 1 Inhaler 3    cpap machine kit by Does Not Apply route. 1 Kit 0    suvorexant (BELSOMRA) 10 mg tablet Take 1 Tab by mouth nightly as needed for Insomnia. Max Daily Amount: 10 mg. 30 Tab 0    guaiFENesin ER (MUCINEX) 600 mg ER tablet Take 600 mg by mouth daily as needed.       fluticasone (FLONASE) 50 mcg/actuation nasal spray 2 Sprays by Both Nostrils route daily.  aspirin 81 mg chewable tablet Take 81 mg by mouth daily.  omega-3 fatty acids-vitamin e (FISH OIL) 1,000 mg cap Take 3 Caps by mouth daily.  calcium carbonate (OS-VJ) 500 mg calcium (1,250 mg) tablet Take 1 Tab by mouth daily. No current facility-administered medications on file prior to visit. Past Surgical History:   Procedure Laterality Date    HX APPENDECTOMY      with jose    HX BUNIONECTOMY Bilateral     HX CATARACT REMOVAL Bilateral 2017    HX HYSTERECTOMY      HX OPEN CHOLECYSTECTOMY      with appy       Family History   Problem Relation Age of Onset    Colon Cancer Mother 76    Coronary Artery Disease Father 61     Reviewed and no changes     Social History     Social History    Marital status:      Spouse name: N/A    Number of children: N/A    Years of education: N/A     Occupational History    Not on file.      Social History Main Topics    Smoking status: Never Smoker    Smokeless tobacco: Never Used    Alcohol use 0.6 oz/week     1 Glasses of wine per week    Drug use: No    Sexual activity: Not Currently     Other Topics Concern    Not on file     Social History Narrative            Visit Vitals    /75 (BP 1 Location: Right arm, BP Patient Position: Sitting)    Pulse 62    Temp 97.8 °F (36.6 °C) (Oral)    Resp 18    Ht 5' 4\" (1.626 m)    Wt 167 lb 4.8 oz (75.9 kg)    SpO2 99%    BMI 28.72 kg/m2       Physical Examination:   General - Well appearing female  HEENT - PERRL, TM no erythema/opacification, normal nasal turbinates, oropharynx no erythema or exudate, MMM  Neck - supple, no bruits, no TMG, no LAD  Pulm - clear to auscultation bilaterally  Cardio - RRR, normal S1 S2, no murmur gallops or rubs  Abd - soft, nontender, no masses, no HSM  Extrem - no edema, +2 distal pulses  Psych - normal affect, appropriate mood    Lab Results   Component Value Date/Time    WBC 5.5 05/05/2017 09:20 AM    HGB 13.6 05/05/2017 09:20 AM    HCT 41.6 05/05/2017 09:20 AM    PLATELET 127 67/28/0510 09:20 AM    MCV 94 05/05/2017 09:20 AM     Lab Results   Component Value Date/Time    Sodium 141 05/05/2017 09:20 AM    Potassium 4.0 05/05/2017 09:20 AM    Chloride 100 05/05/2017 09:20 AM    CO2 26 05/05/2017 09:20 AM    Anion gap 8 10/26/2015 06:31 PM    Glucose 95 05/05/2017 09:20 AM    BUN 21 05/05/2017 09:20 AM    Creatinine 0.62 05/05/2017 09:20 AM    BUN/Creatinine ratio 34 (H) 05/05/2017 09:20 AM    GFR est  05/05/2017 09:20 AM    GFR est non-AA 95 05/05/2017 09:20 AM    Calcium 9.7 05/05/2017 09:20 AM     Lab Results   Component Value Date/Time    Cholesterol, total 162 03/23/2017 11:06 AM    HDL Cholesterol 52 03/23/2017 11:06 AM    LDL, calculated 78 03/23/2017 11:06 AM    VLDL, calculated 32 03/23/2017 11:06 AM    Triglyceride 162 (H) 03/23/2017 11:06 AM     Lab Results   Component Value Date/Time    TSH 1.340 03/23/2017 11:06 AM     No results found for: PSA, PSA2, PSAR1, [place], PSAR3, [id number], [id number], PSALT  Lab Results   Component Value Date/Time    Hemoglobin A1c 6.1 (H) 03/23/2017 11:06 AM     Lab Results   Component Value Date/Time    VITAMIN D, 25-HYDROXY 61.2 03/23/2017 11:06 AM       Lab Results   Component Value Date/Time    ALT (SGPT) 22 05/05/2017 09:20 AM    AST (SGOT) 25 05/05/2017 09:20 AM    Alk. phosphatase 44 05/05/2017 09:20 AM    Bilirubin, direct 0.11 03/23/2017 11:06 AM    Bilirubin, total 0.3 05/05/2017 09:20 AM           Assessment/Plan:    1. Essential hypertension  - blood pressure is well controlled on valsartan HCTZ  - METABOLIC PANEL, COMPREHENSIVE  - HEMOGLOBIN A1C WITH EAG  - CBC WITH AUTOMATED DIFF    2. High cholesterol  - currently on crestor 10mg - check lipid panel today  - LIPID PANEL    3. Osteopenia, unspecified location  - discussed weight bearing exercise, on calcium and vitamin D    4.  Sleep apnea, unspecified type  - cpap machine kit; by Does Not Apply route. Pressure setting is 11 mm Hg  Diagnosis is NICOLÁS  Dispense: 1 Kit; Refill: 0    5. Nausea    6. Teague's esophagus   - omeprazole (PRILOSEC) 20 mg capsule; Take 1 Cap by mouth daily. Dispense: 90 Cap; Refill: 3  - patient advised to follow up with GI    7. Chronic midline low back pain without sciatica  - traMADol (ULTRAM) 50 mg tablet; Take 1 Tab by mouth every twelve (12) hours as needed for Pain. Max Daily Amount: 100 mg. Dispense: 30 Tab; Refill: 1    8. Mild intermittent reactive airway disease without complication  -     albuterol (PROVENTIL HFA, VENTOLIN HFA, PROAIR HFA) 90 mcg/actuation inhaler; Take 1 Puff by inhalation every four (4) hours as needed for Wheezing.         Follow-up Disposition:  Return in about 6 months (around 8/27/2018) for medicare yearly exam .    MD Abril

## 2018-02-27 NOTE — PROGRESS NOTES
Chief Complaint   Patient presents with    Hypertension     follow up     1. Have you been to the ER, urgent care clinic since your last visit? Hospitalized since your last visit? Yes When: 2/12/18 Where: [facility]  Reason for visit: Cold     2. Have you seen or consulted any other health care providers outside of the [address] [place] [address] since your last visit? Include any pap smears or colon screening.  No

## 2018-02-27 NOTE — MR AVS SNAPSHOT
[address] [facility] 83. 
[phone] Patient: Jamin Gutierrez MRN: [id number] TZI:[id number]/85/[id number] Visit Information Date & Time Provider Department Dept. Phone Encounter #  
 2/27/2018 10:00 AM Abr[facility] [address] [place] [address],[address] [phone] [id number] Follow-up Instructions Return in about 6 months (around 8/27/2018) for medicare yearly exam . Upcoming Health Maintenance Date Due ZOSTER VACCINE AGE 60> 5/31/2011 Pneumococcal 65+ Low/Medium Risk (2 of 2 - PPSV23) 10/27/2018 MEDICARE YEARLY EXAM 10/28/2018 GLAUCOMA SCREENING Q2Y 3/1/2019 BREAST CANCER SCRN MAMMOGRAM 4/14/2019 COLONOSCOPY 7/10/2025 DTaP/Tdap/Td series (2 - Td) 3/10/2026 Allergies as of 2/27/2018  Review Complete On: 2/27/2018 By: Lyndsey Thorne Severity Noted Reaction Type Reactions Latex  05/26/2017    Rash Codeine  10/26/2015    Nausea and Vomiting Thiuram Analogues  03/23/2017    Rash Current Immunizations  Reviewed on 10/27/2017 Name Date Influenza High Dose Vaccine PF 10/27/2017 11:40 AM  
 Influenza Vaccine (Quad) PF 10/27/2015  5:27 PM  
 Pneumococcal Conjugate (PCV-13) 10/27/2017 11:41 AM  
 Tdap 3/10/2016 Not reviewed this visit You Were Diagnosed With   
  
 Codes Comments Essential hypertension    -  Primary ICD-10-CM: I10 
ICD-9-CM: 401.9 High cholesterol     ICD-10-CM: E78.00 ICD-9-CM: 272.0 Osteopenia, unspecified location     ICD-10-CM: M85.80 ICD-9-CM: 733.90 Sleep apnea, unspecified type     ICD-10-CM: G47.30 ICD-9-CM: 780.57 Nausea     ICD-10-CM: R11.0 ICD-9-CM: 787.02 Teague's esophagus with dysplasia     ICD-10-CM: O95.532 ICD-9-CM: 530.85 Vitals BP Pulse Temp Resp Height(growth percentile) Weight(growth percentile)  119/75 (BP 1 Location: Right arm, BP Patient Position: Sitting) 62 97.8 °F (36.6 °C) (Oral) 18 5' 4\" (1.626 m) 167 lb 4.8 oz (75.9 kg) SpO2 BMI OB Status Smoking Status 99% 28.72 kg/m2 Hysterectomy Never Smoker BMI and BSA Data Body Mass Index Body Surface Area 28.72 kg/m 2 1.85 m 2 Preferred Pharmacy Pharmacy Name Phone [facility]. [phone] Your Updated Medication List  
  
   
This list is accurate as of 2/27/18 10:29 AM.  Always use your most recent med list.  
  
  
  
  
 albuterol 90 mcg/actuation inhaler Commonly known as:  PROVENTIL HFA, VENTOLIN HFA, PROAIR HFA Take 1 Puff by inhalation every four (4) hours as needed for Wheezing. aspirin 81 mg chewable tablet Take 81 mg by mouth daily. calcium carbonate 500 mg calcium (1,250 mg) tablet Commonly known as:  OS-VJ Take 1 Tab by mouth daily. cpap machine kit  
by Does Not Apply route. cpap machine kit  
by Does Not Apply route. Pressure setting is 11 mm Hg Diagnosis is NICOLÁS FISH OIL 1,000 mg Cap Generic drug:  omega-3 fatty acids-vitamin e Take 3 Caps by mouth daily. FLONASE 50 mcg/actuation nasal spray Generic drug:  fluticasone 2 Sprays by Both Nostrils route daily. MUCINEX 600 mg ER tablet Generic drug:  guaiFENesin ER Take 600 mg by mouth daily as needed. omeprazole 20 mg capsule Commonly known as:  PRILOSEC Take 1 Cap by mouth daily. rosuvastatin 10 mg tablet Commonly known as:  CRESTOR Take 1 Tab by mouth nightly. suvorexant 10 mg tablet Commonly known as:  Rosealee Milks Take 1 Tab by mouth nightly as needed for Insomnia. Max Daily Amount: 10 mg.  
  
 traMADol 50 mg tablet Commonly known as:  ULTRAM  
Take 1 Tab by mouth every twelve (12) hours as needed for Pain. Max Daily Amount: 100 mg.  
  
 valsartan-hydroCHLOROthiazide 320-25 mg per tablet Commonly known as:  DIOVAN-HCT Take 1 Tab by mouth daily. Prescriptions Printed Refills  
 cpap machine kit 0 Sig: by Does Not Apply route. Pressure setting is 11 mm Hg Diagnosis is NICOLÁS Class: Print Route: Does Not Apply Prescriptions Sent to Pharmacy Refills  
 omeprazole (PRILOSEC) 20 mg capsule 3 Sig: Take 1 Cap by mouth daily. Class: Normal  
 Pharmacy: [address]  #: [phone] Route: Oral  
  
We Performed the Following CBC WITH AUTOMATED DIFF [92404 CPT(R)] HEMOGLOBIN A1C WITH EAG [12658 CPT(R)] LIPID PANEL [42745 CPT(R)] METABOLIC PANEL, COMPREHENSIVE [26539 CPT(R)] Follow-up Instructions Return in about 6 months (around 8/27/2018) for medicare yearly exam . Patient Instructions Blood pressure is excellent If Nausea persists schedule appointment with gastroenterologist to have a possible repeat endoscopy Introducing [facility] SERVICES! Dear Andrew Acevedo: Thank you for requesting a [employer] account. Our records indicate that you already have an active [employer] account. You can access your account anytime at [email or web address] Did you know that you can access your hospital and ER discharge instructions at any time in [employer]? You can also review all of your test results from your hospital stay or ER visit. Additional Information If you have questions, please visit the Frequently Asked Questions section of the [employer] website at [email or web address] Remember, [employer] is NOT to be used for urgent needs. For medical emergencies, dial 911. Now available from your iPhone and Android! Please provide this summary of care documentation to your next provider. Your primary care clinician is listed as LANI Robles. If you have any questions after today's visit, please call [phone].

## 2018-02-28 LAB
ALBUMIN SERPL-MCNC: 4.3 G/DL (ref 3.6–4.8)
ALBUMIN/GLOB SERPL: 1.8 {RATIO} (ref 1.2–2.2)
ALP SERPL-CCNC: 52 IU/L (ref 39–117)
ALT SERPL-CCNC: 22 IU/L (ref 0–32)
AST SERPL-CCNC: 26 IU/L (ref 0–40)
BASOPHILS # BLD AUTO: 0 X10E3/UL (ref 0–0.2)
BASOPHILS NFR BLD AUTO: 1 %
BILIRUB SERPL-MCNC: 0.4 MG/DL (ref 0–1.2)
BUN SERPL-MCNC: 16 MG/DL (ref 8–27)
BUN/CREAT SERPL: 25 (ref 12–28)
CALCIUM SERPL-MCNC: 9.4 MG/DL (ref 8.7–10.3)
CHLORIDE SERPL-SCNC: 99 MMOL/L (ref 96–106)
CHOLEST SERPL-MCNC: 140 MG/DL (ref 100–199)
CO2 SERPL-SCNC: 28 MMOL/L (ref 18–29)
CREAT SERPL-MCNC: 0.63 MG/DL (ref 0.57–1)
EOSINOPHIL # BLD AUTO: 0.1 X10E3/UL (ref 0–0.4)
EOSINOPHIL NFR BLD AUTO: 3 %
ERYTHROCYTE [DISTWIDTH] IN BLOOD BY AUTOMATED COUNT: 14 % (ref 12.3–15.4)
EST. AVERAGE GLUCOSE BLD GHB EST-MCNC: 131 MG/DL
GFR SERPLBLD CREATININE-BSD FMLA CKD-EPI: 108 ML/MIN/1.73
GFR SERPLBLD CREATININE-BSD FMLA CKD-EPI: 94 ML/MIN/1.73
GLOBULIN SER CALC-MCNC: 2.4 G/DL (ref 1.5–4.5)
GLUCOSE SERPL-MCNC: 93 MG/DL (ref 65–99)
HBA1C MFR BLD: 6.2 % (ref 4.8–5.6)
HCT VFR BLD AUTO: 41.1 % (ref 34–46.6)
HDLC SERPL-MCNC: 45 MG/DL
HGB BLD-MCNC: 13.7 G/DL (ref 11.1–15.9)
IMM GRANULOCYTES # BLD: 0 X10E3/UL (ref 0–0.1)
IMM GRANULOCYTES NFR BLD: 0 %
LDLC SERPL CALC-MCNC: 70 MG/DL (ref 0–99)
LYMPHOCYTES # BLD AUTO: 2.1 X10E3/UL (ref 0.7–3.1)
LYMPHOCYTES NFR BLD AUTO: 48 %
MCH RBC QN AUTO: 30 PG (ref 26.6–33)
MCHC RBC AUTO-ENTMCNC: 33.3 G/DL (ref 31.5–35.7)
MCV RBC AUTO: 90 FL (ref 79–97)
MONOCYTES # BLD AUTO: 0.4 X10E3/UL (ref 0.1–0.9)
MONOCYTES NFR BLD AUTO: 9 %
NEUTROPHILS # BLD AUTO: 1.7 X10E3/UL (ref 1.4–7)
NEUTROPHILS NFR BLD AUTO: 39 %
PLATELET # BLD AUTO: 334 X10E3/UL (ref 150–379)
POTASSIUM SERPL-SCNC: 3.8 MMOL/L (ref 3.5–5.2)
PROT SERPL-MCNC: 6.7 G/DL (ref 6–8.5)
RBC # BLD AUTO: 4.57 X10E6/UL (ref 3.77–5.28)
SODIUM SERPL-SCNC: 140 MMOL/L (ref 134–144)
TRIGL SERPL-MCNC: 125 MG/DL (ref 0–149)
VLDLC SERPL CALC-MCNC: 25 MG/DL (ref 5–40)
WBC # BLD AUTO: 4.4 X10E3/UL (ref 3.4–10.8)

## 2018-02-28 NOTE — PROGRESS NOTES
normal kidney and liver function  Cholesterol is excellent   Pre diabetes is trending up - patient needs to decrease sugar intake and increase exercise repeat at follow up visit

## 2018-03-15 DIAGNOSIS — F51.01 PRIMARY INSOMNIA: ICD-10-CM

## 2018-03-15 NOTE — TELEPHONE ENCOUNTER
PCP: Saint Kawasaki, MD    Last fill: 10/27/17 #30  Last appt: 2/27/2018  Future Appointments  Date Time Provider Christi Moseley   8/27/2018 9:45 AM MD Sherin Cardenas 87       Requested Prescriptions     Pending Prescriptions Disp Refills    suvorexant (BELSOMRA) 10 mg tablet 30 Tab 0     Sig: Take 1 Tab by mouth nightly as needed for Insomnia. Max Daily Amount: 10 mg.

## 2018-03-15 NOTE — TELEPHONE ENCOUNTER
From: Elisabet Lazaro  To: Precious Lockhart MD  Sent: 3/15/2018 4:36 PM EDT  Subject: Medication Renewal Request    Original authorizing provider: Sinda Peaches, MD Reynaldo Rubinstein Burman Hoffman would like a refill of the following medications:  suvorexant (BELSOMRA) 10 mg tablet Precious Lockhart MD]    Preferred pharmacy: [place] [facility] [address] [address] [place] [address] RD. Comment:  Please send a new prescription for Belsomra to Abdiel Caldera.  Thank you Tere Breaux

## 2018-03-16 ENCOUNTER — TELEPHONE (OUTPATIENT)
Dept: INTERNAL MEDICINE CLINIC | Age: 68
End: 2018-03-16

## 2018-03-16 DIAGNOSIS — G47.33 OSA (OBSTRUCTIVE SLEEP APNEA): Primary | ICD-10-CM

## 2018-03-16 NOTE — TELEPHONE ENCOUNTER
Called patient. Two patient identifiers verified. Advised order for CPAP machine and LOV notes faxed to Yahaira Pearl at [phone]. Fax confirmation received.

## 2018-03-16 NOTE — TELEPHONE ENCOUNTER
#[phone] pt states she is due for a new c pap machine. She needs to get this from [facility] for this. They need an order and last o/n faxed to them. There needs to be diagnosis code etc.  You do have pt's sleep study she states    Fax #[phone]        Pt will be going out and in this afternoon if you need to contact her.  #[phone]

## 2018-03-19 DIAGNOSIS — F51.01 PRIMARY INSOMNIA: ICD-10-CM

## 2018-03-19 NOTE — TELEPHONE ENCOUNTER
PCP: Rachell Avalos MD    Last appt: 2/27/2018  Future Appointments  Date Time Provider Christi Moseley   8/27/2018 9:45 AM [facility]       Requested Prescriptions     Pending Prescriptions Disp Refills    suvorexant (BELSOMRA) 10 mg tablet 30 Tab 0     Sig: Take 1 Tab by mouth nightly as needed for Insomnia. Max Daily Amount: 10 mg.

## 2018-03-19 NOTE — TELEPHONE ENCOUNTER
From: Katty Lopez  To: Duane Pemberton MD  Sent: 3/19/2018 9:54 AM EDT  Subject: Medication Renewal Request    Original authorizing provider: MD Sonny Flores. Issa Young would like a refill of the following medications:  suvorexant (BELSOMRA) 10 mg tablet Duane Pemberton MD]    Preferred pharmacy: [place] [address], [address] [facility] [address]. Comment:  I requested to have this script refilled. Friday I spoke with the nurse and she told me it was approved and she would send it to my pharmacy. I spoke with my pharmacist today and they have not received the new script.  [facility] is [facility]. Thank you, Andreea Morrell

## 2018-03-20 NOTE — TELEPHONE ENCOUNTER
Called [facility] to [address] [facility] per Dr. Maria Del Rosario Bhatt.

## 2018-04-20 NOTE — PERIOP NOTES
[facility]  Ambulatory Surgery Unit  Pre-operative Instructions    Procedure Date  4/25            Tentative Arrival Time 0615      1. On the day of your procedure, please report to the Ambulatory Surgery Unit Registration Desk and sign in at your designated time. The Ambulatory Surgery Unit is located in [facility] on the Critical access hospital side of the [facility] across from the [address] [place] [address]. Please have all of your health insurance cards and a photo ID. 2. You must have someone with you to drive you home as directed by your surgeon. 3. You may have a light breakfast and take normal morning medications. 4. We recommend you do not drink any alcoholic beverages for 24 hours before and after your procedure. 5. Contact your surgeons office for instructions on the following medications: non-steroidal anti-inflammatory drugs (i.e. Advil, Aleve), vitamins, and supplements. (Some surgeons will want you to stop these medications prior to surgery and others may allow you to take them)   **If you are currently taking Plavix, Coumadin, Aspirin and/or other blood-thinning agents, contact your surgeon for instructions. ** Your surgeon will partner with the physician prescribing these medications to determine if it is safe to stop or if you need to continue taking. Please do not stop taking these medications without instructions from your surgeon. 6. In an effort to help prevent surgical site infection, we ask that you shower with an anti-bacterial soap (i.e. Dial or Safeguard) on the morning of your procedure. Do not apply any lotions, powders, or deodorants after showering. 7. Wear comfortable clothes. Wear glasses instead of contacts. Do not bring any jewelry or money (other than copays or fees as instructed). Do not wear make-up, particularly mascara, the morning of your procedure. Wear your hair loose or down, no ponytails, buns, pratik pins or clips.  All body piercings must be removed. 8. You should understand that if you do not follow these instructions your procedure may be cancelled. If your physical condition changes (i.e. fever, cold or flu) please contact your surgeon as soon as possible. 9. It is important that you be on time. If a situation occurs where you may be late, or if you have any questions or problems, please call [phone].    10. Your procedure time may be subject to change. You will receive a phone call the day prior to confirm your arrival time. I understand a pre-operative phone call will be made to verify my procedure time. In the event that I am not available, I give permission for a message to be left on my answering service and/or with another person?       yes      Reviewed by phone with pt, verbalized understanding   ___________________      ___________________      ___________________  (Signature of Patient)          (Witness)                   (Date and Time)

## 2018-04-24 PROBLEM — H26.492 LEFT POSTERIOR CAPSULAR OPACIFICATION: Status: ACTIVE | Noted: 2018-04-24

## 2018-04-25 ENCOUNTER — HOSPITAL ENCOUNTER (OUTPATIENT)
Age: 68
Setting detail: OUTPATIENT SURGERY
Discharge: HOME | End: 2018-04-25
Attending: OPHTHALMOLOGY | Admitting: OPHTHALMOLOGY
Payer: MEDICARE

## 2018-04-25 VITALS
RESPIRATION RATE: 18 BRPM | HEIGHT: 64 IN | TEMPERATURE: 97.7 F | DIASTOLIC BLOOD PRESSURE: 67 MMHG | WEIGHT: 163 LBS | OXYGEN SATURATION: 96 % | SYSTOLIC BLOOD PRESSURE: 133 MMHG | BODY MASS INDEX: 27.83 KG/M2 | HEART RATE: 57 BPM

## 2018-04-25 PROCEDURE — 74011000250 HC RX REV CODE- 250: Performed by: OPHTHALMOLOGY

## 2018-04-25 PROCEDURE — 74011000250 HC RX REV CODE- 250

## 2018-04-25 PROCEDURE — 76030000017 HC AMB SURG FIRST 0.5 HR INTENSV-TIER 1: Performed by: OPHTHALMOLOGY

## 2018-04-25 RX ORDER — TROPICAMIDE 10 MG/ML
SOLUTION/ DROPS OPHTHALMIC
Status: COMPLETED
Start: 2018-04-25 | End: 2018-04-25

## 2018-04-25 RX ORDER — PHENYLEPHRINE HYDROCHLORIDE 25 MG/ML
1 SOLUTION/ DROPS OPHTHALMIC
Status: DISCONTINUED | OUTPATIENT
Start: 2018-04-25 | End: 2018-04-25 | Stop reason: HOSPADM

## 2018-04-25 RX ORDER — TROPICAMIDE 10 MG/ML
1 SOLUTION/ DROPS OPHTHALMIC
Status: DISCONTINUED | OUTPATIENT
Start: 2018-04-25 | End: 2018-04-25 | Stop reason: HOSPADM

## 2018-04-25 RX ORDER — PROPARACAINE HYDROCHLORIDE 5 MG/ML
1 SOLUTION/ DROPS OPHTHALMIC
Status: COMPLETED | OUTPATIENT
Start: 2018-04-25 | End: 2018-04-25

## 2018-04-25 RX ADMIN — PHENYLEPHRINE HYDROCHLORIDE 1 DROP: 25 SOLUTION/ DROPS OPHTHALMIC at 07:00

## 2018-04-25 RX ADMIN — PROPARACAINE HYDROCHLORIDE 1 DROP: 5 SOLUTION/ DROPS OPHTHALMIC at 07:42

## 2018-04-25 RX ADMIN — TROPICAMIDE 1 DROP: 10 SOLUTION/ DROPS OPHTHALMIC at 06:49

## 2018-04-25 RX ADMIN — TROPICAMIDE 1 DROP: 10 SOLUTION/ DROPS OPHTHALMIC at 06:54

## 2018-04-25 RX ADMIN — BALANCED SALT SOLUTION 20 DROP: 6.4; .75; .48; .3; 3.9; 1.7 SOLUTION OPHTHALMIC at 07:45

## 2018-04-25 RX ADMIN — TROPICAMIDE 1 DROP: 10 SOLUTION/ DROPS OPHTHALMIC at 07:00

## 2018-04-25 RX ADMIN — PHENYLEPHRINE HYDROCHLORIDE 1 DROP: 25 SOLUTION/ DROPS OPHTHALMIC at 06:49

## 2018-04-25 RX ADMIN — PROPARACAINE HYDROCHLORIDE 1 DROP: 5 SOLUTION/ DROPS OPHTHALMIC at 06:54

## 2018-04-25 RX ADMIN — PROPARACAINE HYDROCHLORIDE 1 DROP: 5 SOLUTION/ DROPS OPHTHALMIC at 07:00

## 2018-04-25 RX ADMIN — PHENYLEPHRINE HYDROCHLORIDE 1 DROP: 25 SOLUTION/ DROPS OPHTHALMIC at 06:54

## 2018-04-25 RX ADMIN — HYPROMELLOSE 2910 (4000 MPA.S) 1 DROP: 25 SOLUTION/ DROPS OPHTHALMIC at 07:43

## 2018-04-25 RX ADMIN — FLUOROMETHOLONE 1 DROP: 2.5 SUSPENSION/ DROPS OPHTHALMIC at 07:46

## 2018-04-25 RX ADMIN — PROPARACAINE HYDROCHLORIDE 1 DROP: 5 SOLUTION/ DROPS OPHTHALMIC at 06:49

## 2018-04-25 NOTE — OP NOTES
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 ANESTHESIA: Topical.    LASER PARAMETERS:  Power:  1.8 millijoules per shot. Total shots delivered:  60    Estimated blood loss:None  Complications:None  Specimen removed: None    DESCRIPTION OF PROCEDURE: The patient was brought to the holding area where she received several instillations of Mydriacyl 1%, Mathew-Synephrine 2.5%, and tetracaine until adequate pupillary dilatation was achieved. The patient's vital signs were recorded. The patient was then brought to the laser suite and received 1 drop of tetracaine preoperatively. The patient was then seated at the laser and the Moe capsulotomy lens was placed on the eye. The patient's posterior capsular opacification was then cleared utilizing the laser. Following this the eye was rinsed with BSS solution to remove the Goniosol solution from the surface of the eye, and the patient received 1 drop of fluorometholone drops in the operated eye. Iopidine was used at the discretion of the surgeon depending on the amount of energy necessary to clear the opacified capsule. Instructions were given to the patient verbally and written to use her FML drops 3 times a day at 9 a.m., 3 p.m., and 9 p.m. for the next 3 days. The patient will be following up with us postoperatively in the office.     [address] [place] [address],   4/25/2018

## 2018-04-30 ENCOUNTER — TELEPHONE (OUTPATIENT)
Dept: INTERNAL MEDICINE CLINIC | Age: 68
End: 2018-04-30

## 2018-04-30 DIAGNOSIS — Z12.39 SCREENING FOR BREAST CANCER: Primary | ICD-10-CM

## 2018-04-30 NOTE — TELEPHONE ENCOUNTER
Called patient. Two patient identifiers verified. Advised patient order for mammogram was entered. Patient verbalized understanding. States she will schedule to have done at [facility] with Dr. Olam Cushing office.

## 2018-04-30 NOTE — TELEPHONE ENCOUNTER
Patient requesting order for routine screening mammogram.  Last mammo 4/14/17, BIRADS 1.   Order pended to MD.

## 2018-04-30 NOTE — TELEPHONE ENCOUNTER
Patient requesting to see if she can get her mammogram ordered. Patient initially asked for a referral but at this time, she does not require referrals.

## 2018-05-02 ENCOUNTER — HOSPITAL ENCOUNTER (OUTPATIENT)
Dept: MAMMOGRAPHY | Age: 68
Discharge: HOME | End: 2018-05-02
Payer: MEDICARE

## 2018-05-02 DIAGNOSIS — Z12.39 SCREENING FOR BREAST CANCER: ICD-10-CM

## 2018-05-02 PROCEDURE — 77067 SCR MAMMO BI INCL CAD: CPT

## 2018-07-17 ENCOUNTER — TELEPHONE (OUTPATIENT)
Dept: INTERNAL MEDICINE CLINIC | Age: 68
End: 2018-07-17

## 2018-07-17 DIAGNOSIS — I10 ESSENTIAL HYPERTENSION: Primary | ICD-10-CM

## 2018-07-17 NOTE — TELEPHONE ENCOUNTER
Patient states pharmacist advised replacement for Valsartan recall would be Olmesartan 40/12. 5. Please call to discuss & advise.  Thank you

## 2018-07-17 NOTE — TELEPHONE ENCOUNTER
Left message for patient that I am sending her message regarding her Valsartan to Dr. Johnny Mercado. I will call her once she responds.

## 2018-07-17 NOTE — TELEPHONE ENCOUNTER
Spoke with patient. Two pt identifiers confirmed. Advised patient to contact the pharmacy regarding the recent recall on Valsartan. Pt verbalized understanding of information discussed w/ no further questions at this time.

## 2018-07-17 NOTE — TELEPHONE ENCOUNTER
Patient states she & her  both are patient's of Dr. Pino Howard & need to discuss the recall on Valsartan as they both are on this medication & would like to know the doctors feedback on the recall. Please call to advise.  Thank you

## 2018-07-18 RX ORDER — OLMESARTAN MEDOXOMIL AND HYDROCHLOROTHIAZIDE 40/12.5 40; 12.5 MG/1; MG/1
1 TABLET ORAL DAILY
Qty: 30 TAB | Refills: 1 | Status: SHIPPED | OUTPATIENT
Start: 2018-07-18 | End: 2018-09-04 | Stop reason: SDUPTHER

## 2018-07-18 NOTE — TELEPHONE ENCOUNTER
Left message for patient per Dr. Javier Erwin that a prescription for a replacement for her Valsartan has been sent to her pharmacy. Patient advised that she will need to monitor her blood pressure for the first week of taking the new medication and call with the readings. Patient advised to call the office with any questions.

## 2018-07-18 NOTE — TELEPHONE ENCOUNTER
New prescription sent  For Olmesartan 40/12.5.- patient should monitor her blood pressure during the first week of transition.  Call with BP reads in one week  Thanks

## 2018-07-30 ENCOUNTER — TELEPHONE (OUTPATIENT)
Dept: INTERNAL MEDICINE CLINIC | Age: 68
End: 2018-07-30

## 2018-07-30 NOTE — TELEPHONE ENCOUNTER
#[phone] b/p readings that she was to keep track of.     238 - 686  68 - 78   This is the range pt has been getting since starting on 7-19-18

## 2018-07-31 NOTE — TELEPHONE ENCOUNTER
MD Mackenzie Johnson LPN        Caller: Unspecified (Yesterday,  1:24 PM)                     Continue current therapy       Spoke with patient. Two pt identifiers confirmed. Patient notified per Dr. Wilma Wolfe that her recent BP readings are fine and to continue with current medications. Pt verbalized understanding of information discussed w/ no further questions at this time.

## 2018-08-31 ENCOUNTER — OFFICE VISIT (OUTPATIENT)
Dept: INTERNAL MEDICINE CLINIC | Age: 68
End: 2018-08-31

## 2018-08-31 VITALS
HEART RATE: 59 BPM | RESPIRATION RATE: 18 BRPM | TEMPERATURE: 97.8 F | SYSTOLIC BLOOD PRESSURE: 127 MMHG | OXYGEN SATURATION: 99 % | WEIGHT: 164 LBS | HEIGHT: 64 IN | BODY MASS INDEX: 28 KG/M2 | DIASTOLIC BLOOD PRESSURE: 79 MMHG

## 2018-08-31 DIAGNOSIS — G89.29 CHRONIC MIDLINE LOW BACK PAIN WITHOUT SCIATICA: ICD-10-CM

## 2018-08-31 DIAGNOSIS — E78.00 HIGH CHOLESTEROL: ICD-10-CM

## 2018-08-31 DIAGNOSIS — M54.42 ACUTE BACK PAIN WITH SCIATICA, LEFT: Primary | ICD-10-CM

## 2018-08-31 DIAGNOSIS — G25.81 RESTLESS LEG: ICD-10-CM

## 2018-08-31 DIAGNOSIS — G89.29 CHRONIC MIDLINE LOW BACK PAIN WITH LEFT-SIDED SCIATICA: Primary | ICD-10-CM

## 2018-08-31 DIAGNOSIS — M54.50 CHRONIC MIDLINE LOW BACK PAIN WITHOUT SCIATICA: ICD-10-CM

## 2018-08-31 DIAGNOSIS — I10 ESSENTIAL HYPERTENSION: ICD-10-CM

## 2018-08-31 DIAGNOSIS — M54.42 CHRONIC MIDLINE LOW BACK PAIN WITH LEFT-SIDED SCIATICA: Primary | ICD-10-CM

## 2018-08-31 DIAGNOSIS — Z00.00 MEDICARE ANNUAL WELLNESS VISIT, SUBSEQUENT: ICD-10-CM

## 2018-08-31 DIAGNOSIS — R73.09 ELEVATED GLUCOSE: ICD-10-CM

## 2018-08-31 DIAGNOSIS — M25.552 LEFT HIP PAIN: ICD-10-CM

## 2018-08-31 DIAGNOSIS — G89.29 OTHER CHRONIC PAIN: ICD-10-CM

## 2018-08-31 RX ORDER — TRAMADOL HYDROCHLORIDE 50 MG/1
50 TABLET ORAL
Qty: 30 TAB | Refills: 1 | Status: SHIPPED | OUTPATIENT
Start: 2018-08-31 | End: 2018-12-14 | Stop reason: SDUPTHER

## 2018-08-31 RX ORDER — GABAPENTIN 100 MG/1
200 CAPSULE ORAL
Qty: 60 CAP | Refills: 1 | Status: SHIPPED | OUTPATIENT
Start: 2018-08-31 | End: 2018-12-07 | Stop reason: SDUPTHER

## 2018-08-31 NOTE — PROGRESS NOTES
This is the Subsequent Medicare Annual Wellness Exam, performed 12 months or more after the Initial AWV or the last Subsequent AWV    I have reviewed the patient's medical history in detail and updated the computerized patient record. HTN    Started having pronounced back pain in late June 2018 - pain starts ho and radiates to the leg - crushing pain. 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 On PPI PRN   Does complain of intermittent nausea and plans to follow up with Dr Cara Banks  Patient had EGD which did not show dysplasia, repeat endoscopy in 5 years   Had gastritis  Taking omeprazile     HTN: BP is excellent currently on benicar ( valsartan recalled so changed)      Taking ultram PRN for back pain at night and ultram helps.         Insomnia: tried [place] and elavil in the past. Last visit patient prescribed belsomra and works but cost is an issue.     Restless leg: resistant to get treatment, sleeping only for 2 hours   History     Past Medical History:   Diagnosis Date    Teague esophagus     small segment in 2014 Dr Andres Sahni Bradycardia     had negative stress test and nuclear imaging done in 2015    GERD (gastroesophageal reflux disease)     Glaucoma     H/O seasonal allergies     High cholesterol     Hypertension     NICOLÁS on CPAP     Osteopenia     Vertigo       Past Surgical History:   Procedure Laterality Date    HX APPENDECTOMY      with jose    HX BUNIONECTOMY Bilateral     HX CATARACT REMOVAL Bilateral 2017    HX HYSTERECTOMY      HX OPEN CHOLECYSTECTOMY      with appy     Current Outpatient Prescriptions   Medication Sig Dispense Refill    varicella-zoster recombinant, PF, (SHINGRIX, PF,) 50 mcg/0.5 mL susr injection 0.5mL by IntraMUSCular route once now and then repeat in 2-6 months 0.5 mL 1    gabapentin (NEURONTIN) 100 mg capsule Take 2 Caps by mouth nightly. 60 Cap 1    traMADol (ULTRAM) 50 mg tablet Take 1 Tab by mouth every twelve (12) hours as needed for Pain. Max Daily Amount: 100 mg. 30 Tab 1    olmesartan-hydroCHLOROthiazide (BENICAR HCT) 40-12.5 mg per tablet Take 1 Tab by mouth daily. 30 Tab 1    suvorexant (BELSOMRA) 10 mg tablet Take 1 Tab by mouth nightly as needed for Insomnia. Max Daily Amount: 10 mg. 30 Tab 0    cpap machine kit by Does Not Apply route. Pressure setting is 11 mm Hg  Diagnosis is NICOLÁS 1 Kit 0    omeprazole (PRILOSEC) 20 mg capsule Take 1 Cap by mouth daily. 90 Cap 3    rosuvastatin (CRESTOR) 10 mg tablet Take 1 Tab by mouth nightly. 90 Tab 3    albuterol (PROVENTIL HFA, VENTOLIN HFA, PROAIR HFA) 90 mcg/actuation inhaler Take 1 Puff by inhalation every four (4) hours as needed for Wheezing. 1 Inhaler 3    guaiFENesin ER (MUCINEX) 600 mg ER tablet Take 600 mg by mouth daily as needed.  fluticasone (FLONASE) 50 mcg/actuation nasal spray 2 Sprays by Both Nostrils route daily as needed.  aspirin 81 mg chewable tablet Take 81 mg by mouth daily.  omega-3 fatty acids-vitamin e (FISH OIL) 1,000 mg cap Take 3 Caps by mouth daily.  calcium carbonate (OS-VJ) 500 mg calcium (1,250 mg) tablet Take 1 Tab by mouth daily.        Allergies   Allergen Reactions    Latex Rash    Codeine Nausea and Vomiting    Thiuram Analogues Rash     Family History   Problem Relation Age of Onset    Colon Cancer Mother 76    Coronary Artery Disease Father 61    Breast Cancer Other      29's or 42's     Social History   Substance Use Topics    Smoking status: Never Smoker    Smokeless tobacco: Never Used    Alcohol use No     Patient Active Problem List   Diagnosis Code    Chest pain R07.9    Hypertension I10    High cholesterol E78.00    Osteopenia M85.80    Sleep apnea G47.30    Primary open angle glaucoma of right eye, mild stage H40.1111    Combined forms of age-related cataract of right eye H25.811    Primary open angle glaucoma of left eye, mild stage H40.1121    Combined forms of age-related cataract of left eye H25.812    Right posterior capsular opacification H26.491    Left posterior capsular opacification H26.492       Depression Risk Factor Screening:     PHQ over the last two weeks 8/31/2018   Little interest or pleasure in doing things Not at all   Feeling down, depressed, irritable, or hopeless Several days   Total Score PHQ 2 1     Alcohol Risk Factor Screening: You do not drink alcohol or very rarely. Functional Ability and Level of Safety:   Hearing Loss  Hearing is good. Activities of Daily Living  The home contains: no safety equipment. Patient does total self care    Fall Risk  Fall Risk Assessment, last 12 mths 8/31/2018   Able to walk? Yes   Fall in past 12 months? No       Abuse Screen  Patient is not abused    Cognitive Screening   Evaluation of Cognitive Function:  Has your family/caregiver stated any concerns about your memory: no  Normal    Patient Care Team   Patient Care Team:  MD Abril as PCP - General (Internal Medicine)  Mesha Barron MD as Consulting Provider (Gastroenterology)    Assessment/Plan   Education and counseling provided:  Are appropriate based on today's review and evaluation       Diagnoses and all orders for this visit:    1. Medicare annual wellness visit, subsequent  -     varicella-zoster recombinant, PF, (SHINGRIX, PF,) 50 mcg/0.5 mL susr injection; 0.5mL by IntraMUSCular route once now and then repeat in 2-6 months    2. Chronic midline low back pain with left-sided sciatica: notes increased pain in left leg, Differential disc disease, degenerative disease and spinal stenosis versus hip disease. Will order X ray of back and left hip   Start gabapentin for pain  Pending results patient may need PT   -     METABOLIC PANEL, COMPREHENSIVE  -     CBC WITH AUTOMATED DIFF  -     gabapentin (NEURONTIN) 100 mg capsule;  Take 2 Caps by mouth nightly.  -     COMPLIANCE DRUG SCREEN/PRESCRIPTION MONITORING    3. Restless leg  -     gabapentin (NEURONTIN) 100 mg capsule; Take 2 Caps by mouth nightly. 4. Essential hypertension: stable on Benicar  -     METABOLIC PANEL, COMPREHENSIVE  -     CBC WITH AUTOMATED DIFF    5. Elevated glucose: prediabetes: working on diet and exercise   -     HEMOGLOBIN A1C W/O EAG    6. Chronic midline low back pain without sciatica  -     traMADol (ULTRAM) 50 mg tablet; Take 1 Tab by mouth every twelve (12) hours as needed for Pain. Max Daily Amount: 100 mg.  -     COMPLIANCE DRUG SCREEN/PRESCRIPTION MONITORING    7. High cholesterol: on crestor  -     LIPID PANEL    8. GERD/Barretts: last EGD in 2018 reviewed today and repeat due in 5 years.  Patient is on PPI     Health Maintenance Due   Topic Date Due    ZOSTER VACCINE AGE 60>  05/31/2011    Influenza Age 5 to Adult  08/01/2018

## 2018-08-31 NOTE — MR AVS SNAPSHOT
[address] 
[phone] Patient: Susana Tan MRN: [id number] BBF:[birth year] Visit Information Date & Time Provider Department Dept. Phone Encounter #  
 8/31/2018  2:30 PM [facility], [address] [place] [address],[address] [phone] [id number] Upcoming Health Maintenance Date Due ZOSTER VACCINE AGE 60> 5/31/2011 Influenza Age 5 to Adult 8/1/2018 Pneumococcal 65+ Low/Medium Risk (2 of 2 - PPSV23) 10/27/2018 MEDICARE YEARLY EXAM 10/28/2018 GLAUCOMA SCREENING Q2Y 3/1/2019 BREAST CANCER SCRN MAMMOGRAM 5/2/2020 COLONOSCOPY 7/10/2025 DTaP/Tdap/Td series (2 - Td) 3/10/2026 Allergies as of 8/31/2018  Review Complete On: 8/31/2018 By: Justen Fulton Severity Noted Reaction Type Reactions Latex  05/26/2017    Rash Codeine  10/26/2015    Nausea and Vomiting Thiuram Analogues  03/23/2017    Rash Current Immunizations  Reviewed on 10/27/2017 Name Date Influenza High Dose Vaccine PF 10/27/2017 11:40 AM  
 Influenza Vaccine (Quad) PF 10/27/2015  5:27 PM  
 Pneumococcal Conjugate (PCV-13) 10/27/2017 11:41 AM  
 Tdap 3/10/2016 Not reviewed this visit You Were Diagnosed With   
  
 Codes Comments Medicare annual wellness visit, subsequent    -  Primary ICD-10-CM: Z00.00 ICD-9-CM: V70.0 Chronic midline low back pain with left-sided sciatica     ICD-10-CM: M54.42, G89.29 ICD-9-CM: 724.2, 724.3, 338.29 Restless leg     ICD-10-CM: G25.81 ICD-9-CM: 333.94 Essential hypertension     ICD-10-CM: I10 
ICD-9-CM: 401.9 Elevated glucose     ICD-10-CM: R73.09 
ICD-9-CM: 790.29 Chronic midline low back pain without sciatica     ICD-10-CM: M54.5, G89.29 ICD-9-CM: 724.2, 338.29 Left hip pain     ICD-10-CM: M25.552 ICD-9-CM: 719.45 Vitals BP Pulse Temp Resp Height(growth percentile) Weight(growth percentile) 127/79 (BP 1 Location: Right arm, BP Patient Position: Sitting) (!) 59 97.8 °F (36.6 °C) (Oral) 18 5' 4\" (1.626 m) 164 lb (74.4 kg) SpO2 BMI OB Status Smoking Status 99% 28.15 kg/m2 Hysterectomy Never Smoker BMI and BSA Data Body Mass Index Body Surface Area  
 28.15 kg/m 2 1.83 m 2 Preferred Pharmacy Pharmacy Name Phone [facility]. [phone] Your Updated Medication List  
  
   
This list is accurate as of 8/31/18  3:09 PM.  Always use your most recent med list.  
  
  
  
  
 albuterol 90 mcg/actuation inhaler Commonly known as:  PROVENTIL HFA, VENTOLIN HFA, PROAIR HFA Take 1 Puff by inhalation every four (4) hours as needed for Wheezing. aspirin 81 mg chewable tablet Take 81 mg by mouth daily. calcium carbonate 500 mg calcium (1,250 mg) tablet Commonly known as:  OS-VJ Take 1 Tab by mouth daily. cpap machine kit  
by Does Not Apply route. Pressure setting is 11 mm Hg Diagnosis is NICOLÁS FISH OIL 1,000 mg Cap Generic drug:  omega-3 fatty acids-vitamin e Take 3 Caps by mouth daily. FLONASE 50 mcg/actuation nasal spray Generic drug:  fluticasone 2 Sprays by Both Nostrils route daily as needed. gabapentin 100 mg capsule Commonly known as:  NEURONTIN Take 2 Caps by mouth nightly. MUCINEX 600 mg ER tablet Generic drug:  guaiFENesin ER Take 600 mg by mouth daily as needed. olmesartan-hydroCHLOROthiazide 40-12.5 mg per tablet Commonly known as:  BENICAR HCT Take 1 Tab by mouth daily. omeprazole 20 mg capsule Commonly known as:  PRILOSEC Take 1 Cap by mouth daily. rosuvastatin 10 mg tablet Commonly known as:  CRESTOR Take 1 Tab by mouth nightly. suvorexant 10 mg tablet Commonly known as:  Luis Alexis Take 1 Tab by mouth nightly as needed for Insomnia. Max Daily Amount: 10 mg.  
  
 traMADol 50 mg tablet Commonly known as:  ULTRAM  
Take 1 Tab by mouth every twelve (12) hours as needed for Pain. Max Daily Amount: 100 mg.  
  
 varicella-zoster recombinant (PF) 50 mcg/0.5 mL Susr injection Commonly known as:  SHINGRIX (PF)  
0.5mL by IntraMUSCular route once now and then repeat in 2-6 months Prescriptions Printed Refills  
 varicella-zoster recombinant, PF, (SHINGRIX, PF,) 50 mcg/0.5 mL susr injection 1 Si.5mL by IntraMUSCular route once now and then repeat in 2-6 months Class: Print  
 traMADol (ULTRAM) 50 mg tablet 1 Sig: Take 1 Tab by mouth every twelve (12) hours as needed for Pain. Max Daily Amount: 100 mg. Class: Print Route: Oral  
  
Prescriptions Sent to Pharmacy Refills  
 gabapentin (NEURONTIN) 100 mg capsule 1 Sig: Take 2 Caps by mouth nightly. Class: Normal  
 Pharmacy: [address] [place] [address] #: [phone] Route: Oral  
  
We Performed the Following CBC WITH AUTOMATED DIFF [55670 CPT(R)] HEMOGLOBIN A1C W/O EAG [50696 CPT(R)] METABOLIC PANEL, COMPREHENSIVE [82684 CPT(R)] To-Do List   
 2018 Imaging:  XR HIP LT W OR WO PELV MIN 4 VWS   
  
 2018 Imaging:  XR SPINE LUMB MIN 4 V Patient Instructions Medicare Wellness Visit, Female The best way to live healthy is to have a lifestyle where you eat a well-balanced diet, exercise regularly, limit alcohol use, and quit all forms of tobacco/nicotine, if applicable. Regular preventive services are another way to keep healthy. Preventive services (vaccines, screening tests, monitoring & exams) can help personalize your care plan, which helps you manage your own care. Screening tests can find health problems at the earliest stages, when they are easiest to treat.   
[facility] follows the current, evidence-based guidelines published by the [place] [employer] (USPSTF) when recommending preventive services for our patients. Because we follow these guidelines, sometimes recommendations change over time as research supports it. (For example, mammograms used to be recommended annually. Even though Medicare will still pay for an annual mammogram, the newer guidelines recommend a mammogram every two years for women of average risk.) Of course, you and your doctor may decide to screen more often for some diseases, based on your risk and your health status. Preventive services for you include: - Medicare offers their members a free annual wellness visit, which is time for you and your primary care provider to discuss and plan for your preventive service needs. Take advantage of this benefit every year! 
-All adults over the age of 72 should receive the recommended pneumonia vaccines. Current USPSTF guidelines recommend a series of two vaccines for the best pneumonia protection.  
-All adults should have a flu vaccine yearly and a tetanus vaccine every 10 years. All adults age 61 and older should receive a shingles vaccine once in their lifetime.   
-A bone mass density test is recommended when a woman turns 65 to screen for osteoporosis. This test is only recommended one time, as a screening. Some providers will use this same test as a disease monitoring tool if you already have osteoporosis. -All adults age 38-68 who are overweight should have a diabetes screening test once every three years.  
-Other screening tests and preventive services for persons with diabetes include: an eye exam to screen for diabetic retinopathy, a kidney function test, a foot exam, and stricter control over your cholesterol.  
-Cardiovascular screening for adults with routine risk involves an electrocardiogram (ECG) at intervals determined by your doctor.  
-Colorectal cancer screenings should be done for adults age 54-65 with no increased risk factors for colorectal cancer.   There are a number of acceptable methods of screening for this type of cancer. Each test has its own benefits and drawbacks. Discuss with your doctor what is most appropriate for you during your annual wellness visit. The different tests include: colonoscopy (considered the best screening method), a fecal occult blood test, a fecal DNA test, and sigmoidoscopy. -Breast cancer screenings are recommended every other year for women of normal risk, age 54-69. 
-Cervical cancer screenings for women over age 72 are only recommended with certain risk factors.  
-All adults born between [facility] should be screened once for Hepatitis C. Here is a list of your current Health Maintenance items (your personalized list of preventive services) with a due date: 
Health Maintenance Due Topic Date Due  Shingles Vaccine  Given prescription  Flu Vaccine  Due in the fall Introducing [facility] SERVICES! Dear Lady Burnette: Thank you for requesting a [employer] account. Our records indicate that you already have an active [employer] account. You can access your account anytime at [email or web address] Did you know that you can access your hospital and ER discharge instructions at any time in [employer]ali? You can also review all of your test results from your hospital stay or ER visit. Additional Information If you have questions, please visit the Frequently Asked Questions section of the [employer] website at [email or web address] Remember, [employer] is NOT to be used for urgent needs. For medical emergencies, dial 911. Now available from your iPhone and Android! Please provide this summary of care documentation to your next provider. Your primary care clinician is listed as LANI david. If you have any questions after today's visit, please call [phone].

## 2018-08-31 NOTE — PATIENT INSTRUCTIONS
Medicare Wellness Visit, Female     The best way to live healthy is to have a lifestyle where you eat a well-balanced diet, exercise regularly, limit alcohol use, and quit all forms of tobacco/nicotine, if applicable. Regular preventive services are another way to keep healthy. Preventive services (vaccines, screening tests, monitoring & exams) can help personalize your care plan, which helps you manage your own care. Screening tests can find health problems at the earliest stages, when they are easiest to treat. [facility] follows the current, evidence-based guidelines published by the [facility] [employer] ([facility]) when recommending preventive services for our patients. Because we follow these guidelines, sometimes recommendations change over time as research supports it. (For example, mammograms used to be recommended annually. Even though Medicare will still pay for an annual mammogram, the newer guidelines recommend a mammogram every two years for women of average risk.)  Of course, you and your doctor may decide to screen more often for some diseases, based on your risk and your health status. Preventive services for you include:  - Medicare offers their members a free annual wellness visit, which is time for you and your primary care provider to discuss and plan for your preventive service needs. Take advantage of this benefit every year!  -All adults over the age of 72 should receive the recommended pneumonia vaccines. Current USPSTF guidelines recommend a series of two vaccines for the best pneumonia protection.   -All adults should have a flu vaccine yearly and a tetanus vaccine every 10 years. All adults age 61 and older should receive a shingles vaccine once in their lifetime.    -A bone mass density test is recommended when a woman turns 65 to screen for osteoporosis. This test is only recommended one time, as a screening.  Some providers will use this same test as a disease monitoring tool if you already have osteoporosis. -All adults age 38-68 who are overweight should have a diabetes screening test once every three years.   -Other screening tests and preventive services for persons with diabetes include: an eye exam to screen for diabetic retinopathy, a kidney function test, a foot exam, and stricter control over your cholesterol.   -Cardiovascular screening for adults with routine risk involves an electrocardiogram (ECG) at intervals determined by your doctor.   -Colorectal cancer screenings should be done for adults age 54-65 with no increased risk factors for colorectal cancer. There are a number of acceptable methods of screening for this type of cancer. Each test has its own benefits and drawbacks. Discuss with your doctor what is most appropriate for you during your annual wellness visit. The different tests include: colonoscopy (considered the best screening method), a fecal occult blood test, a fecal DNA test, and sigmoidoscopy. -Breast cancer screenings are recommended every other year for women of normal risk, age 54-69.  -Cervical cancer screenings for women over age 72 are only recommended with certain risk factors.   -All adults born between [facility] should be screened once for Hepatitis C.      Here is a list of your current Health Maintenance items (your personalized list of preventive services) with a due date:  Health Maintenance Due   Topic Date Due    Shingles Vaccine  Given prescription     Flu Vaccine  Due in the fall

## 2018-08-31 NOTE — PROGRESS NOTES
Reviewed record in preparation for visit and have obtained necessary documentation. Identified pt with two pt identifiers(name and ). Chief Complaint   Patient presents with   [place] Wellness Visit       Health Maintenance Due   Topic Date Due    Shingles Vaccine  2011    Flu Vaccine  2018       Ms. Samuel Meyers has a reminder for a \"due or due soon\" health maintenance. I have asked that she discuss this further with her primary care provider for follow-up on this health maintenance. Coordination of Care Questionnaire:  :     1) Have you been to an emergency room, urgent care clinic since your last visit? no   Hospitalized since your last visit? no             2) Have you seen or consulted any other health care providers outside of [facility] since your last visit? no  (Include any pap smears or colon screenings in this section.)    3) In the event something were to happen to you and you were unable to speak on your behalf, do you have an Advance Directive/ Living Will in place stating your wishes? NO    Do you have an Advance Directive on file? no    4) Are you interested in receiving information on Advance Directives? NO    Patient is accompanied by self I have received verbal consent from Sha Michelle to discuss any/all medical information while they are present in the room.

## 2018-09-04 DIAGNOSIS — I10 ESSENTIAL HYPERTENSION: ICD-10-CM

## 2018-09-04 RX ORDER — OLMESARTAN MEDOXOMIL AND HYDROCHLOROTHIAZIDE 40/12.5 40; 12.5 MG/1; MG/1
1 TABLET ORAL DAILY
Qty: 30 TAB | Refills: 1 | Status: SHIPPED | OUTPATIENT
Start: 2018-09-04 | End: 2018-11-08 | Stop reason: SDUPTHER

## 2018-09-04 NOTE — TELEPHONE ENCOUNTER
PCP: Camilo Tavarez MD    Last appt: 8/31/2018  No future appointments. Requested Prescriptions     Pending Prescriptions Disp Refills    olmesartan-hydroCHLOROthiazide (BENICAR HCT) 40-12.5 mg per tablet 30 Tab 1     Sig: Take 1 Tab by mouth daily.

## 2018-09-04 NOTE — TELEPHONE ENCOUNTER
From: Brad Arthur  To: Delmy Patterson MD  Sent: 9/4/2018 9:43 AM EDT  Subject: Medication Renewal Request    Original authorizing provider: MD Juancarlos Montanez would like a refill of the following medications:  olmesartan-hydroCHLOROthiazide (BENICAR HCT) 40-12.5 mg per tablet Delmy Patterson MD]    Preferred pharmacy: [place] [facility] [id number], [address]     Comment:

## 2018-09-06 LAB
ALBUMIN SERPL-MCNC: 4.5 G/DL (ref 3.6–4.8)
ALBUMIN/GLOB SERPL: 1.6 {RATIO} (ref 1.2–2.2)
ALP SERPL-CCNC: 48 IU/L (ref 39–117)
ALT SERPL-CCNC: 17 IU/L (ref 0–32)
AST SERPL-CCNC: 25 IU/L (ref 0–40)
BASOPHILS # BLD AUTO: 0 X10E3/UL (ref 0–0.2)
BASOPHILS NFR BLD AUTO: 1 %
BILIRUB SERPL-MCNC: <0.2 MG/DL (ref 0–1.2)
BUN SERPL-MCNC: 16 MG/DL (ref 8–27)
BUN/CREAT SERPL: 24 (ref 12–28)
CALCIUM SERPL-MCNC: 9.9 MG/DL (ref 8.7–10.3)
CHLORIDE SERPL-SCNC: 100 MMOL/L (ref 96–106)
CHOLEST SERPL-MCNC: 168 MG/DL (ref 100–199)
CO2 SERPL-SCNC: 21 MMOL/L (ref 20–29)
CREAT SERPL-MCNC: 0.68 MG/DL (ref 0.57–1)
EOSINOPHIL # BLD AUTO: 0.1 X10E3/UL (ref 0–0.4)
EOSINOPHIL NFR BLD AUTO: 2 %
ERYTHROCYTE [DISTWIDTH] IN BLOOD BY AUTOMATED COUNT: 13.4 % (ref 12.3–15.4)
GLOBULIN SER CALC-MCNC: 2.9 G/DL (ref 1.5–4.5)
GLUCOSE SERPL-MCNC: 92 MG/DL (ref 65–99)
HBA1C MFR BLD: 5.8 % (ref 4.8–5.6)
HCT VFR BLD AUTO: 40.6 % (ref 34–46.6)
HDLC SERPL-MCNC: 57 MG/DL
HGB BLD-MCNC: 13.7 G/DL (ref 11.1–15.9)
IMM GRANULOCYTES # BLD: 0 X10E3/UL (ref 0–0.1)
IMM GRANULOCYTES NFR BLD: 0 %
LDLC SERPL CALC-MCNC: 80 MG/DL (ref 0–99)
LYMPHOCYTES # BLD AUTO: 1.9 X10E3/UL (ref 0.7–3.1)
LYMPHOCYTES NFR BLD AUTO: 39 %
MCH RBC QN AUTO: 31 PG (ref 26.6–33)
MCHC RBC AUTO-ENTMCNC: 33.7 G/DL (ref 31.5–35.7)
MCV RBC AUTO: 92 FL (ref 79–97)
MONOCYTES # BLD AUTO: 0.3 X10E3/UL (ref 0.1–0.9)
MONOCYTES NFR BLD AUTO: 6 %
NEUTROPHILS # BLD AUTO: 2.5 X10E3/UL (ref 1.4–7)
NEUTROPHILS NFR BLD AUTO: 52 %
PLATELET # BLD AUTO: 303 X10E3/UL (ref 150–379)
POTASSIUM SERPL-SCNC: 3.8 MMOL/L (ref 3.5–5.2)
PROT SERPL-MCNC: 7.4 G/DL (ref 6–8.5)
RBC # BLD AUTO: 4.42 X10E6/UL (ref 3.77–5.28)
SODIUM SERPL-SCNC: 141 MMOL/L (ref 134–144)
TRIGL SERPL-MCNC: 154 MG/DL (ref 0–149)
VLDLC SERPL CALC-MCNC: 31 MG/DL (ref 5–40)
WBC # BLD AUTO: 4.8 X10E3/UL (ref 3.4–10.8)

## 2018-09-07 LAB — DRUGS UR: NORMAL

## 2018-09-12 NOTE — PROGRESS NOTES
Normal kidney and liver function   Normal blood count   Blood sugar is better than previous - keep up the good work with healthy - low sugar diet  Cholesterol is doing well.  LDL bad cholesterol is at goal

## 2018-09-18 ENCOUNTER — TELEPHONE (OUTPATIENT)
Dept: INTERNAL MEDICINE CLINIC | Age: 68
End: 2018-09-18

## 2018-09-18 DIAGNOSIS — G89.29 CHRONIC MIDLINE LOW BACK PAIN WITH SCIATICA, SCIATICA LATERALITY UNSPECIFIED: Primary | ICD-10-CM

## 2018-09-18 DIAGNOSIS — M54.40 CHRONIC MIDLINE LOW BACK PAIN WITH SCIATICA, SCIATICA LATERALITY UNSPECIFIED: Primary | ICD-10-CM

## 2018-09-18 NOTE — TELEPHONE ENCOUNTER
Patient states she needs a call back in reference to several questions on when she needs to Return for appt, get the name of Back Specialist, discuss medication (gabapentin (NEURONTIN) 100 mg capsule) & also to get copy of lab results. Please call to discuss.  Thank you

## 2018-09-18 NOTE — TELEPHONE ENCOUNTER
MD Hannah Samuels LPN        Caller: Unspecified (Today, 11:01 AM)                     We could try lyrica if covered by her insurance start with 75mg BID and titrate up to 150mg BID - let me know if patient agrees to try medication   Referral to pain management Dr Radha Leiws sent       Spoke with patient. Two pt identifiers confirmed. Patient notified of the above information from Dr. Nilesh Mccauley. Patient states that she would like to think about starting the lyrica and she will call back and let us know what she wants to do. Patient given the contact information for Dr. Radha Lewis.  Pt verbalized understanding of information discussed w/ no further questions at this time.

## 2018-09-18 NOTE — TELEPHONE ENCOUNTER
Spoke with patient. Two pt identifiers confirmed. Patient states that she is not able to take the gabapentin. Patient states that it was causing her to have bad palpitations. Patient would like to know if there is something else that Dr. Demetrius Ford can recommend. Patient states that she would also like to know if Dr. Demetrius Ford can refer her to someone for her back pain. Advised patient that I will send her request to Dr. Demetrius Ford and I will call her back with her recommendations. Pt verbalized understanding of information discussed w/ no further questions at this time.

## 2018-10-25 ENCOUNTER — CLINICAL SUPPORT (OUTPATIENT)
Dept: INTERNAL MEDICINE CLINIC | Age: 68
End: 2018-10-25

## 2018-10-25 VITALS
WEIGHT: 164 LBS | HEART RATE: 66 BPM | DIASTOLIC BLOOD PRESSURE: 62 MMHG | HEIGHT: 64 IN | BODY MASS INDEX: 28 KG/M2 | RESPIRATION RATE: 16 BRPM | SYSTOLIC BLOOD PRESSURE: 115 MMHG | OXYGEN SATURATION: 100 % | TEMPERATURE: 98 F

## 2018-10-25 DIAGNOSIS — Z23 ENCOUNTER FOR IMMUNIZATION: Primary | ICD-10-CM

## 2018-10-25 NOTE — PROGRESS NOTES
After obtaining consent, and per verbal order from Dr. Joe Murray, patient received influenza vaccine given by Janet Saldivar LPN in Left Deltoid. Influenza Vaccine 0.5 mL IM now. Patient was observed for 10 minutes post injection. Patient tolerated injection well.

## 2018-10-25 NOTE — PATIENT INSTRUCTIONS
Vaccine Information Statement    Influenza (Flu) Vaccine (Inactivated or Recombinant): What you need to know    Many Vaccine Information Statements are available in [place] and other languages. See www.immunize.org/vis  Hojas de Información Sobre Vacunas están disponibles en Español y en muchos otros idiomas. Visite www.immunize.org/vis    1. Why get vaccinated? Influenza (flu) is a contagious disease that spreads around the [place] every year, usually between October and May. Flu is caused by influenza viruses, and is spread mainly by coughing, sneezing, and close contact. Anyone can get flu. Flu strikes suddenly and can last several days. Symptoms vary by age, but can include:   fever/chills   sore throat   muscle aches   fatigue   cough   headache    runny or stuffy nose    Flu can also lead to pneumonia and blood infections, and cause diarrhea and seizures in children. If you have a medical condition, such as heart or lung disease, flu can make it worse. Flu is more dangerous for some people. Infants and young children, people 72years of age and older, pregnant women, and people with certain health conditions or a weakened immune system are at greatest risk. Each year thousands of people in the [facility] die from flu, and many more are hospitalized. Flu vaccine can:   keep you from getting flu,   make flu less severe if you do get it, and   keep you from spreading flu to your family and other people. 2. Inactivated and recombinant flu vaccines    A dose of flu vaccine is recommended every flu season. Children 6 months through 6years of age may need two doses during the same flu season. Everyone else needs only one dose each flu season.        Some inactivated flu vaccines contain a very small amount of a mercury-based preservative called thimerosal. Studies have not shown thimerosal in vaccines to be harmful, but flu vaccines that do not contain thimerosal are available. There is no live flu virus in flu shots. They cannot cause the flu. There are many flu viruses, and they are always changing. Each year a new flu vaccine is made to protect against three or four viruses that are likely to cause disease in the upcoming flu season. But even when the vaccine doesnt exactly match these viruses, it may still provide some protection    Flu vaccine cannot prevent:   flu that is caused by a virus not covered by the vaccine, or   illnesses that look like flu but are not. It takes about 2 weeks for protection to develop after vaccination, and protection lasts through the flu season. 3. Some people should not get this vaccine    Tell the person who is giving you the vaccine:     If you have any severe, life-threatening allergies. If you ever had a life-threatening allergic reaction after a dose of flu vaccine, or have a severe allergy to any part of this vaccine, you may be advised not to get vaccinated. Most, but not all, types of flu vaccine contain a small amount of egg protein.  If you ever had Guillain-Barré Syndrome (also called GBS). Some people with a history of GBS should not get this vaccine. This should be discussed with your doctor.  If you are not feeling well. It is usually okay to get flu vaccine when you have a mild illness, but you might be asked to come back when you feel better. 4. Risks of a vaccine reaction    With any medicine, including vaccines, there is a chance of reactions. These are usually mild and go away on their own, but serious reactions are also possible. Most people who get a flu shot do not have any problems with it.      Minor problems following a flu shot include:    soreness, redness, or swelling where the shot was given     hoarseness   sore, red or itchy eyes   cough   fever   aches   headache   itching   fatigue  If these problems occur, they usually begin soon after the shot and last 1 or 2 days. More serious problems following a flu shot can include the following:     There may be a small increased risk of Guillain-Barré Syndrome (GBS) after inactivated flu vaccine. This risk has been estimated at 1 or 2 additional cases per million people vaccinated. This is much lower than the risk of severe complications from flu, which can be prevented by flu vaccine.  Young children who get the flu shot along with pneumococcal vaccine (PCV13) and/or DTaP vaccine at the same time might be slightly more likely to have a seizure caused by fever. Ask your doctor for more information. Tell your doctor if a child who is getting flu vaccine has ever had a seizure. Problems that could happen after any injected vaccine:      People sometimes faint after a medical procedure, including vaccination. Sitting or lying down for about 15 minutes can help prevent fainting, and injuries caused by a fall. Tell your doctor if you feel dizzy, or have vision changes or ringing in the ears.  Some people get severe pain in the shoulder and have difficulty moving the arm where a shot was given. This happens very rarely.  Any medication can cause a severe allergic reaction. Such reactions from a vaccine are very rare, estimated at about 1 in a million doses, and would happen within a few minutes to a few hours after the vaccination. As with any medicine, there is a very remote chance of a vaccine causing a serious injury or death. The safety of vaccines is always being monitored. For more information, visit: www.cdc.gov/vaccinesafety/    5. What if there is a serious reaction? What should I look for?  Look for anything that concerns you, such as signs of a severe allergic reaction, very high fever, or unusual behavior.     Signs of a severe allergic reaction can include hives, swelling of the face and throat, difficulty breathing, a fast heartbeat, dizziness, and weakness - usually within a few minutes to a few hours after the vaccination. What should I do?  If you think it is a severe allergic reaction or other emergency that cant wait, call 9-1-1 and get the person to the nearest hospital. Otherwise, call your doctor.  Reactions should be reported to the Vaccine Adverse Event Reporting System (VAERS). Your doctor should file this report, or you can do it yourself through  the VAERS web site at www.vaers. [facility].gov, or by calling 6-615.285.1757. VAERS does not give medical advice. 6. The National Vaccine Injury Compensation Program    The [facility] Vaccine Injury Compensation Program (VICP) is a federal program that was created to compensate people who may have been injured by certain vaccines. Persons who believe they may have been injured by a vaccine can learn about the program and about filing a claim by calling 8-580.241.9840 or visiting the vip.com website at www.[facility].gov/vaccinecompensation. There is a time limit to file a claim for compensation. 7. How can I learn more?  Ask your healthcare provider. He or she can give you the vaccine package insert or suggest other sources of information.  Call your local or state health department.  Contact the Centers for Disease Control and Prevention (CDC):  - Call 3-200.781.7958 (1-800-CDC-INFO) or  - Visit CDCs website at www.cdc.gov/flu    Vaccine Information Statement   Inactivated Influenza Vaccine   8/7/2015  42 LIZ Shonda Shelley [id number]    Department of Health and Human Services  Centers for Disease Control and Prevention    Office Use Only

## 2018-11-08 DIAGNOSIS — K22.719 BARRETT'S ESOPHAGUS WITH DYSPLASIA: ICD-10-CM

## 2018-11-08 DIAGNOSIS — E78.00 HIGH CHOLESTEROL: ICD-10-CM

## 2018-11-08 DIAGNOSIS — R11.0 NAUSEA: ICD-10-CM

## 2018-11-08 DIAGNOSIS — I10 ESSENTIAL HYPERTENSION: ICD-10-CM

## 2018-11-08 RX ORDER — ROSUVASTATIN CALCIUM 10 MG/1
10 TABLET, COATED ORAL
Qty: 90 TAB | Refills: 3 | Status: SHIPPED | OUTPATIENT
Start: 2018-11-08 | End: 2019-04-19 | Stop reason: SDUPTHER

## 2018-11-08 RX ORDER — OMEPRAZOLE 20 MG/1
20 CAPSULE, DELAYED RELEASE ORAL DAILY
Qty: 90 CAP | Refills: 3 | Status: SHIPPED | OUTPATIENT
Start: 2018-11-08 | End: 2019-10-27 | Stop reason: SDUPTHER

## 2018-11-08 RX ORDER — OLMESARTAN MEDOXOMIL AND HYDROCHLOROTHIAZIDE 40/12.5 40; 12.5 MG/1; MG/1
1 TABLET ORAL DAILY
Qty: 30 TAB | Refills: 1 | Status: SHIPPED | OUTPATIENT
Start: 2018-11-08 | End: 2018-12-05 | Stop reason: SDUPTHER

## 2018-11-25 ENCOUNTER — HOSPITAL ENCOUNTER (OUTPATIENT)
Dept: MRI IMAGING | Age: 68
Discharge: HOME | End: 2018-11-25
Attending: SPECIALIST
Payer: MEDICARE

## 2018-11-25 DIAGNOSIS — M51.36 DISC DEGENERATION, LUMBAR: ICD-10-CM

## 2018-11-25 PROCEDURE — 72148 MRI LUMBAR SPINE W/O DYE: CPT

## 2018-12-05 DIAGNOSIS — I10 ESSENTIAL HYPERTENSION: ICD-10-CM

## 2018-12-05 RX ORDER — OLMESARTAN MEDOXOMIL AND HYDROCHLOROTHIAZIDE 40/12.5 40; 12.5 MG/1; MG/1
1 TABLET ORAL DAILY
Qty: 30 TAB | Refills: 1 | Status: SHIPPED | OUTPATIENT
Start: 2018-12-05 | End: 2019-02-20 | Stop reason: SDUPTHER

## 2018-12-05 NOTE — TELEPHONE ENCOUNTER
PCP: Elida Auguste MD    Last appt: 10/25/2018  Future Appointments   Date Time Provider Christi Moseley   12/14/2018  9:45 AM Appa Cee Parker MD [facility] 87       Requested Prescriptions     Pending Prescriptions Disp Refills    olmesartan-hydroCHLOROthiazide (BENICAR HCT) 40-12.5 mg per tablet 30 Tab 1     Sig: Take 1 Tab by mouth daily.

## 2018-12-07 DIAGNOSIS — G25.81 RESTLESS LEG: ICD-10-CM

## 2018-12-07 DIAGNOSIS — Z00.00 MEDICARE ANNUAL WELLNESS VISIT, SUBSEQUENT: ICD-10-CM

## 2018-12-07 DIAGNOSIS — M54.42 CHRONIC MIDLINE LOW BACK PAIN WITH LEFT-SIDED SCIATICA: ICD-10-CM

## 2018-12-07 DIAGNOSIS — G89.29 CHRONIC MIDLINE LOW BACK PAIN WITH LEFT-SIDED SCIATICA: ICD-10-CM

## 2018-12-07 RX ORDER — GABAPENTIN 100 MG/1
200 CAPSULE ORAL
Qty: 60 CAP | Refills: 1 | Status: SHIPPED | OUTPATIENT
Start: 2018-12-07 | End: 2018-12-14 | Stop reason: ALTCHOICE

## 2018-12-07 NOTE — TELEPHONE ENCOUNTER
PCP: Cristiano Roman MD    Last appt: 10/25/2018  Future Appointments   Date Time Provider Christi Moseley   2018  9:45 AM [facility]       Requested Prescriptions     Pending Prescriptions Disp Refills    gabapentin (NEURONTIN) 100 mg capsule 60 Cap 1     Sig: Take 2 Caps by mouth nightly.     varicella-zoster recombinant, PF, (SHINGRIX, PF,) 50 mcg/0.5 mL susr injection 0.5 mL 1     Si.5mL by IntraMUSCular route once now and then repeat in 2-6 months

## 2018-12-14 ENCOUNTER — OFFICE VISIT (OUTPATIENT)
Dept: INTERNAL MEDICINE CLINIC | Age: 68
End: 2018-12-14

## 2018-12-14 VITALS
OXYGEN SATURATION: 99 % | HEIGHT: 64 IN | HEART RATE: 65 BPM | RESPIRATION RATE: 18 BRPM | DIASTOLIC BLOOD PRESSURE: 82 MMHG | TEMPERATURE: 97.8 F | SYSTOLIC BLOOD PRESSURE: 138 MMHG | BODY MASS INDEX: 28.68 KG/M2 | WEIGHT: 168 LBS

## 2018-12-14 DIAGNOSIS — G89.29 CHRONIC MIDLINE LOW BACK PAIN WITHOUT SCIATICA: ICD-10-CM

## 2018-12-14 DIAGNOSIS — F51.01 PRIMARY INSOMNIA: ICD-10-CM

## 2018-12-14 DIAGNOSIS — G47.30 SLEEP APNEA, UNSPECIFIED TYPE: ICD-10-CM

## 2018-12-14 DIAGNOSIS — I10 ESSENTIAL HYPERTENSION: Primary | ICD-10-CM

## 2018-12-14 DIAGNOSIS — Z00.00 MEDICARE ANNUAL WELLNESS VISIT, SUBSEQUENT: ICD-10-CM

## 2018-12-14 DIAGNOSIS — Z23 ENCOUNTER FOR IMMUNIZATION: ICD-10-CM

## 2018-12-14 DIAGNOSIS — M54.50 CHRONIC MIDLINE LOW BACK PAIN WITHOUT SCIATICA: ICD-10-CM

## 2018-12-14 DIAGNOSIS — M54.42 CHRONIC BILATERAL LOW BACK PAIN WITH BILATERAL SCIATICA: ICD-10-CM

## 2018-12-14 DIAGNOSIS — E78.00 HIGH CHOLESTEROL: ICD-10-CM

## 2018-12-14 DIAGNOSIS — M54.41 CHRONIC BILATERAL LOW BACK PAIN WITH BILATERAL SCIATICA: ICD-10-CM

## 2018-12-14 DIAGNOSIS — G25.81 RESTLESS LEG: ICD-10-CM

## 2018-12-14 DIAGNOSIS — G89.29 CHRONIC BILATERAL LOW BACK PAIN WITH BILATERAL SCIATICA: ICD-10-CM

## 2018-12-14 RX ORDER — TRAMADOL HYDROCHLORIDE 50 MG/1
50 TABLET ORAL
Qty: 30 TAB | Refills: 1 | Status: SHIPPED | OUTPATIENT
Start: 2018-12-14 | End: 2019-04-19 | Stop reason: SDUPTHER

## 2018-12-14 RX ORDER — ROPINIROLE 0.5 MG/1
0.5 TABLET, FILM COATED ORAL
Qty: 30 TAB | Refills: 0 | Status: SHIPPED | OUTPATIENT
Start: 2018-12-14 | End: 2019-01-03 | Stop reason: SDUPTHER

## 2018-12-14 NOTE — PROGRESS NOTES
Reviewed record in preparation for visit and have obtained necessary documentation. Identified pt with two pt identifiers(name and ). Chief Complaint   Patient presents with    Back Pain    Sleep Problem       Health Maintenance Due   Topic Date Due    Shingles Vaccine (1 of 2) 2001    Pneumococcal Vaccine (2 of 2 - PPSV23) 10/27/2018    Annual Well Visit  10/28/2018       Ms. Danika Frances has a reminder for a \"due or due soon\" health maintenance. I have asked that she discuss this further with her primary care provider for follow-up on this health maintenance. Coordination of Care Questionnaire:  :     1) Have you been to an emergency room, urgent care clinic since your last visit? no   Hospitalized since your last visit? no             2) Have you seen or consulted any other health care providers outside of [address] [place] [address] since your last visit? no  (Include any pap smears or colon screenings in this section.)    3) In the event something were to happen to you and you were unable to speak on your behalf, do you have an Advance Directive/ Living Will in place stating your wishes? NO    Do you have an Advance Directive on file? no    4) Are you interested in receiving information on Advance Directives? NO    Patient is accompanied by self I have received verbal consent from Jonn Moore to discuss any/all medical information while they are present in the room.

## 2018-12-14 NOTE — PATIENT INSTRUCTIONS
Stop gabapentin and start requip for restless leg   Medicare Wellness Visit, Female     The best way to live healthy is to have a lifestyle where you eat a well-balanced diet, exercise regularly, limit alcohol use, and quit all forms of tobacco/nicotine, if applicable. Regular preventive services are another way to keep healthy. Preventive services (vaccines, screening tests, monitoring & exams) can help personalize your care plan, which helps you manage your own care. Screening tests can find health problems at the earliest stages, when they are easiest to treat. [facility] follows the current, evidence-based guidelines published by the [facility] and [employer] Jostin Wolff (USPSTF) when recommending preventive services for our patients. Because we follow these guidelines, sometimes recommendations change over time as research supports it. (For example, mammograms used to be recommended annually. Even though Medicare will still pay for an annual mammogram, the newer guidelines recommend a mammogram every two years for women of average risk.)  Of course, you and your doctor may decide to screen more often for some diseases, based on your risk and your health status. Preventive services for you include:  - Medicare offers their members a free annual wellness visit, which is time for you and your primary care provider to discuss and plan for your preventive service needs. Take advantage of this benefit every year!  -All adults over the age of 72 should receive the recommended pneumonia vaccines. Current USPSTF guidelines recommend a series of two vaccines for the best pneumonia protection.   -All adults should have a flu vaccine yearly and a tetanus vaccine every 10 years. All adults age 61 and older should receive a shingles vaccine once in their lifetime.    -A bone mass density test is recommended when a woman turns 65 to screen for osteoporosis. This test is only recommended one time, as a screening. Some providers will use this same test as a disease monitoring tool if you already have osteoporosis. -All adults age 38-68 who are overweight should have a diabetes screening test once every three years.   -Other screening tests and preventive services for persons with diabetes include: an eye exam to screen for diabetic retinopathy, a kidney function test, a foot exam, and stricter control over your cholesterol.   -Cardiovascular screening for adults with routine risk involves an electrocardiogram (ECG) at intervals determined by your doctor.   -Colorectal cancer screenings should be done for adults age 54-65 with no increased risk factors for colorectal cancer. There are a number of acceptable methods of screening for this type of cancer. Each test has its own benefits and drawbacks. Discuss with your doctor what is most appropriate for you during your annual wellness visit. The different tests include: colonoscopy (considered the best screening method), a fecal occult blood test, a fecal DNA test, and sigmoidoscopy. -Breast cancer screenings are recommended every other year for women of normal risk, age 54-69.  -Cervical cancer screenings for women over age 72 are only recommended with certain risk factors.   -All adults born between [place] should be screened once for Hepatitis C.      Here is a list of your current Health Maintenance items (your personalized list of preventive services) with a due date:  Health Maintenance Due   Topic Date Due    Shingles Vaccine (1 of 2) 07/31/2001

## 2018-12-14 NOTE — PROGRESS NOTES
CC: Back Pain and Sleep Problem      HPI:    She is a 80 y.o. female who presents for follow up     Recall  Patient has suffered from back pain since the summer     \"Started having pronounced back pain in late June 2018 - pain starts in low back and radiates to the leg - crushing pain. Patient has a hx of back stenosis     Had MRI of back - IMPRESSION:     Mild to moderate L1-2 with mild L2-3, L3-4, and L4-5 central stenosis. Multilevel neural foraminal narrowing as described above. Due for dexa in 10/2018     Dr Asha Sinha - back and spine specialist  Had injections in the back and was helpful scheduled for another injection in January    Has been taking tramadol only when pain is severe    Restless leg is bothersome - took gabapentin 200mg at bedtime which helps with restless leg but causes too much sedation. Not taking daily      Teague's esophagus: follows with  Dr Bolanos/ sree. On PPI PRN   Does complain of intermittent nausea and plans to follow up with Dr Virgilio Dumas  Patient had EGD which did not show dysplasia, repeat endoscopy in 5 years   Had gastritis  Taking omeprazile     HTN: BP is excellent currently on benicar ( valsartan recalled so changed) - patient denies CP and dyspnea        Insomnia: tried [place] and elavil in the past. Last visit patient prescribed belsomra and works but cost is an issue. ROS:  10 systems reviewed and negative other than HPI     Past Medical History:   Diagnosis Date    Teague esophagus     small segment in 2014 Dr Adonay Granger Bradycardia     had negative stress test and nuclear imaging done in 2015    GERD (gastroesophageal reflux disease)     Glaucoma     H/O seasonal allergies     High cholesterol     Hypertension     NICOLÁS on CPAP     Osteopenia     Vertigo        Current Outpatient Medications on File Prior to Visit   Medication Sig Dispense Refill    olmesartan-hydroCHLOROthiazide (BENICAR HCT) 40-12.5 mg per tablet Take 1 Tab by mouth daily.  30 Tab 1    rosuvastatin (CRESTOR) 10 mg tablet Take 1 Tab by mouth nightly. 90 Tab 3    omeprazole (PRILOSEC) 20 mg capsule Take 1 Cap by mouth daily. 90 Cap 3    traMADol (ULTRAM) 50 mg tablet Take 1 Tab by mouth every twelve (12) hours as needed for Pain. Max Daily Amount: 100 mg. 30 Tab 1    cpap machine kit by Does Not Apply route. Pressure setting is 11 mm Hg  Diagnosis is NICOLÁS 1 Kit 0    albuterol (PROVENTIL HFA, VENTOLIN HFA, PROAIR HFA) 90 mcg/actuation inhaler Take 1 Puff by inhalation every four (4) hours as needed for Wheezing. 1 Inhaler 3    guaiFENesin ER (MUCINEX) 600 mg ER tablet Take 600 mg by mouth daily as needed.  fluticasone (FLONASE) 50 mcg/actuation nasal spray 2 Sprays by Both Nostrils route daily as needed.  aspirin 81 mg chewable tablet Take 81 mg by mouth daily.  omega-3 fatty acids-vitamin e (FISH OIL) 1,000 mg cap Take 3 Caps by mouth daily.  calcium carbonate (OS-VJ) 500 mg calcium (1,250 mg) tablet Take 1 Tab by mouth daily.  varicella-zoster recombinant, PF, (SHINGRIX, PF,) 50 mcg/0.5 mL susr injection 0.5mL by IntraMUSCular route once now and then repeat in 2-6 months 0.5 mL 1     No current facility-administered medications on file prior to visit.         Past Surgical History:   Procedure Laterality Date    HX APPENDECTOMY      with jose    HX BUNIONECTOMY Bilateral     HX CATARACT REMOVAL Bilateral 2017    HX HYSTERECTOMY      HX OPEN CHOLECYSTECTOMY      with appy       Family History   Problem Relation Age of Onset    Colon Cancer Mother 76    Coronary Artery Disease Father 61    Breast Cancer Other         29's or 42's     Reviewed and no changes     Social History     Socioeconomic History    Marital status:      Spouse name: Not on file    Number of children: Not on file    Years of education: Not on file    Highest education level: Not on file   Social Needs    Financial resource strain: Not on file    Food insecurity - worry: Not on file    Food insecurity - inability: Not on file    Transportation needs - medical: Not on file    Transportation needs - non-medical: Not on file   Occupational History    Not on file   Tobacco Use    Smoking status: Never Smoker    Smokeless tobacco: Never Used   Substance and Sexual Activity    Alcohol use: No    Drug use: No    Sexual activity: Not Currently   Other Topics Concern    Not on file   Social History Narrative    Not on file            Visit Vitals  /82 (BP 1 Location: Right arm, BP Patient Position: Sitting)   Pulse 65   Temp 97.8 °F (36.6 °C) (Oral)   Resp 18   Ht 5' 4\" (1.626 m)   Wt 168 lb (76.2 kg)   SpO2 99%   BMI 28.84 kg/m²       Physical Examination:   General - Well appearing female  HEENT - PERRL, TM no erythema/opacification, normal nasal turbinates, oropharynx no erythema or exudate, MMM  Neck - supple, no bruits, no TMG, no LAD  Pulm - clear to auscultation bilaterally  Cardio - RRR, normal S1 S2, no murmur gallops or rubs  Abd - soft, nontender, no masses, no HSM  Extrem - no edema, +2 distal pulses  Psych - normal affect, appropriate mood    Lab Results   Component Value Date/Time    WBC 4.8 08/31/2018 03:44 PM    HGB 13.7 08/31/2018 03:44 PM    HCT 40.6 08/31/2018 03:44 PM    PLATELET 734 46/33/8105 03:44 PM    MCV 92 08/31/2018 03:44 PM     Lab Results   Component Value Date/Time    Sodium 141 08/31/2018 03:44 PM    Potassium 3.8 08/31/2018 03:44 PM    Chloride 100 08/31/2018 03:44 PM    CO2 21 08/31/2018 03:44 PM    Anion gap 8 10/26/2015 06:31 PM    Glucose 92 08/31/2018 03:44 PM    BUN 16 08/31/2018 03:44 PM    Creatinine 0.68 08/31/2018 03:44 PM    BUN/Creatinine ratio 24 08/31/2018 03:44 PM    GFR est  08/31/2018 03:44 PM    GFR est non-AA 91 08/31/2018 03:44 PM    Calcium 9.9 08/31/2018 03:44 PM     Lab Results   Component Value Date/Time    Cholesterol, total 168 08/31/2018 03:44 PM    HDL Cholesterol 57 08/31/2018 03:44 PM    LDL, calculated 80 08/31/2018 03:44 PM    VLDL, calculated 31 08/31/2018 03:44 PM    Triglyceride 154 (H) 08/31/2018 03:44 PM     Lab Results   Component Value Date/Time    TSH 1.340 03/23/2017 11:06 AM     No results found for: PSA, PSA2, PSAR1, Sandra Damaso, PSAR3, [id number], [id number], PSALT  Lab Results   Component Value Date/Time    Hemoglobin A1c 5.8 (H) 08/31/2018 03:44 PM     Lab Results   Component Value Date/Time    VITAMIN D, 25-HYDROXY 61.2 03/23/2017 11:06 AM       Lab Results   Component Value Date/Time    ALT (SGPT) 17 08/31/2018 03:44 PM    AST (SGOT) 25 08/31/2018 03:44 PM    Alk. phosphatase 48 08/31/2018 03:44 PM    Bilirubin, direct 0.11 03/23/2017 11:06 AM    Bilirubin, total <0.2 08/31/2018 03:44 PM           Assessment/Plan:    1. Essential hypertension  - blood pressure is well controlled on benicar, continue current regimen  - METABOLIC PANEL, COMPREHENSIVE    2. Sleep apnea, unspecified type  - on CPAP    3. Chronic bilateral low back pain with bilateral sciatica  - had MRI of back with mild to moderate stenosis and takes tramadol only for severe pain    4. High cholesterol  - on crestor last LDL 80   - METABOLIC PANEL, COMPREHENSIVE    5. Insomnia: belsomra is helpful but high copay / would like refill - prescribed belsomra    6. Restless leg syndrome: prescribed requip   Stop gabapentin causes too much sedation    7. GERD: on PPI ( barretts )  Follow-up Disposition:  Return in about 3 months (around 3/14/2019) for restless leg and insomnia and cholesterol. Charles Davis MD  This is the Subsequent Medicare Annual Wellness Exam, performed 12 months or more after the Initial AWV or the last Subsequent AWV    I have reviewed the patient's medical history in detail and updated the computerized patient record.      History     Past Medical History:   Diagnosis Date    Teague esophagus     small segment in 2014 Dr Arsalan Ward    Bradycardia     had negative stress test and nuclear imaging done in 2015  GERD (gastroesophageal reflux disease)     Glaucoma     H/O seasonal allergies     High cholesterol     Hypertension     NICOLÁS on CPAP     Osteopenia     Vertigo       Past Surgical History:   Procedure Laterality Date    HX APPENDECTOMY      with jose    HX BUNIONECTOMY Bilateral     HX CATARACT REMOVAL Bilateral 2017    HX HYSTERECTOMY      HX OPEN CHOLECYSTECTOMY      with appy     Current Outpatient Medications   Medication Sig Dispense Refill    rOPINIRole (REQUIP) 0.5 mg tablet Take 1 Tab by mouth nightly. 30 Tab 0    suvorexant (BELSOMRA) 10 mg tablet Take 1 Tab by mouth nightly as needed for Insomnia. Max Daily Amount: 10 mg. 30 Tab 1    traMADol (ULTRAM) 50 mg tablet Take 1 Tab by mouth every twelve (12) hours as needed for Pain. Max Daily Amount: 100 mg. 30 Tab 1    olmesartan-hydroCHLOROthiazide (BENICAR HCT) 40-12.5 mg per tablet Take 1 Tab by mouth daily. 30 Tab 1    rosuvastatin (CRESTOR) 10 mg tablet Take 1 Tab by mouth nightly. 90 Tab 3    omeprazole (PRILOSEC) 20 mg capsule Take 1 Cap by mouth daily. 90 Cap 3    cpap machine kit by Does Not Apply route. Pressure setting is 11 mm Hg  Diagnosis is NICOLÁS 1 Kit 0    albuterol (PROVENTIL HFA, VENTOLIN HFA, PROAIR HFA) 90 mcg/actuation inhaler Take 1 Puff by inhalation every four (4) hours as needed for Wheezing. 1 Inhaler 3    guaiFENesin ER (MUCINEX) 600 mg ER tablet Take 600 mg by mouth daily as needed.  fluticasone (FLONASE) 50 mcg/actuation nasal spray 2 Sprays by Both Nostrils route daily as needed.  aspirin 81 mg chewable tablet Take 81 mg by mouth daily.  omega-3 fatty acids-vitamin e (FISH OIL) 1,000 mg cap Take 3 Caps by mouth daily.  calcium carbonate (OS-VJ) 500 mg calcium (1,250 mg) tablet Take 1 Tab by mouth daily.       varicella-zoster recombinant, PF, (SHINGRIX, PF,) 50 mcg/0.5 mL susr injection 0.5mL by IntraMUSCular route once now and then repeat in 2-6 months 0.5 mL 1     Allergies   Allergen Reactions    Latex Rash    Codeine Nausea and Vomiting    Thiuram Analogues Rash     Family History   Problem Relation Age of Onset    Colon Cancer Mother 76    Coronary Artery Disease Father 59    Breast Cancer Other         29's or 42's     Social History     Tobacco Use    Smoking status: Never Smoker    Smokeless tobacco: Never Used   Substance Use Topics    Alcohol use: No     Patient Active Problem List   Diagnosis Code    Chest pain R07.9    Hypertension I10    High cholesterol E78.00    Osteopenia M85.80    Sleep apnea G47.30    Primary open angle glaucoma of right eye, mild stage H40.1111    Combined forms of age-related cataract of right eye H25.811    Primary open angle glaucoma of left eye, mild stage H40.1121    Combined forms of age-related cataract of left eye H25.812    Right posterior capsular opacification H26.491    Left posterior capsular opacification H26.492       Depression Risk Factor Screening:     PHQ over the last two weeks 12/14/2018   Little interest or pleasure in doing things Several days   Feeling down, depressed, irritable, or hopeless Not at all   Total Score PHQ 2 1     Alcohol Risk Factor Screening: You do not drink alcohol or very rarely. Functional Ability and Level of Safety:   Hearing Loss  Hearing is good. Activities of Daily Living  The home contains: no safety equipment. Patient does total self care    Fall Risk  Fall Risk Assessment, last 12 mths 12/14/2018   Able to walk? Yes   Fall in past 12 months?  No       Abuse Screen  Patient is not abused    Cognitive Screening   Evaluation of Cognitive Function:  Has your family/caregiver stated any concerns about your memory: no  Normal    Patient Care Team   Patient Care Team:  Emily Leahy MD as PCP - General (Internal Medicine)  Ritu Ponce MD as Consulting Provider (Gastroenterology)    Assessment/Plan   Education and counseling provided:  Are appropriate based on today's review and evaluation    Diagnoses and all orders for this visit:    1. Essential hypertension  -     METABOLIC PANEL, COMPREHENSIVE    2. Sleep apnea, unspecified type    3. Chronic bilateral low back pain with bilateral sciatica  -     METABOLIC PANEL, COMPREHENSIVE    4. High cholesterol  -     METABOLIC PANEL, COMPREHENSIVE    5. Restless leg  -     rOPINIRole (REQUIP) 0.5 mg tablet; Take 1 Tab by mouth nightly. 6. Primary insomnia  -     suvorexant (BELSOMRA) 10 mg tablet; Take 1 Tab by mouth nightly as needed for Insomnia. Max Daily Amount: 10 mg.    7. Chronic midline low back pain without sciatica  -     traMADol (ULTRAM) 50 mg tablet; Take 1 Tab by mouth every twelve (12) hours as needed for Pain.  Max Daily Amount: 100 mg.    8. Encounter for immunization  -     ADMIN PNEUMOCOCCAL VACCINE  -     PNEUMOCOCCAL POLYSACCHARIDE VACCINE, 23-VALENT, ADULT OR IMMUNOSUPPRESSED PT DOSE,    9. Medicare annual wellness visit, subsequent        Health Maintenance Due   Topic Date Due    Shingrix Vaccine Age 49> (1 of 2) 07/31/2001    Pneumococcal 65+ Low/Medium Risk (2 of 2 - PPSV23) 10/27/2018    MEDICARE YEARLY EXAM  10/28/2018

## 2018-12-15 LAB
ALBUMIN SERPL-MCNC: 4.6 G/DL (ref 3.6–4.8)
ALBUMIN/GLOB SERPL: 1.9 {RATIO} (ref 1.2–2.2)
ALP SERPL-CCNC: 52 IU/L (ref 39–117)
ALT SERPL-CCNC: 25 IU/L (ref 0–32)
AST SERPL-CCNC: 29 IU/L (ref 0–40)
BILIRUB SERPL-MCNC: 0.4 MG/DL (ref 0–1.2)
BUN SERPL-MCNC: 16 MG/DL (ref 8–27)
BUN/CREAT SERPL: 23 (ref 12–28)
CALCIUM SERPL-MCNC: 9.7 MG/DL (ref 8.7–10.3)
CHLORIDE SERPL-SCNC: 103 MMOL/L (ref 96–106)
CO2 SERPL-SCNC: 27 MMOL/L (ref 20–29)
CREAT SERPL-MCNC: 0.7 MG/DL (ref 0.57–1)
GLOBULIN SER CALC-MCNC: 2.4 G/DL (ref 1.5–4.5)
GLUCOSE SERPL-MCNC: 98 MG/DL (ref 65–99)
POTASSIUM SERPL-SCNC: 4 MMOL/L (ref 3.5–5.2)
PROT SERPL-MCNC: 7 G/DL (ref 6–8.5)
SODIUM SERPL-SCNC: 143 MMOL/L (ref 134–144)

## 2018-12-31 RX ORDER — GABAPENTIN 100 MG/1
100 CAPSULE ORAL 2 TIMES DAILY
Qty: 180 CAP | Refills: 1 | Status: SHIPPED | OUTPATIENT
Start: 2018-12-31 | End: 2019-04-19 | Stop reason: ALTCHOICE

## 2018-12-31 RX ORDER — GABAPENTIN 100 MG/1
CAPSULE ORAL
COMMUNITY
Start: 2018-10-08 | End: 2018-12-31 | Stop reason: SDUPTHER

## 2018-12-31 NOTE — TELEPHONE ENCOUNTER
PCP: Zandra Malave MD    Last appt: 12/14/2018  No future appointments. Requested Prescriptions     Pending Prescriptions Disp Refills    gabapentin (NEURONTIN) 100 mg capsule 180 Cap 1     Sig: Take 1 Cap by mouth two (2) times a day.

## 2019-01-03 DIAGNOSIS — G25.81 RESTLESS LEG: ICD-10-CM

## 2019-01-03 RX ORDER — ROPINIROLE 0.5 MG/1
0.5 TABLET, FILM COATED ORAL
Qty: 90 TAB | Refills: 2 | Status: SHIPPED | OUTPATIENT
Start: 2019-01-03 | End: 2019-04-19

## 2019-01-03 NOTE — TELEPHONE ENCOUNTER
PCP: Radha Lou MD    Last appt: 12/14/2018  No future appointments. Requested Prescriptions     Pending Prescriptions Disp Refills    rOPINIRole (REQUIP) 0.5 mg tablet 90 Tab 2     Sig: Take 1 Tab by mouth nightly.

## 2019-02-20 DIAGNOSIS — I10 ESSENTIAL HYPERTENSION: ICD-10-CM

## 2019-02-20 RX ORDER — OLMESARTAN MEDOXOMIL AND HYDROCHLOROTHIAZIDE 40/12.5 40; 12.5 MG/1; MG/1
TABLET ORAL
Qty: 30 TAB | Refills: 1 | Status: SHIPPED | OUTPATIENT
Start: 2019-02-20 | End: 2019-04-19 | Stop reason: SDUPTHER

## 2019-04-08 ENCOUNTER — TELEPHONE (OUTPATIENT)
Dept: INTERNAL MEDICINE CLINIC | Age: 69
End: 2019-04-08

## 2019-04-08 NOTE — TELEPHONE ENCOUNTER
#[phone] pt states she needs a 10 or 11 am appt sooner than what is available for medicare yearly wellness visit (cpe?)  Please call to schedule pt.

## 2019-04-19 ENCOUNTER — OFFICE VISIT (OUTPATIENT)
Dept: INTERNAL MEDICINE CLINIC | Age: 69
End: 2019-04-19

## 2019-04-19 VITALS
WEIGHT: 173 LBS | BODY MASS INDEX: 29.53 KG/M2 | OXYGEN SATURATION: 99 % | RESPIRATION RATE: 18 BRPM | HEART RATE: 53 BPM | SYSTOLIC BLOOD PRESSURE: 127 MMHG | DIASTOLIC BLOOD PRESSURE: 74 MMHG | HEIGHT: 64 IN | TEMPERATURE: 97.8 F

## 2019-04-19 DIAGNOSIS — G25.81 RESTLESS LEG: ICD-10-CM

## 2019-04-19 DIAGNOSIS — Z13.1 SCREENING FOR DIABETES MELLITUS: ICD-10-CM

## 2019-04-19 DIAGNOSIS — E78.00 HIGH CHOLESTEROL: ICD-10-CM

## 2019-04-19 DIAGNOSIS — M54.50 CHRONIC MIDLINE LOW BACK PAIN WITHOUT SCIATICA: ICD-10-CM

## 2019-04-19 DIAGNOSIS — I10 ESSENTIAL HYPERTENSION: Primary | ICD-10-CM

## 2019-04-19 DIAGNOSIS — R73.03 PRE-DIABETES: ICD-10-CM

## 2019-04-19 DIAGNOSIS — R63.5 WEIGHT GAIN: ICD-10-CM

## 2019-04-19 DIAGNOSIS — G89.29 CHRONIC MIDLINE LOW BACK PAIN WITHOUT SCIATICA: ICD-10-CM

## 2019-04-19 RX ORDER — GABAPENTIN 300 MG/1
300 CAPSULE ORAL
Qty: 90 CAP | Refills: 3 | Status: SHIPPED | OUTPATIENT
Start: 2019-04-19 | End: 2019-08-09 | Stop reason: SDUPTHER

## 2019-04-19 RX ORDER — GUAIFENESIN 600 MG/1
600 TABLET, EXTENDED RELEASE ORAL
Qty: 90 TAB | Refills: 3 | Status: SHIPPED | OUTPATIENT
Start: 2019-04-19 | End: 2020-09-01 | Stop reason: SINTOL

## 2019-04-19 RX ORDER — TRAMADOL HYDROCHLORIDE 50 MG/1
100 TABLET ORAL
Qty: 60 TAB | Refills: 1 | Status: SHIPPED | OUTPATIENT
Start: 2019-04-19 | End: 2019-05-19

## 2019-04-19 RX ORDER — OLMESARTAN MEDOXOMIL AND HYDROCHLOROTHIAZIDE 40/12.5 40; 12.5 MG/1; MG/1
TABLET ORAL
Qty: 90 TAB | Refills: 3 | Status: SHIPPED | OUTPATIENT
Start: 2019-04-19 | End: 2020-03-24 | Stop reason: SDUPTHER

## 2019-04-19 RX ORDER — ROSUVASTATIN CALCIUM 10 MG/1
10 TABLET, COATED ORAL
Qty: 90 TAB | Refills: 3 | Status: SHIPPED | OUTPATIENT
Start: 2019-04-19 | End: 2020-03-24 | Stop reason: SDUPTHER

## 2019-04-19 RX ORDER — FLUTICASONE PROPIONATE 50 MCG
2 SPRAY, SUSPENSION (ML) NASAL
Qty: 1 BOTTLE | Refills: 11 | Status: SHIPPED | OUTPATIENT
Start: 2019-04-19 | End: 2020-03-24 | Stop reason: SDUPTHER

## 2019-04-19 NOTE — PROGRESS NOTES
CC: Medication Evaluation      HPI:    She is a 80 y.o. female who presents for follow up         \"Started having pronounced back pain in late June 2018 - pain starts in low back and radiates to the leg - crushing pain. \"     Patient  thensaw Dr Gaby Trevizo and had procedure \" sever the nerve\" and had great success. Feels better. Roderick Malin been rarely taking tramadol - took it Monday after yard work. Requesting a refill for PRN use    Restless leg is bothersome - stopped gabapentin and started it again. Tried requip and did not like it. Teague's esophagus: follows with  Dr Bolanos/ sree. On PPI PRN      HTN: BP is 127/74 on benicar         Insomnia: tried [place] and elavil in the past. Last visit patient prescribed belsomra and works but cost is an issue. ROS:  10 systems reviewed and negative other than HPI     Past Medical History:   Diagnosis Date    Teague esophagus     small segment in 2014 Dr Vandana Celis Bradycardia     had negative stress test and nuclear imaging done in 2015    GERD (gastroesophageal reflux disease)     Glaucoma     H/O seasonal allergies     High cholesterol     Hypertension     NICOLÁS on CPAP     Osteopenia     Vertigo        Current Outpatient Medications on File Prior to Visit   Medication Sig Dispense Refill    olmesartan-hydroCHLOROthiazide (BENICAR HCT) 40-12.5 mg per tablet TAKE 1 TABLET BY MOUTH EVERY DAY 30 Tab 1    rOPINIRole (REQUIP) 0.5 mg tablet Take 1 Tab by mouth nightly. 90 Tab 2    gabapentin (NEURONTIN) 100 mg capsule Take 1 Cap by mouth two (2) times a day. 180 Cap 1    suvorexant (BELSOMRA) 10 mg tablet Take 1 Tab by mouth nightly as needed for Insomnia. Max Daily Amount: 10 mg. 30 Tab 1    traMADol (ULTRAM) 50 mg tablet Take 1 Tab by mouth every twelve (12) hours as needed for Pain.  Max Daily Amount: 100 mg. 30 Tab 1    varicella-zoster recombinant, PF, (SHINGRIX, PF,) 50 mcg/0.5 mL susr injection 0.5mL by IntraMUSCular route once now and then repeat in 2-6 months 0.5 mL 1    rosuvastatin (CRESTOR) 10 mg tablet Take 1 Tab by mouth nightly. 90 Tab 3    omeprazole (PRILOSEC) 20 mg capsule Take 1 Cap by mouth daily. 90 Cap 3    cpap machine kit by Does Not Apply route. Pressure setting is 11 mm Hg  Diagnosis is NICOLÁS 1 Kit 0    albuterol (PROVENTIL HFA, VENTOLIN HFA, PROAIR HFA) 90 mcg/actuation inhaler Take 1 Puff by inhalation every four (4) hours as needed for Wheezing. 1 Inhaler 3    guaiFENesin ER (MUCINEX) 600 mg ER tablet Take 600 mg by mouth daily as needed.  fluticasone (FLONASE) 50 mcg/actuation nasal spray 2 Sprays by Both Nostrils route daily as needed.  aspirin 81 mg chewable tablet Take 81 mg by mouth daily.  omega-3 fatty acids-vitamin e (FISH OIL) 1,000 mg cap Take 3 Caps by mouth daily.  calcium carbonate (OS-VJ) 500 mg calcium (1,250 mg) tablet Take 1 Tab by mouth daily. No current facility-administered medications on file prior to visit.         Past Surgical History:   Procedure Laterality Date    HX APPENDECTOMY      with jose    HX BUNIONECTOMY Bilateral     HX CATARACT REMOVAL Bilateral 2017    HX HYSTERECTOMY      HX OPEN CHOLECYSTECTOMY      with appy       Family History   Problem Relation Age of Onset    Colon Cancer Mother 76    Coronary Artery Disease Father 61    Breast Cancer Other         29's or 42's     Reviewed and no changes     Social History     Socioeconomic History    Marital status:      Spouse name: Not on file    Number of children: Not on file    Years of education: Not on file    Highest education level: Not on file   Occupational History    Not on file   Social Needs    Financial resource strain: Not on file    Food insecurity:     Worry: Not on file     Inability: Not on file    Transportation needs:     Medical: Not on file     Non-medical: Not on file   Tobacco Use    Smoking status: Never Smoker    Smokeless tobacco: Never Used   Substance and Sexual Activity    Alcohol use: No    Drug use: No    Sexual activity: Not Currently   Lifestyle    Physical activity:     Days per week: Not on file     Minutes per session: Not on file    Stress: Not on file   Relationships    Social connections:     Talks on phone: Not on file     Gets together: Not on file     Attends Protestant service: Not on file     Active member of club or organization: Not on file     Attends meetings of clubs or organizations: Not on file     Relationship status: Not on file    Intimate partner violence:     Fear of current or ex partner: Not on file     Emotionally abused: Not on file     Physically abused: Not on file     Forced sexual activity: Not on file   Other Topics Concern    Not on file   Social History Narrative    Not on file            Visit Vitals  /74 (BP 1 Location: Right arm, BP Patient Position: Sitting)   Pulse (!) 53   Temp 97.8 °F (36.6 °C) (Oral)   Resp 18   Ht 5' 4\" (1.626 m)   Wt 173 lb (78.5 kg)   SpO2 99%   BMI 29.70 kg/m²       Physical Examination:   General - Well appearing female  HEENT - PERRL, TM no erythema/opacification, normal nasal turbinates, oropharynx no erythema or exudate, MMM  Neck - supple, no bruits, no TMG, no LAD  Pulm - clear to auscultation bilaterally  Cardio - RRR, normal S1 S2, no murmur gallops or rubs  Abd - soft, nontender, no masses, no HSM  Extrem - no edema, +2 distal pulses  Psych - normal affect, appropriate mood    Lab Results   Component Value Date/Time    WBC 4.8 08/31/2018 03:44 PM    HGB 13.7 08/31/2018 03:44 PM    HCT 40.6 08/31/2018 03:44 PM    PLATELET 932 39/36/7701 03:44 PM    MCV 92 08/31/2018 03:44 PM     Lab Results   Component Value Date/Time    Sodium 143 12/14/2018 11:18 AM    Potassium 4.0 12/14/2018 11:18 AM    Chloride 103 12/14/2018 11:18 AM    CO2 27 12/14/2018 11:18 AM    Anion gap 8 10/26/2015 06:31 PM    Glucose 98 12/14/2018 11:18 AM    BUN 16 12/14/2018 11:18 AM    Creatinine 0.70 12/14/2018 11:18 AM    BUN/Creatinine ratio 23 12/14/2018 11:18 AM    GFR est  12/14/2018 11:18 AM    GFR est non-AA 90 12/14/2018 11:18 AM    Calcium 9.7 12/14/2018 11:18 AM     Lab Results   Component Value Date/Time    Cholesterol, total 168 08/31/2018 03:44 PM    HDL Cholesterol 57 08/31/2018 03:44 PM    LDL, calculated 80 08/31/2018 03:44 PM    VLDL, calculated 31 08/31/2018 03:44 PM    Triglyceride 154 (H) 08/31/2018 03:44 PM     Lab Results   Component Value Date/Time    TSH 1.340 03/23/2017 11:06 AM     No results found for: PSA, PSA2, Napolean Punch, [id number], [id number], PSALT  Lab Results   Component Value Date/Time    Hemoglobin A1c 5.8 (H) 08/31/2018 03:44 PM     Lab Results   Component Value Date/Time    VITAMIN D, 25-HYDROXY 61.2 03/23/2017 11:06 AM       Lab Results   Component Value Date/Time    ALT (SGPT) 25 12/14/2018 11:18 AM    AST (SGOT) 29 12/14/2018 11:18 AM    Alk. phosphatase 52 12/14/2018 11:18 AM    Bilirubin, direct 0.11 03/23/2017 11:06 AM    Bilirubin, total 0.4 12/14/2018 11:18 AM           Assessment/Plan:    1. Essential hypertension  - blood pressure is well controlled on benicar, continue current regimen  - METABOLIC PANEL, COMPREHENSIVE    2. Sleep apnea, unspecified type  - on CPAP encouraged compliance    3. Chronic bilateral low back pain with bilateral sciatica  - had MRI of back with mild to moderate stenosis and takes tramadol only for severe pain/ had a nerve block which has helped. Refilled tramadol today. UDS done today. Last taken tramadol Monday    4. High cholesterol  - on crestor last LDL 80       5. Insomnia: belsomra is helpful but high copay - uses it rarely due to cost    6. Restless leg syndrome: prescribed requip not effective resumed gabapentin     7.  GERD: on PPI ( barretts ), follow with GI PRN         Azalia Jauregui MD

## 2019-04-19 NOTE — PROGRESS NOTES
Reviewed record in preparation for visit and have obtained necessary documentation. Identified pt with two pt identifiers(name and ). Chief Complaint   Patient presents with    Medication Evaluation       Health Maintenance Due   Topic Date Due    Shingles Vaccine (1 of 2) 2001    Glaucoma Screening   2019       Ms. Trista Anderson has a reminder for a \"due or due soon\" health maintenance. I have asked that she discuss this further with her primary care provider for follow-up on this health maintenance. Coordination of Care Questionnaire:  :     1) Have you been to an emergency room, urgent care clinic since your last visit? no   Hospitalized since your last visit? no             2) Have you seen or consulted any other health care providers outside of [address] [place] [address] since your last visit? no  (Include any pap smears or colon screenings in this section.)    3) In the event something were to happen to you and you were unable to speak on your behalf, do you have an Advance Directive/ Living Will in place stating your wishes? NO    Do you have an Advance Directive on file? no    4) Are you interested in receiving information on Advance Directives? NO    Patient is accompanied by self I have received verbal consent from Hermila Castro to discuss any/all medical information while they are present in the room.

## 2019-04-20 LAB
ALBUMIN SERPL-MCNC: 4.5 G/DL (ref 3.6–4.8)
ALBUMIN/GLOB SERPL: 1.8 {RATIO} (ref 1.2–2.2)
ALP SERPL-CCNC: 52 IU/L (ref 39–117)
ALT SERPL-CCNC: 18 IU/L (ref 0–32)
AST SERPL-CCNC: 25 IU/L (ref 0–40)
BILIRUB SERPL-MCNC: 0.3 MG/DL (ref 0–1.2)
BUN SERPL-MCNC: 19 MG/DL (ref 8–27)
BUN/CREAT SERPL: 28 (ref 12–28)
CALCIUM SERPL-MCNC: 10.5 MG/DL (ref 8.7–10.3)
CHLORIDE SERPL-SCNC: 103 MMOL/L (ref 96–106)
CO2 SERPL-SCNC: 25 MMOL/L (ref 20–29)
CREAT SERPL-MCNC: 0.69 MG/DL (ref 0.57–1)
GLOBULIN SER CALC-MCNC: 2.5 G/DL (ref 1.5–4.5)
GLUCOSE SERPL-MCNC: 99 MG/DL (ref 65–99)
HBA1C MFR BLD: 6 % (ref 4.8–5.6)
IRON SATN MFR SERPL: 20 % (ref 15–55)
IRON SERPL-MCNC: 64 UG/DL (ref 27–139)
POTASSIUM SERPL-SCNC: 5.1 MMOL/L (ref 3.5–5.2)
PROT SERPL-MCNC: 7 G/DL (ref 6–8.5)
SODIUM SERPL-SCNC: 142 MMOL/L (ref 134–144)
T4 FREE SERPL-MCNC: 1.21 NG/DL (ref 0.82–1.77)
TIBC SERPL-MCNC: 322 UG/DL (ref 250–450)
TSH SERPL DL<=0.005 MIU/L-ACNC: 2.08 UIU/ML (ref 0.45–4.5)
UIBC SERPL-MCNC: 258 UG/DL (ref 118–369)

## 2019-04-26 LAB — DRUGS UR: NORMAL

## 2019-04-27 NOTE — PROGRESS NOTES
Prediabetes is stable continue with low sugar diet  Iron levels look good  Kidney , liver and thyroid look normal  Sent on my chart

## 2019-05-15 ENCOUNTER — HOSPITAL ENCOUNTER (OUTPATIENT)
Dept: MAMMOGRAPHY | Age: 69
Discharge: HOME | End: 2019-05-15
Attending: INTERNAL MEDICINE
Payer: MEDICARE

## 2019-05-15 DIAGNOSIS — Z12.31 VISIT FOR SCREENING MAMMOGRAM: ICD-10-CM

## 2019-05-15 PROCEDURE — 77067 SCR MAMMO BI INCL CAD: CPT

## 2019-06-12 DIAGNOSIS — F51.01 PRIMARY INSOMNIA: ICD-10-CM

## 2019-06-12 RX ORDER — SUVOREXANT 10 MG/1
TABLET, FILM COATED ORAL
Qty: 30 TAB | Refills: 1 | Status: SHIPPED | OUTPATIENT
Start: 2019-06-12 | End: 2019-06-13 | Stop reason: SDUPTHER

## 2019-06-13 DIAGNOSIS — F51.01 PRIMARY INSOMNIA: ICD-10-CM

## 2019-06-13 RX ORDER — SUVOREXANT 10 MG/1
TABLET, FILM COATED ORAL
Qty: 30 TAB | Refills: 2 | OUTPATIENT
Start: 2019-06-13 | End: 2020-03-24 | Stop reason: SDUPTHER

## 2019-06-13 NOTE — TELEPHONE ENCOUNTER
BELSOMRA 10 mg tablet [[id number]]     Order Details   Dose, Route, Frequency: As Directed Note to Pharmacy:   Not to exceed 4 additional fills before 06/12/2019. Dispense Quantity: 30 Tab Refills: 1 Fills remaining: --           Sig: TAKE 1 TABLET BY MOUTH AS NEEDED FOR SLEEP          Written Date: 06/12/19 Expiration Date: --     Start Date: 06/12/19 End Date: --            Ordering Provider:  -- EDWARDO #:  [id number] NPI:  --    Authorizing Provider:  Jne Azevedo MD EDWARDO #:  [id number] NPI:  [id number]    Ordering User:  Jen Azevedo MD            Diagnosis Association: Primary insomnia (F51.01)      Original Order:  suvorexant (BELSOMRA) 10 mg tablet [[id number]]      Pharmacy:  [facility]/pharmacy #[id number] - [place], [address] [facility]        Prescription has been called into [facility] pharmacy.

## 2019-08-09 DIAGNOSIS — G25.81 RESTLESS LEG: Primary | ICD-10-CM

## 2019-08-09 DIAGNOSIS — M54.50 CHRONIC MIDLINE LOW BACK PAIN WITHOUT SCIATICA: ICD-10-CM

## 2019-08-09 DIAGNOSIS — G89.29 CHRONIC MIDLINE LOW BACK PAIN WITHOUT SCIATICA: ICD-10-CM

## 2019-08-09 RX ORDER — GABAPENTIN 300 MG/1
300 CAPSULE ORAL
Qty: 90 CAP | Refills: 3 | Status: SHIPPED | OUTPATIENT
Start: 2019-08-09 | End: 2021-06-18 | Stop reason: SDUPTHER

## 2019-10-27 DIAGNOSIS — R11.0 NAUSEA: ICD-10-CM

## 2019-10-27 DIAGNOSIS — K22.719 BARRETT'S ESOPHAGUS WITH DYSPLASIA: ICD-10-CM

## 2019-10-28 RX ORDER — OMEPRAZOLE 20 MG/1
CAPSULE, DELAYED RELEASE ORAL
Qty: 90 CAP | Refills: 0 | Status: SHIPPED | OUTPATIENT
Start: 2019-10-28 | End: 2020-01-21

## 2020-01-21 DIAGNOSIS — R11.0 NAUSEA: ICD-10-CM

## 2020-01-21 DIAGNOSIS — K22.719 BARRETT'S ESOPHAGUS WITH DYSPLASIA: ICD-10-CM

## 2020-01-21 RX ORDER — OMEPRAZOLE 20 MG/1
CAPSULE, DELAYED RELEASE ORAL
Qty: 90 CAP | Refills: 0 | Status: SHIPPED | OUTPATIENT
Start: 2020-01-21 | End: 2020-03-26

## 2020-03-06 NOTE — PROGRESS NOTES
Chief Complaint   Patient presents with   [facility] Annual Wellness Visit     medicare wellness      1. Have you been to the ER, urgent care clinic since your last visit? Hospitalized since your last visit? No    2. Have you seen or consulted any other health care providers outside of the [address] [place] [address] since your last visit? Include any pap smears or colon screening.  No Previous Accession (Optional): [id number] Previous Accession (Optional): [id number]

## 2020-03-23 ENCOUNTER — TELEPHONE (OUTPATIENT)
Dept: INTERNAL MEDICINE CLINIC | Age: 70
End: 2020-03-23

## 2020-03-23 NOTE — TELEPHONE ENCOUNTER
Caller's first and last name and relationship (if not the patient): pt   Best contact number(s): [phone]   Whose call is being returned: Dr. Kehinde Soto   Details to clarify the request: n/a

## 2020-03-24 ENCOUNTER — VIRTUAL VISIT (OUTPATIENT)
Dept: INTERNAL MEDICINE CLINIC | Age: 70
End: 2020-03-24

## 2020-03-24 DIAGNOSIS — G25.81 RESTLESS LEG: Primary | ICD-10-CM

## 2020-03-24 DIAGNOSIS — Z78.0 POST-MENOPAUSAL: ICD-10-CM

## 2020-03-24 DIAGNOSIS — G89.29 CHRONIC MIDLINE LOW BACK PAIN WITHOUT SCIATICA: ICD-10-CM

## 2020-03-24 DIAGNOSIS — M54.50 CHRONIC MIDLINE LOW BACK PAIN WITHOUT SCIATICA: ICD-10-CM

## 2020-03-24 DIAGNOSIS — M85.851 OSTEOPENIA OF BOTH HIPS: ICD-10-CM

## 2020-03-24 DIAGNOSIS — M85.852 OSTEOPENIA OF BOTH HIPS: ICD-10-CM

## 2020-03-24 DIAGNOSIS — F51.01 PRIMARY INSOMNIA: ICD-10-CM

## 2020-03-24 DIAGNOSIS — Z13.1 SCREENING FOR DIABETES MELLITUS: ICD-10-CM

## 2020-03-24 DIAGNOSIS — E78.00 HIGH CHOLESTEROL: ICD-10-CM

## 2020-03-24 DIAGNOSIS — J45.20 MILD INTERMITTENT REACTIVE AIRWAY DISEASE WITHOUT COMPLICATION: ICD-10-CM

## 2020-03-24 DIAGNOSIS — I10 ESSENTIAL HYPERTENSION: ICD-10-CM

## 2020-03-24 RX ORDER — FLUTICASONE PROPIONATE 50 MCG
2 SPRAY, SUSPENSION (ML) NASAL
Qty: 1 BOTTLE | Refills: 11 | Status: SHIPPED | OUTPATIENT
Start: 2020-03-24 | End: 2020-12-16 | Stop reason: SDUPTHER

## 2020-03-24 RX ORDER — TRAMADOL HYDROCHLORIDE 50 MG/1
50 TABLET ORAL
Qty: 30 TAB | Refills: 0 | Status: SHIPPED | OUTPATIENT
Start: 2020-03-24 | End: 2020-04-23

## 2020-03-24 RX ORDER — OLMESARTAN MEDOXOMIL AND HYDROCHLOROTHIAZIDE 40/12.5 40; 12.5 MG/1; MG/1
TABLET ORAL
Qty: 90 TAB | Refills: 3 | Status: SHIPPED | OUTPATIENT
Start: 2020-03-24 | End: 2021-03-26

## 2020-03-24 RX ORDER — ALBUTEROL SULFATE 90 UG/1
1 AEROSOL, METERED RESPIRATORY (INHALATION)
Qty: 1 INHALER | Refills: 3 | Status: SHIPPED | OUTPATIENT
Start: 2020-03-24 | End: 2021-06-18 | Stop reason: SDUPTHER

## 2020-03-24 RX ORDER — ROSUVASTATIN CALCIUM 10 MG/1
10 TABLET, COATED ORAL
Qty: 90 TAB | Refills: 1 | Status: SHIPPED | OUTPATIENT
Start: 2020-03-24 | End: 2021-01-14

## 2020-03-24 NOTE — PROGRESS NOTES
CC: follow up     HPI:    She is a 77 y.o. female who presents for follow up     Video conference    Patient had UTI symptoms in October and took macrobid, however the culture was negative. She then saw Dr Nahun Tang for changing stool quality and nausea. Had upper GI - series  Dr Nahun Tang treated for possible intestinal bacterial overgrowth with a pack. 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 On PPI PRN         \"Started having pronounced back pain in late June 2018 -  Patient  thensaw Dr Yael Edmonds and had procedure \" severing the nerve\" and had great success. Feels better. Luan Horton been rarely taking tramadol -rarely for severe pain. Takes 50mg to 100mg if severe pain takes it specially after cleaning. Restless leg is bothersome - stopped gabapentin due to ankle swelling associated        HTN: BP checked at home and reports good  control        Insomnia: tried [place] and elavil in the past. prescribed belsomra and works but ran out requesting a refill      Got the flu shot  Reviewed normal mammogram in May 2019 normal    ROS:  10 systems reviewed and negative other than HPI     Past Medical History:   Diagnosis Date    Teague esophagus     small segment in 2014 Dr Dafne Degroot    Bradycardia     had negative stress test and nuclear imaging done in 2015    GERD (gastroesophageal reflux disease)     Glaucoma     H/O seasonal allergies     High cholesterol     Hypertension     NICOLÁS on CPAP     Osteopenia     Vertigo        Current Outpatient Medications on File Prior to Visit   Medication Sig Dispense Refill    omeprazole (PRILOSEC) 20 mg capsule TAKE ONE CAPSULE BY MOUTH EVERY DAY 90 Cap 0    gabapentin (NEURONTIN) 300 mg capsule Take 1 Cap by mouth nightly.  90 Cap 3    BELSOMRA 10 mg tablet TAKE 1 TABLET BY MOUTH AS NEEDED FOR SLEEP 30 Tab 2    olmesartan-hydroCHLOROthiazide (BENICAR HCT) 40-12.5 mg per tablet TAKE 1 TABLET BY MOUTH EVERY DAY 90 Tab 3    rosuvastatin (CRESTOR) 10 mg tablet Take 1 Tab by mouth nightly. 90 Tab 3    guaiFENesin ER (MUCINEX) 600 mg ER tablet Take 1 Tab by mouth daily as needed for Congestion. 90 Tab 3    fluticasone propionate (FLONASE) 50 mcg/actuation nasal spray 2 Sprays by Both Nostrils route daily as needed for Allergies. 1 Bottle 11    varicella-zoster recombinant, PF, (SHINGRIX, PF,) 50 mcg/0.5 mL susr injection 0.5mL by IntraMUSCular route once now and then repeat in 2-6 months 0.5 mL 1    cpap machine kit by Does Not Apply route. Pressure setting is 11 mm Hg  Diagnosis is NICOLÁS 1 Kit 0    albuterol (PROVENTIL HFA, VENTOLIN HFA, PROAIR HFA) 90 mcg/actuation inhaler Take 1 Puff by inhalation every four (4) hours as needed for Wheezing. 1 Inhaler 3    aspirin 81 mg chewable tablet Take 81 mg by mouth daily.  omega-3 fatty acids-vitamin e (FISH OIL) 1,000 mg cap Take 3 Caps by mouth daily.  calcium carbonate (OS-VJ) 500 mg calcium (1,250 mg) tablet Take 1 Tab by mouth daily. No current facility-administered medications on file prior to visit.         Past Surgical History:   Procedure Laterality Date    HX APPENDECTOMY      with jose    HX BUNIONECTOMY Bilateral     HX CATARACT REMOVAL Bilateral 2017    HX HYSTERECTOMY      HX OPEN CHOLECYSTECTOMY      with appy       Family History   Problem Relation Age of Onset    Colon Cancer Mother 76    Coronary Artery Disease Father 61    Breast Cancer Other         29's or 42's     Reviewed and no changes     Social History     Socioeconomic History    Marital status:      Spouse name: Not on file    Number of children: Not on file    Years of education: Not on file    Highest education level: Not on file   Occupational History    Not on file   Social Needs    Financial resource strain: Not on file    Food insecurity     Worry: Not on file     Inability: Not on file    Transportation needs     Medical: Not on file     Non-medical: Not on file   Tobacco Use    Smoking status: Never Smoker    Smokeless tobacco: Never Used   Substance and Sexual Activity    Alcohol use: No    Drug use: No    Sexual activity: Not Currently   Lifestyle    Physical activity     Days per week: Not on file     Minutes per session: Not on file    Stress: Not on file   Relationships    Social connections     Talks on phone: Not on file     Gets together: Not on file     Attends Holiness service: Not on file     Active member of club or organization: Not on file     Attends meetings of clubs or organizations: Not on file     Relationship status: Not on file    Intimate partner violence     Fear of current or ex partner: Not on file     Emotionally abused: Not on file     Physically abused: Not on file     Forced sexual activity: Not on file   Other Topics Concern    Not on file   Social History Narrative    Not on file            There were no vitals taken for this visit.     Vitals patient took at home   /80  Pulse 72  Temp 98  Physical Examination:   General - Well appearing female  HEENT - no palor, no scleral icterus  Pulm - not in respiratory distress  Psych - normal affect, appropriate mood    Lab Results   Component Value Date/Time    WBC 4.8 08/31/2018 03:44 PM    HGB 13.7 08/31/2018 03:44 PM    HCT 40.6 08/31/2018 03:44 PM    PLATELET 085 99/65/2146 03:44 PM    MCV 92 08/31/2018 03:44 PM     Lab Results   Component Value Date/Time    Sodium 142 04/19/2019 09:57 AM    Potassium 5.1 04/19/2019 09:57 AM    Chloride 103 04/19/2019 09:57 AM    CO2 25 04/19/2019 09:57 AM    Anion gap 8 10/26/2015 06:31 PM    Glucose 99 04/19/2019 09:57 AM    BUN 19 04/19/2019 09:57 AM    Creatinine 0.69 04/19/2019 09:57 AM    BUN/Creatinine ratio 28 04/19/2019 09:57 AM    GFR est  04/19/2019 09:57 AM    GFR est non-AA 90 04/19/2019 09:57 AM    Calcium 10.5 (H) 04/19/2019 09:57 AM     Lab Results   Component Value Date/Time Cholesterol, total 168 08/31/2018 03:44 PM    HDL Cholesterol 57 08/31/2018 03:44 PM    LDL, calculated 80 08/31/2018 03:44 PM    VLDL, calculated 31 08/31/2018 03:44 PM    Triglyceride 154 (H) 08/31/2018 03:44 PM     Lab Results   Component Value Date/Time    TSH 2.080 04/19/2019 09:57 AM     No results found for: PSA, PSA2, Evone Comes, [id number], [id number], PSALT  Lab Results   Component Value Date/Time    Hemoglobin A1c 6.0 (H) 04/19/2019 09:57 AM     Lab Results   Component Value Date/Time    VITAMIN D, 25-HYDROXY 61.2 03/23/2017 11:06 AM       Lab Results   Component Value Date/Time    ALT (SGPT) 18 04/19/2019 09:57 AM    AST (SGOT) 25 04/19/2019 09:57 AM    Alk. phosphatase 52 04/19/2019 09:57 AM    Bilirubin, direct 0.11 03/23/2017 11:06 AM    Bilirubin, total 0.3 04/19/2019 09:57 AM           Assessment/Plan:    1. Essential hypertension  - blood pressure is well controlled on benicar, continue current regimen  - METABOLIC PANEL, COMPREHENSIVE when covid situation resolves she can come for labs     2. Sleep apnea, unspecified type  - on CPAP encouraged compliance    3. Chronic bilateral low back pain with bilateral sciatica  - had MRI of back with mild to moderate stenosis and takes tramadol only for severe pain/ had a nerve block which has helped. Rare use of tramadol refilled #30 today     4. High cholesterol  - on crestor last LDL 80     5. Insomnia: belsomra works refilled     6. Restless leg syndrome: currently not on medication    7. GERD: on PPI ( barretts ), follow with GI PRN     8. Osteopenia: patient is taking vitamin D and due for dexa scan     This is an established visit conducted via telemedicine. The patient has been instructed that this meets HIPAA criteria and acknowledges and agrees to this method of visitation.     Jeremy Singh MD  03/24/20  1:44 PM      Spent 28 minutes with patient on video conference  Jeremy Singh MD

## 2020-03-24 NOTE — TELEPHONE ENCOUNTER
Spoke with patient. Two pt identifiers confirmed. Patient offered a telemedicine visit today in place of her in office visit today. Patient accepted. This is an established visit conducted via telemedicine. The patient has been instructed that this meets HIPAA criteria and acknowledges and agrees to this method of visitation.     Derick Stanford LPN  49/55/62  2:60 AM

## 2020-03-26 DIAGNOSIS — R11.0 NAUSEA: ICD-10-CM

## 2020-03-26 DIAGNOSIS — K22.719 BARRETT'S ESOPHAGUS WITH DYSPLASIA: ICD-10-CM

## 2020-03-26 RX ORDER — OMEPRAZOLE 20 MG/1
CAPSULE, DELAYED RELEASE ORAL
Qty: 90 CAP | Refills: 0 | Status: SHIPPED | OUTPATIENT
Start: 2020-03-26 | End: 2020-06-22

## 2020-04-01 ENCOUNTER — TELEPHONE (OUTPATIENT)
Dept: INTERNAL MEDICINE CLINIC | Age: 70
End: 2020-04-01

## 2020-04-01 NOTE — TELEPHONE ENCOUNTER
#[phone] Dorie with [employer] is asking If you found alternate to [address] as that is a non formulary medication.     Please call [employer] about prior auth

## 2020-06-21 DIAGNOSIS — K22.719 BARRETT'S ESOPHAGUS WITH DYSPLASIA: ICD-10-CM

## 2020-06-21 DIAGNOSIS — R11.0 NAUSEA: ICD-10-CM

## 2020-06-22 RX ORDER — OMEPRAZOLE 20 MG/1
CAPSULE, DELAYED RELEASE ORAL
Qty: 90 CAP | Refills: 0 | Status: SHIPPED | OUTPATIENT
Start: 2020-06-22 | End: 2020-09-01 | Stop reason: SDUPTHER

## 2020-06-26 ENCOUNTER — TELEPHONE (OUTPATIENT)
Dept: INTERNAL MEDICINE CLINIC | Age: 70
End: 2020-06-26

## 2020-06-26 DIAGNOSIS — G89.29 CHRONIC MIDLINE LOW BACK PAIN WITHOUT SCIATICA: Primary | ICD-10-CM

## 2020-06-26 DIAGNOSIS — M54.50 CHRONIC MIDLINE LOW BACK PAIN WITHOUT SCIATICA: Primary | ICD-10-CM

## 2020-06-26 RX ORDER — TRAMADOL HYDROCHLORIDE 50 MG/1
50 TABLET ORAL
Qty: 30 TAB | Refills: 0 | Status: SHIPPED | OUTPATIENT
Start: 2020-06-26 | End: 2020-07-26

## 2020-06-26 NOTE — TELEPHONE ENCOUNTER
----- Message from Lucia Hooper sent at 2020 11:20 AM EDT -----  Regarding: Dr. Efrain Pugh Message/Vendor Calls  To: [facility]  Subject: Dr. Brown Anton  Patient's first and last name: Juan F Sandoval    ID numbers: #[id number]  : 1950  Caller's first and last name: N/A  Reason for call: Pt. Requested order for LAB Edy test be resubmitted. Also refill Tramadol 50 mg. Callback required yes/no and why: Yes, to inform.    Best contact number(s): [phone]  Details to clarify the request: N/A

## 2020-07-07 LAB
ALBUMIN SERPL-MCNC: 4.3 G/DL (ref 3.8–4.8)
ALBUMIN/GLOB SERPL: 1.9 {RATIO} (ref 1.2–2.2)
ALP SERPL-CCNC: 57 IU/L (ref 39–117)
ALT SERPL-CCNC: 20 IU/L (ref 0–32)
AST SERPL-CCNC: 32 IU/L (ref 0–40)
BASOPHILS # BLD AUTO: 0.1 X10E3/UL (ref 0–0.2)
BASOPHILS NFR BLD AUTO: 1 %
BILIRUB SERPL-MCNC: 0.5 MG/DL (ref 0–1.2)
BUN SERPL-MCNC: 15 MG/DL (ref 8–27)
BUN/CREAT SERPL: 25 (ref 12–28)
CALCIUM SERPL-MCNC: 9.5 MG/DL (ref 8.7–10.3)
CHLORIDE SERPL-SCNC: 104 MMOL/L (ref 96–106)
CHOLEST SERPL-MCNC: 174 MG/DL (ref 100–199)
CO2 SERPL-SCNC: 24 MMOL/L (ref 20–29)
CREAT SERPL-MCNC: 0.6 MG/DL (ref 0.57–1)
EOSINOPHIL # BLD AUTO: 0.1 X10E3/UL (ref 0–0.4)
EOSINOPHIL NFR BLD AUTO: 3 %
ERYTHROCYTE [DISTWIDTH] IN BLOOD BY AUTOMATED COUNT: 12.4 % (ref 11.7–15.4)
GLOBULIN SER CALC-MCNC: 2.3 G/DL (ref 1.5–4.5)
GLUCOSE SERPL-MCNC: 94 MG/DL (ref 65–99)
HBA1C MFR BLD: 5.7 % (ref 4.8–5.6)
HCT VFR BLD AUTO: 38.1 % (ref 34–46.6)
HDLC SERPL-MCNC: 64 MG/DL
HGB BLD-MCNC: 12.9 G/DL (ref 11.1–15.9)
IMM GRANULOCYTES # BLD AUTO: 0 X10E3/UL (ref 0–0.1)
IMM GRANULOCYTES NFR BLD AUTO: 0 %
LDLC SERPL CALC-MCNC: 80 MG/DL (ref 0–99)
LYMPHOCYTES # BLD AUTO: 1.8 X10E3/UL (ref 0.7–3.1)
LYMPHOCYTES NFR BLD AUTO: 38 %
MCH RBC QN AUTO: 31.1 PG (ref 26.6–33)
MCHC RBC AUTO-ENTMCNC: 33.9 G/DL (ref 31.5–35.7)
MCV RBC AUTO: 92 FL (ref 79–97)
MONOCYTES # BLD AUTO: 0.4 X10E3/UL (ref 0.1–0.9)
MONOCYTES NFR BLD AUTO: 9 %
NEUTROPHILS # BLD AUTO: 2.3 X10E3/UL (ref 1.4–7)
NEUTROPHILS NFR BLD AUTO: 49 %
PLATELET # BLD AUTO: 368 X10E3/UL (ref 150–450)
POTASSIUM SERPL-SCNC: 3.9 MMOL/L (ref 3.5–5.2)
PROT SERPL-MCNC: 6.6 G/DL (ref 6–8.5)
RBC # BLD AUTO: 4.15 X10E6/UL (ref 3.77–5.28)
SODIUM SERPL-SCNC: 142 MMOL/L (ref 134–144)
TRIGL SERPL-MCNC: 150 MG/DL (ref 0–149)
VLDLC SERPL CALC-MCNC: 30 MG/DL (ref 5–40)
WBC # BLD AUTO: 4.7 X10E3/UL (ref 3.4–10.8)

## 2020-07-07 NOTE — TELEPHONE ENCOUNTER
Normal blood count  Normal kidney and liver function   Blood sugar looks excellent in mild pre diabetes range   Keep up good work

## 2020-07-09 ENCOUNTER — HOSPITAL ENCOUNTER (OUTPATIENT)
Dept: MAMMOGRAPHY | Age: 70
Discharge: HOME | End: 2020-07-09
Attending: INTERNAL MEDICINE
Payer: MEDICARE

## 2020-07-09 DIAGNOSIS — M85.851 OSTEOPENIA OF BOTH HIPS: ICD-10-CM

## 2020-07-09 DIAGNOSIS — Z12.31 SCREENING MAMMOGRAM FOR HIGH-RISK PATIENT: ICD-10-CM

## 2020-07-09 DIAGNOSIS — Z78.0 POST-MENOPAUSAL: ICD-10-CM

## 2020-07-09 DIAGNOSIS — M85.852 OSTEOPENIA OF BOTH HIPS: ICD-10-CM

## 2020-07-09 PROCEDURE — 77080 DXA BONE DENSITY AXIAL: CPT

## 2020-07-09 PROCEDURE — 77063 BREAST TOMOSYNTHESIS BI: CPT

## 2020-07-28 ENCOUNTER — TELEPHONE (OUTPATIENT)
Dept: INTERNAL MEDICINE CLINIC | Age: 70
End: 2020-07-28

## 2020-07-28 NOTE — TELEPHONE ENCOUNTER
#[phone] pt is waiting on her dexa scan results. This was done on 7-9-20. Please call pt today if possible. #[phone] pt can not access her My Chart account. She is asking that both the dexa scan and lab results be mailed to her. Please mail as soon as possible. Thanks.

## 2020-07-28 NOTE — PROGRESS NOTES
This patient is osteopenic using the World Health Organization criteria  A direct comparison to the last exam cannot be made due to differences in  machinery. Bone density testing shows osteopenia. Recommend weight bearing exercise, like walking, fall prevention, and avoidance of heavy alcohol use. Calcium 1200mg daily, most from foods, less from supplement. Take Vitamin D3 2,000 iu once a day. Recheck bone density test in 3 years.

## 2020-07-29 NOTE — TELEPHONE ENCOUNTER
Spoke with patient. Two pt identifiers confirmed. Discussed labs and bone density results with patient per Dr. Nalini Coats. Pt verbalized understanding of information discussed w/ no further questions at this time.

## 2020-09-01 ENCOUNTER — VIRTUAL VISIT (OUTPATIENT)
Dept: INTERNAL MEDICINE CLINIC | Age: 70
End: 2020-09-01
Payer: MEDICARE

## 2020-09-01 DIAGNOSIS — G89.29 CHRONIC MIDLINE LOW BACK PAIN WITHOUT SCIATICA: Primary | ICD-10-CM

## 2020-09-01 DIAGNOSIS — I10 ESSENTIAL HYPERTENSION: ICD-10-CM

## 2020-09-01 DIAGNOSIS — M85.851 OSTEOPENIA OF BOTH HIPS: ICD-10-CM

## 2020-09-01 DIAGNOSIS — M85.852 OSTEOPENIA OF BOTH HIPS: ICD-10-CM

## 2020-09-01 DIAGNOSIS — M54.50 CHRONIC MIDLINE LOW BACK PAIN WITHOUT SCIATICA: Primary | ICD-10-CM

## 2020-09-01 DIAGNOSIS — R11.0 NAUSEA: ICD-10-CM

## 2020-09-01 DIAGNOSIS — K22.719 BARRETT'S ESOPHAGUS WITH DYSPLASIA: ICD-10-CM

## 2020-09-01 DIAGNOSIS — E78.00 HIGH CHOLESTEROL: ICD-10-CM

## 2020-09-01 DIAGNOSIS — Z00.00 MEDICARE ANNUAL WELLNESS VISIT, SUBSEQUENT: ICD-10-CM

## 2020-09-01 PROCEDURE — G8427 DOCREV CUR MEDS BY ELIG CLIN: HCPCS | Performed by: INTERNAL MEDICINE

## 2020-09-01 PROCEDURE — G0439 PPPS, SUBSEQ VISIT: HCPCS | Performed by: INTERNAL MEDICINE

## 2020-09-01 PROCEDURE — G8399 PT W/DXA RESULTS DOCUMENT: HCPCS | Performed by: INTERNAL MEDICINE

## 2020-09-01 PROCEDURE — 1101F PT FALLS ASSESS-DOCD LE1/YR: CPT | Performed by: INTERNAL MEDICINE

## 2020-09-01 PROCEDURE — G8756 NO BP MEASURE DOC: HCPCS | Performed by: INTERNAL MEDICINE

## 2020-09-01 PROCEDURE — G9899 SCRN MAM PERF RSLTS DOC: HCPCS | Performed by: INTERNAL MEDICINE

## 2020-09-01 PROCEDURE — 3017F COLORECTAL CA SCREEN DOC REV: CPT | Performed by: INTERNAL MEDICINE

## 2020-09-01 PROCEDURE — G8432 DEP SCR NOT DOC, RNG: HCPCS | Performed by: INTERNAL MEDICINE

## 2020-09-01 RX ORDER — OMEPRAZOLE 20 MG/1
20 CAPSULE, DELAYED RELEASE ORAL DAILY
Qty: 90 CAP | Refills: 1 | Status: SHIPPED | OUTPATIENT
Start: 2020-09-01 | End: 2021-01-14

## 2020-09-01 RX ORDER — TRAMADOL HYDROCHLORIDE 50 MG/1
50 TABLET ORAL
Qty: 30 TAB | Refills: 0 | Status: SHIPPED | OUTPATIENT
Start: 2020-09-01 | End: 2020-10-01

## 2020-09-01 NOTE — PROGRESS NOTES
CC: follow up     HPI:    She is a 72 y.o. female who presents for follow up     Video conference    122.82      Patient had UTI symptoms in October and took macrobid, however the culture was negative. She then saw Dr German Keane for changing stool quality and nausea. Had upper GI - series  Dr German Keane treated for possible intestinal bacterial overgrowth with a pack. 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 On PPI PRN   Currently no abdominal pain     Osteopenia - recent diagnosis - patient started on 2000 units D3 notes constipation and will  Cut back to 1000 units daily      \"Started having pronounced back pain in late June 2018 -  Patient  thensaw Dr Carvajal Erm and had procedure \" severing the nerve\" and had great success. Feels better. Ace Xander been rarely taking tramadol -rarely for severe pain. Takes 50mg to 100mg if severe pain takes it specially after cleaning.      Restless leg is bothersome - stopped gabapentin due to ankle swelling associated        HTN: BP checked at home and reports good  Control/   122.82  On olmesartan - HCTZ 40-12.5mg      On crestor for cholesterol 10mg daily last LDL 07/2020 LDL 80  When stopped cholesterol went up to 300  Had muscle weakness with zocor       Insomnia: tried [place] and elavil in the past. prescribed belsomra and works but ran out requesting a refill        ROS:  10 systems reviewed and negative other than HPI     Past Medical History:   Diagnosis Date    Teague esophagus     small segment in 2014 Dr Hays Ear    Bradycardia     had negative stress test and nuclear imaging done in 2015    GERD (gastroesophageal reflux disease)     Glaucoma     H/O seasonal allergies     High cholesterol     Hypertension     NICOLÁS on CPAP     Osteopenia     Vertigo        Current Outpatient Medications on File Prior to Visit   Medication Sig Dispense Refill    omeprazole (PRILOSEC) 20 mg capsule TAKE 1 CAPSULE BY MOUTH EVERY DAY 90 Cap 0    suvorexant (Belsomra) 10 mg tablet Take 1 Tab by mouth nightly as needed for Insomnia. Max Daily Amount: 10 mg. 30 Tab 2    albuterol (PROVENTIL HFA, VENTOLIN HFA, PROAIR HFA) 90 mcg/actuation inhaler Take 1 Puff by inhalation every four (4) hours as needed for Wheezing. 1 Inhaler 3    rosuvastatin (Crestor) 10 mg tablet Take 1 Tab by mouth nightly. 90 Tab 1    olmesartan-hydroCHLOROthiazide (BENICAR HCT) 40-12.5 mg per tablet TAKE 1 TABLET BY MOUTH EVERY DAY 90 Tab 3    fluticasone propionate (Flonase) 50 mcg/actuation nasal spray 2 Sprays by Both Nostrils route daily as needed for Allergies. 1 Bottle 11    gabapentin (NEURONTIN) 300 mg capsule Take 1 Cap by mouth nightly. 90 Cap 3    guaiFENesin ER (MUCINEX) 600 mg ER tablet Take 1 Tab by mouth daily as needed for Congestion. 90 Tab 3    varicella-zoster recombinant, PF, (SHINGRIX, PF,) 50 mcg/0.5 mL susr injection 0.5mL by IntraMUSCular route once now and then repeat in 2-6 months 0.5 mL 1    cpap machine kit by Does Not Apply route. Pressure setting is 11 mm Hg  Diagnosis is NICOLÁS 1 Kit 0    aspirin 81 mg chewable tablet Take 81 mg by mouth daily.  omega-3 fatty acids-vitamin e (FISH OIL) 1,000 mg cap Take 3 Caps by mouth daily.  calcium carbonate (OS-VJ) 500 mg calcium (1,250 mg) tablet Take 1 Tab by mouth daily. No current facility-administered medications on file prior to visit.         Past Surgical History:   Procedure Laterality Date    HX APPENDECTOMY      with jose    HX BUNIONECTOMY Bilateral     HX CATARACT REMOVAL Bilateral 2017    HX HYSTERECTOMY      HX OPEN CHOLECYSTECTOMY      with appy       Family History   Problem Relation Age of Onset    Colon Cancer Mother 76    Coronary Artery Disease Father 61    Breast Cancer Other         29's or 42's     Reviewed and no changes     Social History     Socioeconomic History    Marital status:      Spouse name: Not on file    Number of children: Not on file    Years of education: Not on file    Highest education level: Not on file   Occupational History    Not on file   Social Needs    Financial resource strain: Not on file    Food insecurity     Worry: Not on file     Inability: Not on file    Transportation needs     Medical: Not on file     Non-medical: Not on file   Tobacco Use    Smoking status: Never Smoker    Smokeless tobacco: Never Used   Substance and Sexual Activity    Alcohol use: No    Drug use: No    Sexual activity: Not Currently   Lifestyle    Physical activity     Days per week: Not on file     Minutes per session: Not on file    Stress: Not on file   Relationships    Social connections     Talks on phone: Not on file     Gets together: Not on file     Attends [employer] service: Not on file     Active member of club or organization: Not on file     Attends meetings of clubs or organizations: Not on file     Relationship status: Not on file    Intimate partner violence     Fear of current or ex partner: Not on file     Emotionally abused: Not on file     Physically abused: Not on file     Forced sexual activity: Not on file   Other Topics Concern    Not on file   Social History Narrative    Not on file            There were no vitals taken for this visit.     Vitals patient took at home   /80  Pulse 72  Temp 98  Physical Examination:   General - Well appearing female  HEENT - no palor, no scleral icterus  Pulm - not in respiratory distress  Abdominal: patient does not notice masses in abdomen  Psych - normal affect, appropriate mood    Lab Results   Component Value Date/Time    WBC 4.7 07/06/2020 11:21 AM    HGB 12.9 07/06/2020 11:21 AM    HCT 38.1 07/06/2020 11:21 AM    PLATELET 621 32/56/3018 11:21 AM    MCV 92 07/06/2020 11:21 AM     Lab Results   Component Value Date/Time    Sodium 142 07/06/2020 11:21 AM    Potassium 3.9 07/06/2020 11:21 AM    Chloride 104 07/06/2020 11:21 AM CO2 24 07/06/2020 11:21 AM    Anion gap 8 10/26/2015 06:31 PM    Glucose 94 07/06/2020 11:21 AM    BUN 15 07/06/2020 11:21 AM    Creatinine 0.60 07/06/2020 11:21 AM    BUN/Creatinine ratio 25 07/06/2020 11:21 AM    GFR est  07/06/2020 11:21 AM    GFR est non-AA 94 07/06/2020 11:21 AM    Calcium 9.5 07/06/2020 11:21 AM     Lab Results   Component Value Date/Time    Cholesterol, total 174 07/06/2020 11:21 AM    HDL Cholesterol 64 07/06/2020 11:21 AM    LDL, calculated 80 07/06/2020 11:21 AM    VLDL, calculated 30 07/06/2020 11:21 AM    Triglyceride 150 (H) 07/06/2020 11:21 AM     Lab Results   Component Value Date/Time    TSH 2.080 04/19/2019 09:57 AM     No results found for: PSA, PSA2, Salvadore Pounds, PSAR3, [id number], [id number], PSALT  Lab Results   Component Value Date/Time    Hemoglobin A1c 5.7 (H) 07/06/2020 11:21 AM     Lab Results   Component Value Date/Time    VITAMIN D, 25-HYDROXY 61.2 03/23/2017 11:06 AM       Lab Results   Component Value Date/Time    ALT (SGPT) 20 07/06/2020 11:21 AM    Alk. phosphatase 57 07/06/2020 11:21 AM    Bilirubin, direct 0.11 03/23/2017 11:06 AM    Bilirubin, total 0.5 07/06/2020 11:21 AM           Assessment/Plan:    1. Essential hypertension  - blood pressure is well controlled on benicar, continue current regimen  - recommend checking blood pressure with goal of less than  130/85 on average. Follow lo sodium diet. Given DASH diet guidelines. Recommend cardiovascular exercise regularly. Work on weight reduction. Call with blood pressure readings. 2. Sleep apnea, unspecified type  - on CPAP encouraged compliance    3. Chronic bilateral low back pain with bilateral sciatica  - had MRI of back with mild to moderate stenosis and takes tramadol only for severe pain/ had a nerve block which has helped. Rare use of tramadol refilled #30 today     4. High cholesterol  - on crestor last LDL 80     5. Insomnia: belsomra  PRN    6.  Restless leg syndrome: currently not on medication    7. GERD: on PPI ( barretts ), follow with GI PRN     8. Osteopenia: patient is taking vitamin D and calcium and exercising    This is an established visit conducted via telemedicine. The patient has been instructed that this meets HIPAA criteria and acknowledges and agrees to this method of visitation. This is the Subsequent Medicare Annual Wellness Exam, performed 12 months or more after the Initial AWV or the last Subsequent AWV    I have reviewed the patient's medical history in detail and updated the computerized patient record. History     Patient Active Problem List   Diagnosis Code    Chest pain R07.9    Hypertension I10    High cholesterol E78.00    Osteopenia M85.80    Sleep apnea G47.30    Primary open angle glaucoma of right eye, mild stage H40.1111    Combined forms of age-related cataract of right eye H25.811    Primary open angle glaucoma of left eye, mild stage H40.1121    Combined forms of age-related cataract of left eye H25.812    Right posterior capsular opacification H26.491    Left posterior capsular opacification H26.492     Past Medical History:   Diagnosis Date    Teague esophagus     small segment in 2014 Dr Liliane Romero    Bradycardia     had negative stress test and nuclear imaging done in 2015    GERD (gastroesophageal reflux disease)     Glaucoma     H/O seasonal allergies     High cholesterol     Hypertension     NICOLÁS on CPAP     Osteopenia     Vertigo       Past Surgical History:   Procedure Laterality Date    HX APPENDECTOMY      with jose    HX BUNIONECTOMY Bilateral     HX CATARACT REMOVAL Bilateral 2017    HX HYSTERECTOMY      HX OPEN CHOLECYSTECTOMY      with appy     Current Outpatient Medications   Medication Sig Dispense Refill    traMADoL (ULTRAM) 50 mg tablet Take 1 Tab by mouth daily as needed for Pain for up to 30 days. 30 Tab 0    omeprazole (PRILOSEC) 20 mg capsule Take 1 Cap by mouth daily.  90 Cap 1    suvorexant (Belsomra) 10 mg tablet Take 1 Tab by mouth nightly as needed for Insomnia. Max Daily Amount: 10 mg. 30 Tab 2    albuterol (PROVENTIL HFA, VENTOLIN HFA, PROAIR HFA) 90 mcg/actuation inhaler Take 1 Puff by inhalation every four (4) hours as needed for Wheezing. 1 Inhaler 3    rosuvastatin (Crestor) 10 mg tablet Take 1 Tab by mouth nightly. 90 Tab 1    olmesartan-hydroCHLOROthiazide (BENICAR HCT) 40-12.5 mg per tablet TAKE 1 TABLET BY MOUTH EVERY DAY 90 Tab 3    fluticasone propionate (Flonase) 50 mcg/actuation nasal spray 2 Sprays by Both Nostrils route daily as needed for Allergies. 1 Bottle 11    gabapentin (NEURONTIN) 300 mg capsule Take 1 Cap by mouth nightly. 90 Cap 3    varicella-zoster recombinant, PF, (SHINGRIX, PF,) 50 mcg/0.5 mL susr injection 0.5mL by IntraMUSCular route once now and then repeat in 2-6 months 0.5 mL 1    cpap machine kit by Does Not Apply route. Pressure setting is 11 mm Hg  Diagnosis is NICOLÁS 1 Kit 0    aspirin 81 mg chewable tablet Take 81 mg by mouth daily.  omega-3 fatty acids-vitamin e (FISH OIL) 1,000 mg cap Take 3 Caps by mouth daily.  calcium carbonate (OS-VJ) 500 mg calcium (1,250 mg) tablet Take 1 Tab by mouth daily.        Allergies   Allergen Reactions    Latex Rash    Codeine Nausea and Vomiting    Thiuram Analogues Rash       Family History   Problem Relation Age of Onset    Colon Cancer Mother 76    Coronary Artery Disease Father 61    Breast Cancer Other         29's or 42's     Social History     Tobacco Use    Smoking status: Never Smoker    Smokeless tobacco: Never Used   Substance Use Topics    Alcohol use: No       Depression Risk Factor Screening:     3 most recent PHQ Screens 4/19/2019   Little interest or pleasure in doing things Not at all   Feeling down, depressed, irritable, or hopeless Not at all   Total Score PHQ 2 0       Alcohol Risk Factor Screening:   Do you average 1 drink per night or more than 7 drinks a week:  No    On any one occasion in the past three months have you have had more than 3 drinks containing alcohol:  No      Functional Ability and Level of Safety:   Hearing: Hearing is good. Activities of Daily Living: The home contains: no safety equipment. Patient does total self care     Ambulation: with no difficulty     Fall Risk:  Fall Risk Assessment, last 12 mths 4/19/2019   Able to walk? Yes   Fall in past 12 months? No     Abuse Screen:  Patient is not abused       Cognitive Screening   Has your family/caregiver stated any concerns about your memory: no    Cognitive Screening: Normal - Verbal Fluency Test    Patient Care Team   Patient Care Team:  Theresa Higgins MD as PCP - General (Internal Medicine)  Theresa Higgins MD as PCP - [facility] Empaneled Provider  Rashad Gilliam MD as Consulting Provider (Gastroenterology)  Alisson Vu MD (Pain Management)  Alisson Vu MD (Pain Management)    Assessment/Plan   Education and counseling provided:  Are appropriate based on today's review and evaluation    Diagnoses and all orders for this visit:    1. Chronic midline low back pain without sciatica  -     traMADoL (ULTRAM) 50 mg tablet; Take 1 Tab by mouth daily as needed for Pain for up to 30 days. 2. Teague's esophagus with dysplasia  -     omeprazole (PRILOSEC) 20 mg capsule; Take 1 Cap by mouth daily. 3. Essential hypertension    4. High cholesterol    5. Osteopenia of both hips    6. Nausea  -     omeprazole (PRILOSEC) 20 mg capsule; Take 1 Cap by mouth daily.     7. Medicare annual wellness visit, subsequent        Health Maintenance Due   Topic Date Due    Shingrix Vaccine Age 49> (1 of 2) 07/31/2001    GLAUCOMA SCREENING Q2Y  03/01/2019    Flu Vaccine (1) 09/01/2020       Leslie Azevedo, who was evaluated through a synchronous (real-time) audio-video encounter, and/or her healthcare decision maker, is aware that it is a billable service, with coverage as determined by her insurance carrier. She provided verbal consent to proceed: Yes, and patient identification was verified. It was conducted pursuant to the emergency declaration under the [address] [place] [address] and the [employer] General Act. A caregiver was present when appropriate. Ability to conduct physical exam was limited. I was at home. The patient was at home.     Joy Alva MD

## 2020-09-01 NOTE — PATIENT INSTRUCTIONS
Medicare Wellness Visit, Female The best way to live healthy is to have a lifestyle where you eat a well-balanced diet, exercise regularly, limit alcohol use, and quit all forms of tobacco/nicotine, if applicable. Regular preventive services are another way to keep healthy. Preventive services (vaccines, screening tests, monitoring & exams) can help personalize your care plan, which helps you manage your own care. Screening tests can find health problems at the earliest stages, when they are easiest to treat. [facility] follows the current, evidence-based guidelines published by the [facility] Jostin Wolff ([facility]) when recommending preventive services for our patients. Because we follow these guidelines, sometimes recommendations change over time as research supports it. (For example, mammograms used to be recommended annually. Even though Medicare will still pay for an annual mammogram, the newer guidelines recommend a mammogram every two years for women of average risk). Of course, you and your doctor may decide to screen more often for some diseases, based on your risk and your co-morbidities (chronic disease you are already diagnosed with). Preventive services for you include: - Medicare offers their members a free annual wellness visit, which is time for you and your primary care provider to discuss and plan for your preventive service needs. Take advantage of this benefit every year! 
-All adults over the age of 72 should receive the recommended pneumonia vaccines. Current USPSTF guidelines recommend a series of two vaccines for the best pneumonia protection.  
-All adults should have a flu vaccine yearly and a tetanus vaccine every 10 years.  
-All adults age 48 and older should receive the shingles vaccines (series of two vaccines). -All adults age 38-68 who are overweight should have a diabetes screening test once every three years. -All adults born between [birth year] and 1964 should be screened once for Hepatitis C. 
-Other screening tests and preventive services for persons with diabetes include: an eye exam to screen for diabetic retinopathy, a kidney function test, a foot exam, and stricter control over your cholesterol.  
-Cardiovascular screening for adults with routine risk involves an electrocardiogram (ECG) at intervals determined by your doctor.  
-Colorectal cancer screenings should be done for adults age 54-65 with no increased risk factors for colorectal cancer. There are a number of acceptable methods of screening for this type of cancer. Each test has its own benefits and drawbacks. Discuss with your doctor what is most appropriate for you during your annual wellness visit. The different tests include: colonoscopy (considered the best screening method), a fecal occult blood test, a fecal DNA test, and sigmoidoscopy. 
 
-A bone mass density test is recommended when a woman turns 65 to screen for osteoporosis. This test is only recommended one time, as a screening. Some providers will use this same test as a disease monitoring tool if you already have osteoporosis. -Breast cancer screenings are recommended every other year for women of normal risk, age 54-69. 
-Cervical cancer screenings for women over age 72 are only recommended with certain risk factors. Here is a list of your current Health Maintenance items (your personalized list of preventive services) with a due date: 
Health Maintenance Due Topic Date Due  Shingles Vaccine (1 of 2) 07/31/2001  Glaucoma Screening   03/01/2019  Yearly Flu Vaccine (1) 09/01/2020

## 2020-12-16 DIAGNOSIS — M54.50 CHRONIC BILATERAL LOW BACK PAIN WITHOUT SCIATICA: Primary | ICD-10-CM

## 2020-12-16 DIAGNOSIS — G89.29 CHRONIC BILATERAL LOW BACK PAIN WITHOUT SCIATICA: Primary | ICD-10-CM

## 2020-12-16 RX ORDER — FLUTICASONE PROPIONATE 50 MCG
2 SPRAY, SUSPENSION (ML) NASAL
Qty: 1 BOTTLE | Refills: 11 | Status: SHIPPED | OUTPATIENT
Start: 2020-12-16 | End: 2022-02-01 | Stop reason: SDUPTHER

## 2020-12-16 RX ORDER — TRAMADOL HYDROCHLORIDE 50 MG/1
50 TABLET ORAL
Qty: 10 TAB | Refills: 0 | Status: SHIPPED | OUTPATIENT
Start: 2020-12-16 | End: 2021-03-28

## 2020-12-16 NOTE — TELEPHONE ENCOUNTER
----- Message from Florina Sherman sent at 12/16/2020  9:20 AM EST -----  Regarding: Dr. Mathew Krishnan / Va Ferrell (if not patient): NA  Relationship of caller (if not patient):NA  Best contact number(s): [phone]  Name of medication and dosage if known: Flonase (generic) , Tramadol 50mg   Is patient out of this medication (yes/no):  No  Pharmacy name: [facility] in [address] [place] [address] listed in chart? (yes/no): Yes   Pharmacy phone number: [phone]  Date of last visit: 9/1/20  Details to clarify the request: called last week and hasn't heard anything back     Message from [facility]

## 2021-01-14 DIAGNOSIS — R11.0 NAUSEA: ICD-10-CM

## 2021-01-14 DIAGNOSIS — E78.00 HIGH CHOLESTEROL: ICD-10-CM

## 2021-01-14 DIAGNOSIS — K22.719 BARRETT'S ESOPHAGUS WITH DYSPLASIA: ICD-10-CM

## 2021-01-14 RX ORDER — ROSUVASTATIN CALCIUM 10 MG/1
TABLET, COATED ORAL
Qty: 90 TAB | Refills: 1 | Status: SHIPPED | OUTPATIENT
Start: 2021-01-14 | End: 2021-06-18 | Stop reason: SDUPTHER

## 2021-01-14 RX ORDER — OMEPRAZOLE 20 MG/1
CAPSULE, DELAYED RELEASE ORAL
Qty: 90 CAP | Refills: 1 | Status: SHIPPED | OUTPATIENT
Start: 2021-01-14 | End: 2021-06-18 | Stop reason: ALTCHOICE

## 2021-03-24 ENCOUNTER — TELEPHONE (OUTPATIENT)
Dept: INTERNAL MEDICINE CLINIC | Age: 71
End: 2021-03-24

## 2021-03-24 NOTE — TELEPHONE ENCOUNTER
----- Message from Albert Key sent at 3/24/2021  1:47 PM EDT -----  Regarding: Dr. Rema Bahena  Contact: [phone]  Medication Refill    Caller (if not patient): N/A      Relationship of caller (if not patient): N/A      Best contact number(s): [phone]      Name of medication and dosage if known: Tramadol 50mg       Is patient out of this medication (yes/no): yes      Pharmacy name: [facility] [address]    Pharmacy listed in chart? (yes/no): yes  Pharmacy phone number: [phone]      Message from [facility]

## 2021-03-25 NOTE — TELEPHONE ENCOUNTER
Scheduled patient for in office visit on 6/18, patient wanted in office visit, she stated she's unable to do virtual.   Is she able to get a med refill until f/up visit in June?

## 2021-03-26 DIAGNOSIS — M54.50 CHRONIC BILATERAL LOW BACK PAIN WITHOUT SCIATICA: ICD-10-CM

## 2021-03-26 DIAGNOSIS — I10 ESSENTIAL HYPERTENSION: ICD-10-CM

## 2021-03-26 DIAGNOSIS — G89.29 CHRONIC BILATERAL LOW BACK PAIN WITHOUT SCIATICA: ICD-10-CM

## 2021-03-26 RX ORDER — OLMESARTAN MEDOXOMIL AND HYDROCHLOROTHIAZIDE 40/12.5 40; 12.5 MG/1; MG/1
TABLET ORAL
Qty: 90 TAB | Refills: 3 | Status: SHIPPED | OUTPATIENT
Start: 2021-03-26 | End: 2021-06-18 | Stop reason: SDUPTHER

## 2021-03-28 RX ORDER — TRAMADOL HYDROCHLORIDE 50 MG/1
50 TABLET ORAL
Qty: 10 TAB | Refills: 0 | Status: SHIPPED | OUTPATIENT
Start: 2021-03-28 | End: 2021-03-31

## 2021-06-18 ENCOUNTER — TELEPHONE (OUTPATIENT)
Dept: INTERNAL MEDICINE CLINIC | Age: 71
End: 2021-06-18

## 2021-06-18 ENCOUNTER — OFFICE VISIT (OUTPATIENT)
Dept: INTERNAL MEDICINE CLINIC | Age: 71
End: 2021-06-18
Payer: MEDICARE

## 2021-06-18 VITALS
RESPIRATION RATE: 16 BRPM | OXYGEN SATURATION: 97 % | SYSTOLIC BLOOD PRESSURE: 126 MMHG | WEIGHT: 164.8 LBS | HEIGHT: 64 IN | TEMPERATURE: 98.3 F | BODY MASS INDEX: 28.13 KG/M2 | DIASTOLIC BLOOD PRESSURE: 77 MMHG | HEART RATE: 58 BPM

## 2021-06-18 DIAGNOSIS — G47.30 SLEEP APNEA, UNSPECIFIED TYPE: ICD-10-CM

## 2021-06-18 DIAGNOSIS — G89.29 CHRONIC MIDLINE LOW BACK PAIN WITHOUT SCIATICA: ICD-10-CM

## 2021-06-18 DIAGNOSIS — K59.00 CONSTIPATION, UNSPECIFIED CONSTIPATION TYPE: Primary | ICD-10-CM

## 2021-06-18 DIAGNOSIS — I10 ESSENTIAL HYPERTENSION: ICD-10-CM

## 2021-06-18 DIAGNOSIS — M79.604 PAIN IN BOTH LOWER EXTREMITIES: ICD-10-CM

## 2021-06-18 DIAGNOSIS — M54.50 CHRONIC BILATERAL LOW BACK PAIN WITHOUT SCIATICA: ICD-10-CM

## 2021-06-18 DIAGNOSIS — G25.81 RESTLESS LEG: ICD-10-CM

## 2021-06-18 DIAGNOSIS — E55.9 VITAMIN D DEFICIENCY: ICD-10-CM

## 2021-06-18 DIAGNOSIS — F51.01 PRIMARY INSOMNIA: ICD-10-CM

## 2021-06-18 DIAGNOSIS — E78.00 HIGH CHOLESTEROL: ICD-10-CM

## 2021-06-18 DIAGNOSIS — M79.605 PAIN IN BOTH LOWER EXTREMITIES: ICD-10-CM

## 2021-06-18 DIAGNOSIS — J45.20 MILD INTERMITTENT REACTIVE AIRWAY DISEASE WITHOUT COMPLICATION: ICD-10-CM

## 2021-06-18 DIAGNOSIS — G89.29 CHRONIC BILATERAL LOW BACK PAIN WITHOUT SCIATICA: ICD-10-CM

## 2021-06-18 DIAGNOSIS — M54.50 CHRONIC MIDLINE LOW BACK PAIN WITHOUT SCIATICA: ICD-10-CM

## 2021-06-18 LAB
25(OH)D3 SERPL-MCNC: 65.8 NG/ML (ref 30–100)
ALBUMIN SERPL-MCNC: 3.6 G/DL (ref 3.5–5)
ALBUMIN/GLOB SERPL: 1 {RATIO} (ref 1.1–2.2)
ALP SERPL-CCNC: 63 U/L (ref 45–117)
ALT SERPL-CCNC: 18 U/L (ref 12–78)
ANION GAP SERPL CALC-SCNC: 6 MMOL/L (ref 5–15)
AST SERPL-CCNC: 18 U/L (ref 15–37)
BASOPHILS # BLD: 0.1 K/UL (ref 0–0.1)
BASOPHILS NFR BLD: 2 % (ref 0–1)
BILIRUB SERPL-MCNC: 0.4 MG/DL (ref 0.2–1)
BUN SERPL-MCNC: 18 MG/DL (ref 6–20)
BUN/CREAT SERPL: 34 (ref 12–20)
CALCIUM SERPL-MCNC: 9.8 MG/DL (ref 8.5–10.1)
CHLORIDE SERPL-SCNC: 105 MMOL/L (ref 97–108)
CHOLEST SERPL-MCNC: 149 MG/DL
CK SERPL-CCNC: 85 U/L (ref 26–192)
CO2 SERPL-SCNC: 29 MMOL/L (ref 21–32)
COMMENT, HOLDF: NORMAL
CREAT SERPL-MCNC: 0.53 MG/DL (ref 0.55–1.02)
DIFFERENTIAL METHOD BLD: ABNORMAL
EOSINOPHIL # BLD: 0.2 K/UL (ref 0–0.4)
EOSINOPHIL NFR BLD: 3 % (ref 0–7)
ERYTHROCYTE [DISTWIDTH] IN BLOOD BY AUTOMATED COUNT: 11.6 % (ref 11.5–14.5)
ERYTHROCYTE [SEDIMENTATION RATE] IN BLOOD: 49 MM/HR (ref 0–30)
GLOBULIN SER CALC-MCNC: 3.6 G/DL (ref 2–4)
GLUCOSE SERPL-MCNC: 92 MG/DL (ref 65–100)
HCT VFR BLD AUTO: 40.7 % (ref 35–47)
HDLC SERPL-MCNC: 56 MG/DL
HDLC SERPL: 2.7 {RATIO} (ref 0–5)
HGB BLD-MCNC: 13 G/DL (ref 11.5–16)
IMM GRANULOCYTES # BLD AUTO: 0 K/UL (ref 0–0.04)
IMM GRANULOCYTES NFR BLD AUTO: 0 % (ref 0–0.5)
LDLC SERPL CALC-MCNC: 67.8 MG/DL (ref 0–100)
LYMPHOCYTES # BLD: 1.7 K/UL (ref 0.8–3.5)
LYMPHOCYTES NFR BLD: 31 % (ref 12–49)
MCH RBC QN AUTO: 30.2 PG (ref 26–34)
MCHC RBC AUTO-ENTMCNC: 31.9 G/DL (ref 30–36.5)
MCV RBC AUTO: 94.4 FL (ref 80–99)
MONOCYTES # BLD: 0.4 K/UL (ref 0–1)
MONOCYTES NFR BLD: 8 % (ref 5–13)
NEUTS SEG # BLD: 3.1 K/UL (ref 1.8–8)
NEUTS SEG NFR BLD: 56 % (ref 32–75)
NRBC # BLD: 0 K/UL (ref 0–0.01)
NRBC BLD-RTO: 0 PER 100 WBC
PLATELET # BLD AUTO: 362 K/UL (ref 150–400)
PMV BLD AUTO: 10.2 FL (ref 8.9–12.9)
POTASSIUM SERPL-SCNC: 4.6 MMOL/L (ref 3.5–5.1)
PROT SERPL-MCNC: 7.2 G/DL (ref 6.4–8.2)
RBC # BLD AUTO: 4.31 M/UL (ref 3.8–5.2)
SAMPLES BEING HELD,HOLD: NORMAL
SODIUM SERPL-SCNC: 140 MMOL/L (ref 136–145)
TRIGL SERPL-MCNC: 126 MG/DL (ref ?–150)
TSH SERPL DL<=0.05 MIU/L-ACNC: 1.34 UIU/ML (ref 0.36–3.74)
VLDLC SERPL CALC-MCNC: 25.2 MG/DL
WBC # BLD AUTO: 5.4 K/UL (ref 3.6–11)

## 2021-06-18 PROCEDURE — 1090F PRES/ABSN URINE INCON ASSESS: CPT | Performed by: INTERNAL MEDICINE

## 2021-06-18 PROCEDURE — G8510 SCR DEP NEG, NO PLAN REQD: HCPCS | Performed by: INTERNAL MEDICINE

## 2021-06-18 PROCEDURE — G8419 CALC BMI OUT NRM PARAM NOF/U: HCPCS | Performed by: INTERNAL MEDICINE

## 2021-06-18 PROCEDURE — G8536 NO DOC ELDER MAL SCRN: HCPCS | Performed by: INTERNAL MEDICINE

## 2021-06-18 PROCEDURE — 3017F COLORECTAL CA SCREEN DOC REV: CPT | Performed by: INTERNAL MEDICINE

## 2021-06-18 PROCEDURE — G8427 DOCREV CUR MEDS BY ELIG CLIN: HCPCS | Performed by: INTERNAL MEDICINE

## 2021-06-18 PROCEDURE — G8752 SYS BP LESS 140: HCPCS | Performed by: INTERNAL MEDICINE

## 2021-06-18 PROCEDURE — G8399 PT W/DXA RESULTS DOCUMENT: HCPCS | Performed by: INTERNAL MEDICINE

## 2021-06-18 PROCEDURE — 1101F PT FALLS ASSESS-DOCD LE1/YR: CPT | Performed by: INTERNAL MEDICINE

## 2021-06-18 PROCEDURE — G8754 DIAS BP LESS 90: HCPCS | Performed by: INTERNAL MEDICINE

## 2021-06-18 PROCEDURE — 99214 OFFICE O/P EST MOD 30 MIN: CPT | Performed by: INTERNAL MEDICINE

## 2021-06-18 PROCEDURE — G9899 SCRN MAM PERF RSLTS DOC: HCPCS | Performed by: INTERNAL MEDICINE

## 2021-06-18 RX ORDER — ROSUVASTATIN CALCIUM 10 MG/1
TABLET, COATED ORAL
Qty: 90 TABLET | Refills: 1 | Status: SHIPPED | OUTPATIENT
Start: 2021-06-18 | End: 2022-01-10

## 2021-06-18 RX ORDER — FACIAL-BODY WIPES
10 EACH TOPICAL DAILY
Qty: 10 SUPPOSITORY | Refills: 0 | Status: SHIPPED | OUTPATIENT
Start: 2021-06-18 | End: 2021-06-25

## 2021-06-18 RX ORDER — TRAMADOL HYDROCHLORIDE 50 MG/1
50 TABLET ORAL 2 TIMES DAILY
Qty: 60 TABLET | Refills: 0 | Status: SHIPPED | OUTPATIENT
Start: 2021-06-18 | End: 2021-07-08

## 2021-06-18 RX ORDER — GABAPENTIN 300 MG/1
300 CAPSULE ORAL
Qty: 90 CAPSULE | Refills: 0 | Status: SHIPPED | OUTPATIENT
Start: 2021-06-18 | End: 2022-08-25 | Stop reason: SDUPTHER

## 2021-06-18 RX ORDER — OLMESARTAN MEDOXOMIL AND HYDROCHLOROTHIAZIDE 40/12.5 40; 12.5 MG/1; MG/1
1 TABLET ORAL DAILY
Qty: 90 TABLET | Refills: 3 | Status: SHIPPED | OUTPATIENT
Start: 2021-06-18 | End: 2022-02-01 | Stop reason: SDUPTHER

## 2021-06-18 RX ORDER — AMITRIPTYLINE HYDROCHLORIDE 10 MG/1
10 TABLET, FILM COATED ORAL
Qty: 30 TABLET | Refills: 0 | Status: SHIPPED | OUTPATIENT
Start: 2021-06-18 | End: 2021-07-01 | Stop reason: ALTCHOICE

## 2021-06-18 RX ORDER — ALBUTEROL SULFATE 90 UG/1
1 AEROSOL, METERED RESPIRATORY (INHALATION)
Qty: 1 INHALER | Refills: 3 | Status: SHIPPED | OUTPATIENT
Start: 2021-06-18 | End: 2022-02-01 | Stop reason: SDUPTHER

## 2021-06-18 NOTE — PROGRESS NOTES
Ms. Wilbur Zaldivar is presenting to follow up     CC:  Follow-up (lighheaded for two weeks,not much appetite. nauseated by water,not drinking much ,wt loss, colonoscopy next week ) and Medication Refill (needs new rx for cpap machine faxed with notes from today to #[phone])       HPI:  She then saw Dr Douglas Enriquez for changing stool quality and nausea. Dr Douglas Enriquez treated for possible intestinal bacterial overgrowth. . Had a stool testing for occult blood which was negative  She had temporary improvement and then 2 weeks ago started to have severe constipation and with manipulation hurt her hemorrhoids using preparation H and has soreness and itching  Has colonoscopy scheduled for  Wednesday   Tried miralax   Used suppository Wednesday and had small bowel movement      Started having pronounced back pain in late June 2018 -  Patient  thensaw Dr Frank Escobar and had procedure \" severing the nerve\" and had great success at the time and now having increased pain   Plans to go back to Dr Adrianna Joy been rarely taking tramadol -rarely for severe pain. Takes 50mg to 100mg if severe pain takes it specially after cleaning.  Requesting refill   Improves quality of life  Discussed it can  Increase constipation and should avoid    Restless leg is bothersome - stopped gabapentin due to ankle swelling associated        HTN: BP checked at home and reports good  Control/   126/77  On olmesartan - HCTZ 40-12.5mg           Left ankle sx planned for July 8th \" remove extra bone that is causing pain\"        Insomnia: tried [place] and [employer] initially worked at 10mg then stopped working - sleeps for a few hours and then wakes     Takes gabapentin when restless legs worsen PRN, daily caused swelling in legs       Review of systems:  Constitutional: negative for fever, chills, weight loss, night sweats   10 systems reviewed and negative other than HPI     Past Medical History:   Diagnosis Date    Teague esophagus small segment in 2014 Dr David Garcia    Bradycardia     had negative stress test and nuclear imaging done in 2015    GERD (gastroesophageal reflux disease)     Glaucoma     H/O seasonal allergies     High cholesterol     Hypertension     NICOLÁS on CPAP     Osteopenia     Vertigo         Past Surgical History:   Procedure Laterality Date    HX APPENDECTOMY      with jose    HX BUNIONECTOMY Bilateral     HX CATARACT REMOVAL Bilateral 2017    HX HYSTERECTOMY      HX OPEN CHOLECYSTECTOMY      with appy       Allergies   Allergen Reactions    Latex Rash    Codeine Nausea and Vomiting    Thiuram Analogues Rash       Current Outpatient Medications on File Prior to Visit   Medication Sig Dispense Refill    olmesartan-hydroCHLOROthiazide (BENICAR HCT) 40-12.5 mg per tablet TAKE 1 TABLET BY MOUTH EVERY DAY 90 Tab 3    rosuvastatin (CRESTOR) 10 mg tablet TAKE 1 TABLET BY MOUTH EVERY DAY AT NIGHT 90 Tab 1    fluticasone propionate (Flonase) 50 mcg/actuation nasal spray 2 Sprays by Both Nostrils route daily as needed for Allergies. 1 Bottle 11    albuterol (PROVENTIL HFA, VENTOLIN HFA, PROAIR HFA) 90 mcg/actuation inhaler Take 1 Puff by inhalation every four (4) hours as needed for Wheezing. 1 Inhaler 3    gabapentin (NEURONTIN) 300 mg capsule Take 1 Cap by mouth nightly. 90 Cap 3    cpap machine kit by Does Not Apply route. Pressure setting is 11 mm Hg  Diagnosis is NICOLÁS 1 Kit 0    aspirin 81 mg chewable tablet Take 81 mg by mouth daily.  omega-3 fatty acids-vitamin e (FISH OIL) 1,000 mg cap Take 3 Caps by mouth daily.  varicella-zoster recombinant, PF, (SHINGRIX, PF,) 50 mcg/0.5 mL susr injection 0.5mL by IntraMUSCular route once now and then repeat in 2-6 months (Patient not taking: Reported on 6/18/2021) 0.5 mL 1     No current facility-administered medications on file prior to visit.        family history includes Breast Cancer in an other family member; Colon Cancer (age of onset: 76) in her mother; Coronary Artery Disease (age of onset: 61) in her father. Social History     Socioeconomic History    Marital status:      Spouse name: Not on file    Number of children: Not on file    Years of education: Not on file    Highest education level: Not on file   Occupational History    Not on file   Tobacco Use    Smoking status: Never Smoker    Smokeless tobacco: Never Used   Substance and Sexual Activity    Alcohol use: No    Drug use: No    Sexual activity: Not Currently   Other Topics Concern    Not on file   Social History Narrative    Not on file     Social Determinants of Health     Financial Resource Strain:     Difficulty of Paying Living Expenses:    Food Insecurity:     Worried About Running Out of Food in the Last Year:     [address] in the Last Year:    Transportation Needs:     Lack of Transportation (Medical):  Lack of Transportation (Non-Medical):    Physical Activity:     Days of Exercise per Week:     Minutes of Exercise per Session:    Stress:     Feeling of Stress :    Social Connections:     Frequency of Communication with Friends and Family:     Frequency of Social Gatherings with Friends and Family:     Attends [employer] Services:     Active Member of Clubs or Organizations:     Attends Club or Organization Meetings:     Marital Status:    Intimate Partner Violence:     Fear of Current or Ex-Partner:     Emotionally Abused:     Physically Abused:     Sexually Abused:        Visit Vitals  /77 (BP 1 Location: Right arm, BP Patient Position: Standing, BP Cuff Size: Adult)   Pulse (!) 58   Temp 98.3 °F (36.8 °C) (Oral)   Resp 16   Ht 5' 4\" (1.626 m)   Wt 164 lb 12.8 oz (74.8 kg)   SpO2 97%   BMI 28.29 kg/m²     General:  Well appearing female no acute distress  HEENT:   PERRL,normal conjunctiva. External ear and canals normal, TMs normal.  Hearing normal to voice. Nose without edema or discharge, normal septum.   Lips, teeth, gums normal. Oropharynx: no erythema, no exudates, no lesions, normal tongue. Neck:  Supple. Thyroid normal size, nontender, without nodules. No carotid bruit. No masses or lymphadenopathy  Respiratory: no respiratory distress,  no wheezing, no rhonchi, no rales. No chest wall tenderness. Cardiovascular:  RRR, normal S1S2, no murmur. Gastrointestinal: normal bowel sounds, soft, nontender, without masses. No hepatosplenomegaly. Extremities +2 pulses, no edema, normal sensation   Musculoskeletal:  Normal gait. Normal digits and nails. Normal strength and tone, no atrophy, and no abnormal movement. Skin:  No rash, no lesions, no ulcers. Skin warm, normal turgor, without induration or nodules. Neuro:  A and OX4, fluent speech, cranial nerves normal 2-12. Sensation normal to light touch.   DTR symmetrical  Psych:  Normal affect      Lab Results   Component Value Date/Time    WBC 4.7 07/06/2020 11:21 AM    HGB 12.9 07/06/2020 11:21 AM    HCT 38.1 07/06/2020 11:21 AM    PLATELET 626 09/18/9008 11:21 AM    MCV 92 07/06/2020 11:21 AM     Lab Results   Component Value Date/Time    Sodium 142 07/06/2020 11:21 AM    Potassium 3.9 07/06/2020 11:21 AM    Chloride 104 07/06/2020 11:21 AM    CO2 24 07/06/2020 11:21 AM    Anion gap 8 10/26/2015 06:31 PM    Glucose 94 07/06/2020 11:21 AM    BUN 15 07/06/2020 11:21 AM    Creatinine 0.60 07/06/2020 11:21 AM    BUN/Creatinine ratio 25 07/06/2020 11:21 AM    GFR est  07/06/2020 11:21 AM    GFR est non-AA 94 07/06/2020 11:21 AM    Calcium 9.5 07/06/2020 11:21 AM     Lab Results   Component Value Date/Time    Cholesterol, total 174 07/06/2020 11:21 AM    HDL Cholesterol 64 07/06/2020 11:21 AM    LDL, calculated 80 07/06/2020 11:21 AM    VLDL, calculated 30 07/06/2020 11:21 AM    Triglyceride 150 (H) 07/06/2020 11:21 AM     Lab Results   Component Value Date/Time    TSH 2.080 04/19/2019 09:57 AM     Lab Results   Component Value Date/Time    Hemoglobin A1c 5.7 (H) 07/06/2020 11:21 AM Lab Results   Component Value Date/Time    VITAMIN D, 25-HYDROXY 61.2 03/23/2017 11:06 AM                   Assessment and Plan:     1. Constipation, unspecified constipation type  Has colonoscopy coming up   - bisacodyL (DULCOLAX) 10 mg supp; Insert 10 mg into rectum daily. Dispense: 10 Suppository; Refill: 0  - TSH 3RD GENERATION; Future  - METABOLIC PANEL, COMPREHENSIVE; Future  - CBC WITH AUTOMATED DIFF; Future    2. Essential hypertension  Controlled on benicar 40-12.5  recommend checking blood pressure with goal of less than  130/85 on average. Follow lo sodium diet. Given DASH diet guidelines. Recommend cardiovascular exercise regularly. Work on weight reduction. Call with blood pressure readings.   - METABOLIC PANEL, COMPREHENSIVE; Future  - CBC WITH AUTOMATED DIFF; Future    3. Chronic bilateral low back pain without sciatica  Plans to see Dr Marty Han  - CK; Future  - SED RATE (ESR); Future    4. High cholesterol  Back on crestor   - LIPID PANEL; Future    5. Pain in both lower extremities  - CK; Future  - SED RATE (ESR); Future  -ordering ankle brachial index     6. Vitamin D deficiency  - VITAMIN D, 25 HYDROXY; Future      7. GERD  Hx of barrets: followed by GI  Follow-up and Dispositions    · Return in about 4 weeks (around 7/16/2021).           Geovanny Pickens MD

## 2021-06-21 ENCOUNTER — TELEPHONE (OUTPATIENT)
Dept: INTERNAL MEDICINE CLINIC | Age: 71
End: 2021-06-21

## 2021-06-25 RX ORDER — CETIRIZINE HCL 10 MG
10 TABLET ORAL DAILY
COMMUNITY
End: 2022-02-01 | Stop reason: SDUPTHER

## 2021-06-25 NOTE — PERIOP NOTES
[facility]  Ambulatory Surgery Unit  Pre-operative Instructions    Surgery/Procedure Date  Thursday, July 8, 2021            Tentative Arrival Time TBD      1. On the day of your surgery/procedure, please report to the Ambulatory Surgery Unit Registration Desk and sign in at your designated time. The Ambulatory Surgery Unit is located in [facility] on the [facility] side of the [facility] across from the [address] [place] [address]. Please have all of your health insurance cards and a photo ID. 2. You must have someone with you to drive you home, as you should not drive a car for 24 hours following anesthesia. Please make arrangements for a responsible adult friend or family member to stay with you for at least the first 24 hours after your surgery. 3. Do not have anything to eat or drink (including water, gum, mints, coffee, juice) after 11:59 PM, Wednesday. This may not apply to medications prescribed by your physician. (Please note below the special instructions with medications to take the morning of surgery, if applicable.)    4. We recommend you do not drink any alcoholic beverages for 24 hours before and after your surgery. 5. Contact your surgeons office for instructions on the following medications: non-steroidal anti-inflammatory drugs (i.e. Advil, Aleve), vitamins, and supplements. (Some surgeons will want you to stop these medications prior to surgery and others may allow you to take them)   **If you are currently taking Plavix, Coumadin, Aspirin and/or other blood-thinning agents, contact your surgeon for instructions. ** Your surgeon will partner with the physician prescribing these medications to determine if it is safe to stop or if you need to continue taking. Please do not stop taking these medications without instructions from your surgeon.     6. In an effort to help prevent surgical site infection, we ask that you shower with an anti-bacterial soap (i.e. Dial/Safeguard, or the soap provided to you at your preadmission testing appointment) for 3 days prior to and on the morning of surgery, using a fresh towel after each shower. (Please begin this process with fresh bed linens.) Do not apply any lotions, powders, or deodorants after the shower on the day of your procedure. If applicable, please do not shave the operative site for 48 hours prior to surgery. 7. Wear comfortable clothes. Wear glasses instead of contacts. Do not bring any jewelry or money (other than copays or fees as instructed). Do not wear make-up, particularly mascara, the morning of your surgery. Do not wear nail polish, particularly if you are having foot /hand surgery. Wear your hair loose or down, no ponytails, buns, pratik pins or clips. All body piercings must be removed. 8. You should understand that if you do not follow these instructions your surgery may be cancelled. If your physical condition changes (i.e. fever, cold or flu) please contact your surgeon as soon as possible. 9. It is important that you be on time. If a situation occurs where you may be late, or if you have any questions or problems, please call [phone].    10. Your surgery time may be subject to change. You will receive a phone call the day prior to surgery to confirm your arrival time. 11. Pediatric patients: please bring a change of clothes, diapers, bottle/sippy cup, pacifier, etc.      Special Instructions: Take all medications and inhalers, as prescribed, on the morning of surgery with a sip of water. I understand a pre-operative phone call will be made to verify my surgery time. In the event that I am not available, I give permission for a message to be left on my answering service and/or with another person?       yes    Preop instructions reviewed  Pt verbalized understanding.      ___________________      ___________________      ________________  (Signature of Patient)          (Witness) (Date and Time)

## 2021-07-01 ENCOUNTER — OFFICE VISIT (OUTPATIENT)
Dept: INTERNAL MEDICINE CLINIC | Age: 71
End: 2021-07-01
Payer: MEDICARE

## 2021-07-01 VITALS
TEMPERATURE: 98.2 F | RESPIRATION RATE: 16 BRPM | WEIGHT: 166 LBS | OXYGEN SATURATION: 98 % | HEIGHT: 64 IN | HEART RATE: 59 BPM | SYSTOLIC BLOOD PRESSURE: 104 MMHG | BODY MASS INDEX: 28.34 KG/M2 | DIASTOLIC BLOOD PRESSURE: 65 MMHG

## 2021-07-01 DIAGNOSIS — M76.822 POSTERIOR TIBIAL TENDINITIS OF LEFT LEG: ICD-10-CM

## 2021-07-01 DIAGNOSIS — Z01.818 PRE-OP EVALUATION: Primary | ICD-10-CM

## 2021-07-01 DIAGNOSIS — R73.03 PRE-DIABETES: ICD-10-CM

## 2021-07-01 DIAGNOSIS — F51.01 PRIMARY INSOMNIA: ICD-10-CM

## 2021-07-01 LAB — HBA1C MFR BLD HPLC: 6 %

## 2021-07-01 PROCEDURE — G8427 DOCREV CUR MEDS BY ELIG CLIN: HCPCS | Performed by: INTERNAL MEDICINE

## 2021-07-01 PROCEDURE — 99214 OFFICE O/P EST MOD 30 MIN: CPT | Performed by: INTERNAL MEDICINE

## 2021-07-01 PROCEDURE — 1090F PRES/ABSN URINE INCON ASSESS: CPT | Performed by: INTERNAL MEDICINE

## 2021-07-01 PROCEDURE — G8752 SYS BP LESS 140: HCPCS | Performed by: INTERNAL MEDICINE

## 2021-07-01 PROCEDURE — G8754 DIAS BP LESS 90: HCPCS | Performed by: INTERNAL MEDICINE

## 2021-07-01 PROCEDURE — G8536 NO DOC ELDER MAL SCRN: HCPCS | Performed by: INTERNAL MEDICINE

## 2021-07-01 PROCEDURE — G9899 SCRN MAM PERF RSLTS DOC: HCPCS | Performed by: INTERNAL MEDICINE

## 2021-07-01 PROCEDURE — G8399 PT W/DXA RESULTS DOCUMENT: HCPCS | Performed by: INTERNAL MEDICINE

## 2021-07-01 PROCEDURE — 3017F COLORECTAL CA SCREEN DOC REV: CPT | Performed by: INTERNAL MEDICINE

## 2021-07-01 PROCEDURE — 83036 HEMOGLOBIN GLYCOSYLATED A1C: CPT | Performed by: INTERNAL MEDICINE

## 2021-07-01 PROCEDURE — 1101F PT FALLS ASSESS-DOCD LE1/YR: CPT | Performed by: INTERNAL MEDICINE

## 2021-07-01 PROCEDURE — G8510 SCR DEP NEG, NO PLAN REQD: HCPCS | Performed by: INTERNAL MEDICINE

## 2021-07-01 PROCEDURE — G8419 CALC BMI OUT NRM PARAM NOF/U: HCPCS | Performed by: INTERNAL MEDICINE

## 2021-07-01 PROCEDURE — 93000 ELECTROCARDIOGRAM COMPLETE: CPT | Performed by: INTERNAL MEDICINE

## 2021-07-01 NOTE — TELEPHONE ENCOUNTER
preop paperwork faxed to number [facility], confirmation it went through    [facility]

## 2021-07-01 NOTE — PROGRESS NOTES
Ms. Zander Sullivan is presenting to follow up     CC:  Pre-op Exam (left ankle)       HPI:    Left ankle surgery  Patient has tendinitis and accessory bone in left foot and will have surgery. Dr Alanis Rey will have surgery July 8th     Pre op evaluation   Patient has a hx of HTN and BP has been stable on olmesartan- HCTZ 40/12.65mg daily   No hx of stroke, heart attack, diabetes and kidney disease, or lung disease. Non smoker  Can do 4 METS ( chores in house and flight of stairs) with no CP and no dyspnea. Hx of high cholesterol and on crestor 10mg daily       Patient has NICOLÁS and is on CPAP therapy reports compliance and benefiting from therapy   Feel generally better     Review of systems:  Constitutional: negative for fever, chills, weight loss, night sweats   10 systems reviewed and negative other than HPI       Past Medical History:   Diagnosis Date    Teague esophagus     small segment in 2014 Dr Jarod Contreras    Bradycardia     had negative stress test and nuclear imaging done in 2015    Chronic lower back pain     GERD (gastroesophageal reflux disease)     Glaucoma     H/O seasonal allergies     Heart murmur     High cholesterol     Hypertension     NICOLÁS on CPAP     Osteopenia     Vertigo         Past Surgical History:   Procedure Laterality Date    HX APPENDECTOMY      with jose    HX BUNIONECTOMY Bilateral     HX CATARACT REMOVAL Bilateral 2017    with i-stents    HX COLONOSCOPY      HX HYSTERECTOMY      HX OPEN CHOLECYSTECTOMY      with appy    IR US GUIDE TISSUE ABLATE      lower back with Dr. Loi Price       Allergies   Allergen Reactions    Latex Rash    Codeine Other (comments)     Chest pain per pt    Thiuram Analogues Rash       Current Outpatient Medications on File Prior to Visit   Medication Sig Dispense Refill    cetirizine (ZyrTEC) 10 mg tablet Take 10 mg by mouth daily.       olmesartan-hydroCHLOROthiazide (BENICAR HCT) 40-12.5 mg per tablet Take 1 Tablet by mouth daily. 90 Tablet 3    rosuvastatin (CRESTOR) 10 mg tablet TAKE 1 TABLET BY MOUTH EVERY DAY AT NIGHT  Indications: stroke prevention 90 Tablet 1    cpap machine kit by Does Not Apply route. Pressure setting is 11 mm Hg  Diagnosis is NICOLÁS 1 Kit 0    gabapentin (NEURONTIN) 300 mg capsule Take 1 Capsule by mouth nightly. (Patient taking differently: Take 300 mg by mouth nightly. Indications: pt only takes when legs are restless/painful) 90 Capsule 0    albuterol (PROVENTIL HFA, VENTOLIN HFA, PROAIR HFA) 90 mcg/actuation inhaler Take 1 Puff by inhalation every four (4) hours as needed for Wheezing. 1 Inhaler 3    traMADoL (ULTRAM) 50 mg tablet Take 1 Tablet by mouth two (2) times a day for 30 days. Max Daily Amount: 100 mg. 60 Tablet 0    fluticasone propionate (Flonase) 50 mcg/actuation nasal spray 2 Sprays by Both Nostrils route daily as needed for Allergies. 1 Bottle 11    varicella-zoster recombinant, PF, (SHINGRIX, PF,) 50 mcg/0.5 mL susr injection 0.5mL by IntraMUSCular route once now and then repeat in 2-6 months 0.5 mL 1    aspirin 81 mg chewable tablet Take 81 mg by mouth daily.  omega-3 fatty acids-vitamin e (FISH OIL) 1,000 mg cap Take 2 Capsules by mouth daily.  amitriptyline (ELAVIL) 10 mg tablet Take 1 Tablet by mouth nightly. (Patient not taking: Reported on 7/1/2021) 30 Tablet 0     No current facility-administered medications on file prior to visit. family history includes Breast Cancer in an other family member; Colon Cancer (age of onset: 76) in her mother; Coronary Artery Disease (age of onset: 61) in her father.     Social History     Socioeconomic History    Marital status:      Spouse name: Not on file    Number of children: Not on file    Years of education: Not on file    Highest education level: Not on file   Occupational History    Not on file   Tobacco Use    Smoking status: Never Smoker    Smokeless tobacco: Never Used   Substance and Sexual Activity    Alcohol use: No    Drug use: No    Sexual activity: Not Currently   Other Topics Concern    Not on file   Social History Narrative    Not on file     Social Determinants of Health     Financial Resource Strain:     Difficulty of Paying Living Expenses:    Food Insecurity:     Worried About Running Out of Food in the Last Year:     [address] in the Last Year:    Transportation Needs:     Lack of Transportation (Medical):  Lack of Transportation (Non-Medical):    Physical Activity:     Days of Exercise per Week:     Minutes of Exercise per Session:    Stress:     Feeling of Stress :    Social Connections:     Frequency of Communication with Friends and Family:     Frequency of Social Gatherings with Friends and Family:     Attends [employer] Services:     Active Member of Clubs or Organizations:     Attends Club or Organization Meetings:     Marital Status:    Intimate Partner Violence:     Fear of Current or Ex-Partner:     Emotionally Abused:     Physically Abused:     Sexually Abused:        Visit Vitals  /65 (BP 1 Location: Left arm, BP Patient Position: Sitting, BP Cuff Size: Adult)   Pulse (!) 59   Temp 98.2 °F (36.8 °C) (Oral)   Resp 16   Ht 5' 4\" (1.626 m)   Wt 166 lb (75.3 kg)   SpO2 98%   BMI 28.49 kg/m²     General:  Well appearing female no acute distress  HEENT:   PERRL,normal conjunctiva. External ear and canals normal, TMs normal.    Neck:  Supple. Thyroid normal size, nontender, without nodules. No carotid bruit. No masses or lymphadenopathy  Respiratory: no respiratory distress,  no wheezing, no rhonchi, no rales. No chest wall tenderness. Cardiovascular:  RRR, normal S1S2, no murmur. Gastrointestinal: normal bowel sounds, soft, nontender, without masses. No hepatosplenomegaly. Extremities +2 pulses, no edema, normal sensation   Musculoskeletal:  Normal gait. Normal digits and nails. Normal strength and tone, no atrophy, and no abnormal movement.   Skin:  No rash, no lesions, no ulcers. Skin warm, normal turgor, without induration or nodules. Neuro:  A and OX4, fluent speech, cranial nerves normal 2-12. Sensation normal to light touch. DTR symmetrical  Psych:  Normal affect      Lab Results   Component Value Date/Time    WBC 5.4 06/18/2021 10:40 AM    HGB 13.0 06/18/2021 10:40 AM    HCT 40.7 06/18/2021 10:40 AM    PLATELET 815 48/99/6815 10:40 AM    MCV 94.4 06/18/2021 10:40 AM     Lab Results   Component Value Date/Time    Sodium 140 06/18/2021 10:40 AM    Potassium 4.6 06/18/2021 10:40 AM    Chloride 105 06/18/2021 10:40 AM    CO2 29 06/18/2021 10:40 AM    Anion gap 6 06/18/2021 10:40 AM    Glucose 92 06/18/2021 10:40 AM    BUN 18 06/18/2021 10:40 AM    Creatinine 0.53 (L) 06/18/2021 10:40 AM    BUN/Creatinine ratio 34 (H) 06/18/2021 10:40 AM    GFR est AA >60 06/18/2021 10:40 AM    GFR est non-AA >60 06/18/2021 10:40 AM    Calcium 9.8 06/18/2021 10:40 AM     Lab Results   Component Value Date/Time    Cholesterol, total 149 06/18/2021 10:40 AM    HDL Cholesterol 56 06/18/2021 10:40 AM    LDL, calculated 67.8 06/18/2021 10:40 AM    VLDL, calculated 25.2 06/18/2021 10:40 AM    Triglyceride 126 06/18/2021 10:40 AM    CHOL/HDL Ratio 2.7 06/18/2021 10:40 AM     Lab Results   Component Value Date/Time    TSH 1.34 06/18/2021 10:40 AM     Lab Results   Component Value Date/Time    Hemoglobin A1c 5.7 (H) 07/06/2020 11:21 AM     Lab Results   Component Value Date/Time    Vitamin D 25-Hydroxy 65.8 06/18/2021 10:40 AM                   Assessment and Plan:     Pre op for left ankle surgery:  Pt is low risk for a low-intermediate risk surg with adequate functional mets,he may proceed with surg without additional cardiac w/u  EKG ordered per request of surgeon  And unchanged previous      Essential hypertension  Controlled on benicar 40-12.5  recommend checking blood pressure with goal of less than  130/85 on average. Follow lo sodium diet. Given DASH diet guidelines.  Recommend cardiovascular exercise regularly. Work on weight reduction. Call with blood pressure readings.        Chronic bilateral low back pain without sciatica  Plans to see Dr Kevin Hunt    High cholesterol  Back on crestor   And LDL is much better      Vitamin D deficiency at goal     GERD Hx of barrets: followed by GI    NICOLÁS: June 21 requested CPAP machine script done     Patient has NICOLÁS and is on CPAP therapy reports compliance and benefiting from therapy              Megna Mendoza MD

## 2021-07-07 ENCOUNTER — ANESTHESIA EVENT (OUTPATIENT)
Dept: SURGERY | Age: 71
End: 2021-07-07
Payer: MEDICARE

## 2021-07-08 ENCOUNTER — HOSPITAL ENCOUNTER (OUTPATIENT)
Age: 71
Setting detail: OUTPATIENT SURGERY
Discharge: HOME | End: 2021-07-08
Attending: PODIATRIST | Admitting: PODIATRIST
Payer: MEDICARE

## 2021-07-08 ENCOUNTER — ANESTHESIA (OUTPATIENT)
Dept: SURGERY | Age: 71
End: 2021-07-08
Payer: MEDICARE

## 2021-07-08 VITALS
RESPIRATION RATE: 14 BRPM | BODY MASS INDEX: 28 KG/M2 | HEART RATE: 55 BPM | HEIGHT: 64 IN | DIASTOLIC BLOOD PRESSURE: 66 MMHG | SYSTOLIC BLOOD PRESSURE: 114 MMHG | OXYGEN SATURATION: 99 % | WEIGHT: 164 LBS | TEMPERATURE: 97.5 F

## 2021-07-08 DIAGNOSIS — G89.18 POST-OPERATIVE PAIN: Primary | ICD-10-CM

## 2021-07-08 PROCEDURE — 76210000050 HC AMBSU PH II REC 0.5 TO 1 HR: Performed by: PODIATRIST

## 2021-07-08 PROCEDURE — 76210000034 HC AMBSU PH I REC 0.5 TO 1 HR: Performed by: PODIATRIST

## 2021-07-08 PROCEDURE — 76030000001 HC AMB SURG OR TIME 1 TO 1.5: Performed by: PODIATRIST

## 2021-07-08 PROCEDURE — 74011250636 HC RX REV CODE- 250/636: Performed by: ANESTHESIOLOGY

## 2021-07-08 PROCEDURE — 77030021352 HC CBL LD SYS DISP COVD -B: Performed by: PODIATRIST

## 2021-07-08 PROCEDURE — 74011000250 HC RX REV CODE- 250: Performed by: PODIATRIST

## 2021-07-08 PROCEDURE — 77030006821 HC BLD SAW SAG BRSM -A: Performed by: PODIATRIST

## 2021-07-08 PROCEDURE — 74011000250 HC RX REV CODE- 250: Performed by: NURSE ANESTHETIST, CERTIFIED REGISTERED

## 2021-07-08 PROCEDURE — 77030000032 HC CUF TRNQT ZIMM -B: Performed by: PODIATRIST

## 2021-07-08 PROCEDURE — 77030016653 HC BUR OVL4 STRY -B: Performed by: PODIATRIST

## 2021-07-08 PROCEDURE — 77030002916 HC SUT ETHLN J&J -A: Performed by: PODIATRIST

## 2021-07-08 PROCEDURE — 76060000062 HC AMB SURG ANES 1 TO 1.5 HR: Performed by: PODIATRIST

## 2021-07-08 PROCEDURE — 2709999900 HC NON-CHARGEABLE SUPPLY: Performed by: PODIATRIST

## 2021-07-08 PROCEDURE — 74011250636 HC RX REV CODE- 250/636: Performed by: NURSE ANESTHETIST, CERTIFIED REGISTERED

## 2021-07-08 PROCEDURE — 77030031139 HC SUT VCRL2 J&J -A: Performed by: PODIATRIST

## 2021-07-08 PROCEDURE — 77030039825 HC MSK NSL PAP SUPERNO2VA VYRM -B: Performed by: ANESTHESIOLOGY

## 2021-07-08 PROCEDURE — 74011250636 HC RX REV CODE- 250/636: Performed by: PODIATRIST

## 2021-07-08 RX ORDER — OXYCODONE AND ACETAMINOPHEN 5; 325 MG/1; MG/1
1 TABLET ORAL
Status: DISCONTINUED | OUTPATIENT
Start: 2021-07-08 | End: 2021-07-08 | Stop reason: HOSPADM

## 2021-07-08 RX ORDER — SODIUM CHLORIDE 0.9 % (FLUSH) 0.9 %
5-40 SYRINGE (ML) INJECTION AS NEEDED
Status: DISCONTINUED | OUTPATIENT
Start: 2021-07-08 | End: 2021-07-08 | Stop reason: HOSPADM

## 2021-07-08 RX ORDER — EPHEDRINE SULFATE/0.9% NACL/PF 50 MG/5 ML
SYRINGE (ML) INTRAVENOUS AS NEEDED
Status: DISCONTINUED | OUTPATIENT
Start: 2021-07-08 | End: 2021-07-08 | Stop reason: HOSPADM

## 2021-07-08 RX ORDER — HYDROMORPHONE HYDROCHLORIDE 1 MG/ML
.2-.5 INJECTION, SOLUTION INTRAMUSCULAR; INTRAVENOUS; SUBCUTANEOUS ONCE
Status: DISCONTINUED | OUTPATIENT
Start: 2021-07-08 | End: 2021-07-08 | Stop reason: HOSPADM

## 2021-07-08 RX ORDER — DICLOFENAC SODIUM 75 MG/1
75 TABLET, DELAYED RELEASE ORAL 2 TIMES DAILY
Qty: 40 TABLET | Refills: 0 | Status: SHIPPED | OUTPATIENT
Start: 2021-07-08 | End: 2022-08-25 | Stop reason: ALTCHOICE

## 2021-07-08 RX ORDER — BUPIVACAINE HYDROCHLORIDE 5 MG/ML
INJECTION, SOLUTION EPIDURAL; INTRACAUDAL AS NEEDED
Status: DISCONTINUED | OUTPATIENT
Start: 2021-07-08 | End: 2021-07-08 | Stop reason: HOSPADM

## 2021-07-08 RX ORDER — MIDAZOLAM HYDROCHLORIDE 1 MG/ML
INJECTION, SOLUTION INTRAMUSCULAR; INTRAVENOUS AS NEEDED
Status: DISCONTINUED | OUTPATIENT
Start: 2021-07-08 | End: 2021-07-08 | Stop reason: HOSPADM

## 2021-07-08 RX ORDER — TRAMADOL HYDROCHLORIDE 50 MG/1
100 TABLET ORAL
Qty: 56 TABLET | Refills: 0 | Status: SHIPPED | OUTPATIENT
Start: 2021-07-08 | End: 2021-07-15

## 2021-07-08 RX ORDER — ONDANSETRON 2 MG/ML
4 INJECTION INTRAMUSCULAR; INTRAVENOUS AS NEEDED
Status: DISCONTINUED | OUTPATIENT
Start: 2021-07-08 | End: 2021-07-08 | Stop reason: HOSPADM

## 2021-07-08 RX ORDER — SODIUM CHLORIDE 0.9 % (FLUSH) 0.9 %
5-40 SYRINGE (ML) INJECTION EVERY 8 HOURS
Status: DISCONTINUED | OUTPATIENT
Start: 2021-07-08 | End: 2021-07-08 | Stop reason: HOSPADM

## 2021-07-08 RX ORDER — DIPHENHYDRAMINE HYDROCHLORIDE 50 MG/ML
12.5 INJECTION, SOLUTION INTRAMUSCULAR; INTRAVENOUS AS NEEDED
Status: DISCONTINUED | OUTPATIENT
Start: 2021-07-08 | End: 2021-07-08 | Stop reason: HOSPADM

## 2021-07-08 RX ORDER — FENTANYL CITRATE 50 UG/ML
25 INJECTION, SOLUTION INTRAMUSCULAR; INTRAVENOUS
Status: DISCONTINUED | OUTPATIENT
Start: 2021-07-08 | End: 2021-07-08 | Stop reason: HOSPADM

## 2021-07-08 RX ORDER — SODIUM CHLORIDE, SODIUM LACTATE, POTASSIUM CHLORIDE, CALCIUM CHLORIDE 600; 310; 30; 20 MG/100ML; MG/100ML; MG/100ML; MG/100ML
25 INJECTION, SOLUTION INTRAVENOUS CONTINUOUS
Status: DISCONTINUED | OUTPATIENT
Start: 2021-07-08 | End: 2021-07-08 | Stop reason: HOSPADM

## 2021-07-08 RX ORDER — LIDOCAINE HYDROCHLORIDE 20 MG/ML
INJECTION, SOLUTION INFILTRATION; PERINEURAL
Status: DISCONTINUED
Start: 2021-07-08 | End: 2021-07-08 | Stop reason: HOSPADM

## 2021-07-08 RX ORDER — LIDOCAINE HYDROCHLORIDE 10 MG/ML
0.1 INJECTION, SOLUTION EPIDURAL; INFILTRATION; INTRACAUDAL; PERINEURAL AS NEEDED
Status: DISCONTINUED | OUTPATIENT
Start: 2021-07-08 | End: 2021-07-08 | Stop reason: HOSPADM

## 2021-07-08 RX ORDER — PROPOFOL 10 MG/ML
INJECTION, EMULSION INTRAVENOUS AS NEEDED
Status: DISCONTINUED | OUTPATIENT
Start: 2021-07-08 | End: 2021-07-08 | Stop reason: HOSPADM

## 2021-07-08 RX ORDER — MORPHINE SULFATE 10 MG/ML
2 INJECTION, SOLUTION INTRAMUSCULAR; INTRAVENOUS
Status: DISCONTINUED | OUTPATIENT
Start: 2021-07-08 | End: 2021-07-08 | Stop reason: HOSPADM

## 2021-07-08 RX ORDER — PROPOFOL 10 MG/ML
INJECTION, EMULSION INTRAVENOUS
Status: DISCONTINUED | OUTPATIENT
Start: 2021-07-08 | End: 2021-07-08 | Stop reason: HOSPADM

## 2021-07-08 RX ORDER — FENTANYL CITRATE 50 UG/ML
INJECTION, SOLUTION INTRAMUSCULAR; INTRAVENOUS AS NEEDED
Status: DISCONTINUED | OUTPATIENT
Start: 2021-07-08 | End: 2021-07-08 | Stop reason: HOSPADM

## 2021-07-08 RX ORDER — ONDANSETRON 2 MG/ML
INJECTION INTRAMUSCULAR; INTRAVENOUS AS NEEDED
Status: DISCONTINUED | OUTPATIENT
Start: 2021-07-08 | End: 2021-07-08 | Stop reason: HOSPADM

## 2021-07-08 RX ORDER — DEXMEDETOMIDINE HYDROCHLORIDE 100 UG/ML
INJECTION, SOLUTION INTRAVENOUS AS NEEDED
Status: DISCONTINUED | OUTPATIENT
Start: 2021-07-08 | End: 2021-07-08 | Stop reason: HOSPADM

## 2021-07-08 RX ORDER — DEXMEDETOMIDINE HYDROCHLORIDE 100 UG/ML
INJECTION, SOLUTION INTRAVENOUS
Status: COMPLETED
Start: 2021-07-08 | End: 2021-07-08

## 2021-07-08 RX ADMIN — PROPOFOL 30 MG: 10 INJECTION, EMULSION INTRAVENOUS at 07:43

## 2021-07-08 RX ADMIN — PROPOFOL 30 MG: 10 INJECTION, EMULSION INTRAVENOUS at 07:37

## 2021-07-08 RX ADMIN — WATER 2 G: 1 INJECTION INTRAMUSCULAR; INTRAVENOUS; SUBCUTANEOUS at 07:40

## 2021-07-08 RX ADMIN — FENTANYL CITRATE 25 MCG: 50 INJECTION, SOLUTION INTRAMUSCULAR; INTRAVENOUS at 08:25

## 2021-07-08 RX ADMIN — DEXMEDETOMIDINE HYDROCHLORIDE 4 MCG: 100 INJECTION, SOLUTION, CONCENTRATE INTRAVENOUS at 08:18

## 2021-07-08 RX ADMIN — PROPOFOL 20 MG: 10 INJECTION, EMULSION INTRAVENOUS at 07:40

## 2021-07-08 RX ADMIN — SODIUM CHLORIDE, POTASSIUM CHLORIDE, SODIUM LACTATE AND CALCIUM CHLORIDE 25 ML/HR: 600; 310; 30; 20 INJECTION, SOLUTION INTRAVENOUS at 06:51

## 2021-07-08 RX ADMIN — PROPOFOL INJECTABLE EMULSION 40 MCG/KG/MIN: 10 INJECTION, EMULSION INTRAVENOUS at 07:37

## 2021-07-08 RX ADMIN — Medication 10 MG: at 08:33

## 2021-07-08 RX ADMIN — ONDANSETRON HYDROCHLORIDE 4 MG: 2 INJECTION, SOLUTION INTRAMUSCULAR; INTRAVENOUS at 08:32

## 2021-07-08 RX ADMIN — DEXMEDETOMIDINE HYDROCHLORIDE 4 MCG: 100 INJECTION, SOLUTION, CONCENTRATE INTRAVENOUS at 08:07

## 2021-07-08 RX ADMIN — PROPOFOL 20 MG: 10 INJECTION, EMULSION INTRAVENOUS at 07:46

## 2021-07-08 RX ADMIN — FENTANYL CITRATE 25 MCG: 50 INJECTION, SOLUTION INTRAMUSCULAR; INTRAVENOUS at 08:18

## 2021-07-08 RX ADMIN — DEXMEDETOMIDINE HYDROCHLORIDE 4 MCG: 100 INJECTION, SOLUTION, CONCENTRATE INTRAVENOUS at 08:05

## 2021-07-08 RX ADMIN — PROPOFOL 20 MG: 10 INJECTION, EMULSION INTRAVENOUS at 08:06

## 2021-07-08 RX ADMIN — Medication 10 MG: at 07:48

## 2021-07-08 RX ADMIN — MIDAZOLAM HYDROCHLORIDE 2 MG: 1 INJECTION, SOLUTION INTRAMUSCULAR; INTRAVENOUS at 07:27

## 2021-07-08 NOTE — PERIOP NOTES
Pt. Alert. Denies pain or chill. Discharge instructions reviewed with caregiver and patient. Allowed and answered questions. Tolerating PO fluids. Both state ready for discharge. Stressed to both need to take Diclofenac with heavy food and doing exercises to prevent blood clots. 949 escorted to BR in cam boot and stressed needing level right shoe and careful with rock of boot. After few steps understood need. 950 Discharged to car without incident in com boot and ice pack and has other shoe.

## 2021-07-08 NOTE — ROUTINE PROCESS
Patient states that Ning Neumann will be with them for at least 24 hours following today's procedure. Permission received to review discharge instructions and discuss private health information with Ning Neumann. Mistral-Air warming blanket applied at this time. Set to appropriate setting that is comfortable to patient. Will continue to monitor.

## 2021-07-08 NOTE — ANESTHESIA PREPROCEDURE EVALUATION
Anesthetic History   No history of anesthetic complications            Review of Systems / Medical History  Patient summary reviewed, nursing notes reviewed and pertinent labs reviewed    Pulmonary        Sleep apnea: CPAP           Neuro/Psych   Within defined limits           Cardiovascular    Hypertension          Hyperlipidemia    Exercise tolerance: >4 METS  Comments: Hx Bradycardia     Negative stress test 10/15, EF 71%    07/21 EKG= SB, LAD, low volts, NSSTTW   GI/Hepatic/Renal     GERD ( asymptomatic)          Comments: Hx Teague's esophagus  Endo/Other             Other Findings   Comments: Accessory bone left foot    Chronic low back pain    Vertigo   Osteopenia   glaucoma          Physical Exam    Airway  Mallampati: I  TM Distance: 4 - 6 cm  Neck ROM: normal range of motion   Mouth opening: Normal     Cardiovascular  Regular rate and rhythm,  S1 and S2 normal,  no murmur, click, rub, or gallop  Rhythm: regular  Rate: normal      Pertinent negatives: No murmur   Dental    Dentition: Caps/crowns     Pulmonary  Breath sounds clear to auscultation               Abdominal  GI exam deferred       Other Findings            Anesthetic Plan    ASA: 2  Anesthesia type: MAC          Induction: Intravenous  Anesthetic plan and risks discussed with: Patient

## 2021-07-08 NOTE — DISCHARGE INSTRUCTIONS
TAKE NARCOTIC PAIN MEDICATIONS WITH FOOD     Narcotics tend to be constipating, we suggest taking a stool softener such as Colace or Miralax (follow package instructions). DO NOT DRIVE WHILE TAKING NARCOTIC PAIN MEDICATIONS. DO NOT TAKE SLEEPING MEDICATIONS OR ANTIANXIETY MEDICATIONS WHILE TAKING NARCOTIC PAIN MEDICATIONS,  ESPECIALLY THE NIGHT OF ANESTHESIA! CPAP PATIENTS BE SURE TO WEAR MACHINE WHENEVER NAPPING OR SLEEPING! DISCHARGE SUMMARY from Nurse    The following personal items collected during your admission are returned to you:   Dental Appliance: Dental Appliances: None  Vision: Visual Aid: Glasses, At home  Hearing Aid:    Jewelry: Jewelry: Ring  Clothing: Clothing: At bedside  Other Valuables: Other Valuables: None  Valuables sent to safe:        PATIENT INSTRUCTIONS:    After General Anesthesia or Intravenous Sedation, for 24 hours or while taking prescription Narcotics:        Someone should be with you for the next 24 hours. For your own safety, a responsible adult must drive you home. · Limit your activities  · Recommended activity: Rest today, up with assistance today. Do not climb stairs or shower unattended for the next 24 hours. · Please start with a soft bland diet and advance as tolerated (no nausea) to regular diet. · If you have a sore throat you should try the following: fluids, warm salt water gargles, or throat lozenges. If it does not improve after several days please follow up with your primary physician. · Do not drive and operate hazardous machinery  · Do not make important personal or business decisions  · Do  not drink alcoholic beverages  · If you have not urinated within 8 hours after discharge, please contact your surgeon on call.     Report the following to your surgeon:  · Excessive pain, swelling, redness or odor of or around the surgical area  · Temperature over 100.5  · Nausea and vomiting lasting longer than 4 hours or if unable to take medications  · Any signs of decreased circulation or nerve impairment to extremity: change in color, persistent  numbness, tingling, coldness or increase pain      · You will receive a Post Operative Call from one of the Recovery Room Nurses on the day after your surgery to check on you. It is very important for us to know how you are recovering after your surgery. If you have an issue or need to speak with someone, please call your surgeon, do not wait for the post operative call. · You may receive an e-mail or letter in the mail from [employer] regarding your experience with us in the Ambulatory Surgery Unit. Your feedback is valuable to us and we appreciate your participation in the survey. · If the above instructions are not adequate or you are having problems after your surgery, call the physician at their office number. · We wish you a speedy recovery ? What to do at Home:      *  Please give a list of your current medications to your Primary Care Provider. *  Please update this list whenever your medications are discontinued, doses are      changed, or new medications (including over-the-counter products) are added. *  Please carry medication information at all times in case of emergency situations. If you have not received your influenza and/or pneumococcal vaccine, please follow up with your primary care physician. The discharge information has been reviewed with the patient and caregiver. The patient and caregiver verbalized understanding. TO PREVENT AN INFECTION      1. [address] YOUR HANDS     To prevent infection, good handwashing is the most important thing you or your caregiver can do.  Wash your hands with soap and water or use the hand  we gave you before you touch any wounds. 2. SHOWER     Use the antibacterial soap we gave you when you take a shower.  Shower with this soap until your wounds are healed.        To reach all areas of your body, you may need someone to help you.  Dont forget to clean your belly button with every shower. 3.  USE CLEAN SHEETS     Use freshly cleaned sheets on your bed after surgery.  To keep the surgery site clean, do not allow pets to sleep with you while your wound is still healing. [place] THROMBOSIS AND PULMONARY EMBOLUS    SURGICAL PATIENTS  Surgical patients are the #1 risk for DVT and PE. WHAT IS DVT? WHAT IS PE?  DVT is a serious condition where blood clots develop deep in the veins of the legs. PE occurs when a blood clot breaks loose from the wall of a vein and travels to the lungs blocking the pulmonary artery or one of its branches impairing blood flow from the heart, which could result in death.   RISK FACTORS   Surgery lasting longer than 45 minutes   History of inflammatory bowel disease   Oral contraceptive or hormone replacement therapy   Immobilization   Varicose veins / swollen legs   Smoking    CHF / Acute MI / Irregular heart beat   Family history of thrombosis   General anesthesia greater than 2 hours   Obesity   Infection of less than one month   Less than 1 month postpartum   COPD / Pneumonia   Arthroscopic surgery   Malignancy / cancer   Spine surgery   Blood abnormalities   Stroke / Paralysis / Coma    SIGNS AND SYMPTOMS OF DEEP VEIN TROMBOSIS   -  ER VISIT  Usually occurs in one leg, above or below the knee   Swelling - one calf or thigh may be larger than the other   Feeling increased warmth in the area of the leg that is swollen or painful   Leg pain, which may increase when standing or walking   Swelling along the vein of the leg   When swollen areas is pressed with a finger, a depression may remain   Tenderness of the leg that may be confined to one area   Change in leg color (bluish or red)    SIGNS AND SYMPTOMS OF PULMONARY EMBOLUS  - 911 CALL   Chest pain that gets worse with deep breath, coughing or chest movement   Coughing up blood   Sweating   Shortness of breath or difficulty breathing   Rapid heart beat   Lightheadedness    HOW TO REDUCE THE POSSIBLE RISK OF DVT   Exercise - simple activities as rotating ankles and wrists, wiggling toes and fingers, tightening and relaxing muscles in calves and thighs promotes circulation while recovering from surgery. Please do these exercises every hour during waking hours      Take mediation as prescribed by your physician (Lovenox, Coumadin, Aspirin)   Resume your normal activities as soon as your doctor advises you to do so.  Remember, when traveling, to [place] your legs frequently.       PATIENTS WHO BELIEVE THEY MAY BE EXPERIENCING SIGNS AND SYMPTOMS OF DVT OR PE SHOULD SEEK MEDICAL HELP IMMEDIATELY

## 2021-07-08 NOTE — BRIEF OP NOTE
Brief Postoperative Note    Patient: Brenda Doyle  YOB: 1950  MRN: [id number]    Date of Procedure: 7/8/2021     Pre-Op Diagnosis: ACCESSORY BONE LEFT FOOT, PT TENDONITIS LEFT    Post-Op Diagnosis: Same as preoperative diagnosis. Procedure(s):  EXCISION OF ACCESSORY BONE LEFT FOOT (Kidner procedure) (MAC-IV SEDATION, ANKLE BLOCK BY SURGEON)    Surgeon(s):  Rodrigo Cleary DPM    Surgical Assistant: None    Anesthesia: MAC     Estimated Blood Loss (mL): Minimal    Complications: None    Specimens: * No specimens in log *     Implants: * No implants in log *    Drains: * No LDAs found *    Findings: Enlarged navicular tuberosity, accessory bone within PT tendon.     Electronically Signed by Clarita Dumont DPM on 7/8/2021 at 10:16 AM

## 2021-07-08 NOTE — ROUTINE PROCESS
Patient: Gab De Leon MRN: [id number]  SSN: [id number]   YOB: 1950  Age: 72 y.o. Sex: female     Patient is status post Procedure(s):  EXCISION OF ACCESSORY BONE LEFT FOOT (MAC-IV SEDATION, ANKLE BLOCK BY SURGEON). Surgeon(s) and Role:     * Aime Cleary, DPM - Primary    Local/Dose/Irrigation:  See [facility]                  Peripheral IV 07/08/21 Right Antecubital (Active)   Site Assessment Clean, dry, & intact 07/08/21 0650   Phlebitis Assessment 0 07/08/21 0650   Infiltration Assessment 0 07/08/21 0650   Dressing Status Clean, dry, & intact 07/08/21 0650   Dressing Type Tape;Transparent 07/08/21 0650   Hub Color/Line Status Pink; Infusing 07/08/21 0650                           Dressing/Packing:  Incision 07/08/21 Foot Left-Dressing/Treatment:  (Adaptic, 4 X 4, Amber Godoy) (07/08/21 0999)    Splint/Cast:  ]    Other:  N/A

## 2021-07-08 NOTE — ANESTHESIA POSTPROCEDURE EVALUATION
Procedure(s):  EXCISION OF ACCESSORY BONE LEFT FOOT (MAC-IV SEDATION, ANKLE BLOCK BY SURGEON). MAC    Anesthesia Post Evaluation      Multimodal analgesia: multimodal analgesia used between 6 hours prior to anesthesia start to PACU discharge  Patient location during evaluation: PACU  Patient participation: complete - patient participated  Level of consciousness: awake and alert  Pain score: 0  Airway patency: patent  Anesthetic complications: no  Cardiovascular status: acceptable  Respiratory status: acceptable  Hydration status: acceptable  Post anesthesia nausea and vomiting:  none  Final Post Anesthesia Temperature Assessment:  Normothermia (36.0-37.5 degrees C)      INITIAL Post-op Vital signs:   Vitals Value Taken Time   /63 07/08/21 0916   Temp 36.4 °C (97.5 °F) 07/08/21 0900   Pulse 49 07/08/21 0917   Resp 13 07/08/21 0917   SpO2 92 % 07/08/21 0917   Vitals shown include unvalidated device data.

## 2021-07-08 NOTE — PERIOP NOTES
Yessy Olga  1950  [id number]    Situation:  Verbal report given from: RN and CRNA  Procedure: Procedure(s):  EXCISION OF ACCESSORY BONE LEFT FOOT (MAC-IV SEDATION, ANKLE BLOCK BY SURGEON)    Background:    Preoperative diagnosis: ACCESSORY BONE LEFT FOOT, PT TENDONITIS LEFT    Postoperative diagnosis: ACCESSORY BONE LEFT FOOT, PT TENDONITIS LEFT    :  Dr. Raphael Desai    Assistant(s): Circ-1: Ember Agosto RN  Scrub Tech-1: Leticia Olivera    Specimens: * No specimens in log *    Assessment:  Intra-procedure medications         Anesthesia gave intra-procedure sedation and medications, see anesthesia flow sheet     Intravenous fluids: LR@ KVO     Vital signs stable. Pt's Sa02 up with deep breaths.  Left foot elevated and ice to ankle      Recommendation:    Permission to share finding with  : yes

## 2021-07-08 NOTE — OP NOTES
[address]  OPERATIVE REPORT    Name:  Deanne Chopra  MR#:  [id number]  :  1950  ACCOUNT #:  [de-identified]  DATE OF SERVICE:  2021    PREOPERATIVE DIAGNOSES:  1. Accessory navicular left foot. 2.  Posterior tibial tendonitis, left. POSTOPERATIVE DIAGNOSES:  1. Accessory navicular left foot. 2.  Posterior tibial tendonitis, left. PROCEDURE PERFORMED:  Modified Kidner, left foot. SURGEON:  Nydia Cleary DPM    ASSISTANT:  none    ANESTHESIA:  IV sedation with left ankle block. COMPLICATIONS:  None. SPECIMENS REMOVED:  None. IMPLANTS:  None. ESTIMATED BLOOD LOSS:  Minimal.    PROCEDURE IN DETAIL:  The patient was brought into the operating room and placed supine upon the OR table. After the administration of IV sedation, I injected the left ankle with 20 mL of 0.5% plain Marcaine. The foot was prepped and draped in the usual sterile technique. A time-out was observed. The left lower extremity was elevated for 3 minutes before the inflation of a tourniquet around the left ankle to a pressure of 250 mmHg. Attention was directed to the left navicular tuberosity. A linear incision was begun at the prominence of the left navicular tuberosity and extending approximately 2.5 to 3 inches proximally along the course of the posterior tibial tendon. The patient seemed to be experiencing some pain from the incision, so an additional 10 mL of 0.5% plain Marcaine was injected into the left foot to augment the ankle block. The incision was carried deeply through the subcutaneous tissue with a combination of sharp and blunt technique. Care was taken to identify and retract vital structures. Bleeding vessels were cauterized. The periosteum of the navicular bone was encountered along with the tendon sheath to the left posterior tibial tendon.   A #15 blade was used to make an incision through the periosteum and then continuing onto the posterior tibial tendon sheath. When the tendon sheath was opened, there was copious amount of thin but clear synovial fluid that was expressed from the tendon sheath. The tendon itself appeared to be slightly swollen but otherwise of normal appearance and consistency. There was an obvious hypertrophy of the navicular tuberosity. I can also palpate a small ossicle within the posterior tibial tendon just proximal to its insertion into the navicular tuberosity. A #15 scalpel was used to carefully dissect open the posterior tibial tendon and remove the accessory bone. The vast majority of the PT tendon was still intact after this had been performed and the accessory bone had been completely excised. A combination of a #15 blade and a Key elevator was used to elevate the periosteum from the navicular tuberosity and this was reduced with a combination of a power micro sagittal saw and an oval rotary bur.  Good reduction of the navicular tuberosity prominence was observed. The surgical site was flushed well with sterile irrigation. Closure was then performed utilizing 2-0 Vicryl for closure of the periosteum and the tendon sheath. The subcutaneous tissue was closed with 4-0 Vicryl and then the skin was closed with 4-0 nylon. The tourniquet was released and capillary refill was less than 3 seconds to all toes. The foot was cleaned and dried and then wrapped with dry sterile dressings incorporating Adaptic over the surgical incision. The patient was then transported to the recovery room with vital signs stable and vascular status intact. She will remain until deemed stable for discharge home. She has written and verbal postoperative instructions as well as appropriate postoperative pain medication and a Tall Cam boot to perform protected weightbearing left lower extremity. She will follow up in my office in 7-10 days.       CARMINE [email or web address]  D:  07/08/2021 10:24  T:  07/08/2021 13:13  JOB #: [id number]

## 2021-08-11 ENCOUNTER — HOSPITAL ENCOUNTER (OUTPATIENT)
Dept: VASCULAR SURGERY | Age: 71
Discharge: HOME | End: 2021-08-11
Attending: INTERNAL MEDICINE
Payer: MEDICARE

## 2021-08-11 DIAGNOSIS — M79.604 PAIN IN BOTH LOWER EXTREMITIES: ICD-10-CM

## 2021-08-11 DIAGNOSIS — M79.605 PAIN IN BOTH LOWER EXTREMITIES: ICD-10-CM

## 2021-08-11 PROCEDURE — 93922 UPR/L XTREMITY ART 2 LEVELS: CPT

## 2021-08-13 LAB
LEFT ABI: 1.09
LEFT ANTERIOR TIBIAL: 124 MMHG
LEFT ARM BP: 114 MMHG
LEFT ATA BP LEVEL: NORMAL
LEFT POSTERIOR TIBIAL: 120 MMHG
LEFT TBI: 0.79
LEFT TOE PRESSURE: 90 MMHG
RIGHT ABI: 1.06
RIGHT ANTERIOR TIBIAL: 120 MMHG
RIGHT ARM BP: 113 MMHG
RIGHT ATA BP LEVEL: NORMAL
RIGHT POSTERIOR TIBIAL: 121 MMHG
RIGHT TBI: 0.75
RIGHT TOE PRESSURE: 85 MMHG

## 2021-11-11 ENCOUNTER — HOSPITAL ENCOUNTER (OUTPATIENT)
Dept: MAMMOGRAPHY | Age: 71
Discharge: HOME | End: 2021-11-11
Attending: INTERNAL MEDICINE
Payer: MEDICARE

## 2021-11-11 ENCOUNTER — TRANSCRIBE ORDER (OUTPATIENT)
Dept: MAMMOGRAPHY | Age: 71
End: 2021-11-11

## 2021-11-11 DIAGNOSIS — Z12.31 VISIT FOR SCREENING MAMMOGRAM: Primary | ICD-10-CM

## 2021-11-11 DIAGNOSIS — Z12.31 VISIT FOR SCREENING MAMMOGRAM: ICD-10-CM

## 2021-11-11 PROCEDURE — 77067 SCR MAMMO BI INCL CAD: CPT

## 2021-11-17 NOTE — PROGRESS NOTES
BI-RADS 1. Negative. No mammographic evidence of malignancy.      Next screening mammogram is recommended in one year.  The patient will be  notified of these results

## 2022-01-09 DIAGNOSIS — E78.00 HIGH CHOLESTEROL: ICD-10-CM

## 2022-01-10 RX ORDER — ROSUVASTATIN CALCIUM 10 MG/1
TABLET, COATED ORAL
Qty: 90 TABLET | Refills: 1 | Status: SHIPPED | OUTPATIENT
Start: 2022-01-10 | End: 2022-02-01 | Stop reason: SDUPTHER

## 2022-02-01 ENCOUNTER — VIRTUAL VISIT (OUTPATIENT)
Dept: INTERNAL MEDICINE CLINIC | Age: 72
End: 2022-02-01
Payer: MEDICARE

## 2022-02-01 DIAGNOSIS — J45.20 MILD INTERMITTENT REACTIVE AIRWAY DISEASE WITHOUT COMPLICATION: ICD-10-CM

## 2022-02-01 DIAGNOSIS — J32.1 CHRONIC FRONTAL SINUSITIS: Primary | ICD-10-CM

## 2022-02-01 DIAGNOSIS — R73.03 PRE-DIABETES: ICD-10-CM

## 2022-02-01 DIAGNOSIS — G89.29 CHRONIC MIDLINE LOW BACK PAIN WITHOUT SCIATICA: ICD-10-CM

## 2022-02-01 DIAGNOSIS — M54.50 CHRONIC MIDLINE LOW BACK PAIN WITHOUT SCIATICA: ICD-10-CM

## 2022-02-01 DIAGNOSIS — G47.33 OSA (OBSTRUCTIVE SLEEP APNEA): ICD-10-CM

## 2022-02-01 DIAGNOSIS — I10 ESSENTIAL HYPERTENSION: ICD-10-CM

## 2022-02-01 DIAGNOSIS — Z00.00 MEDICARE ANNUAL WELLNESS VISIT, SUBSEQUENT: ICD-10-CM

## 2022-02-01 DIAGNOSIS — E78.00 HIGH CHOLESTEROL: ICD-10-CM

## 2022-02-01 PROBLEM — F11.99 OPIOID USE, UNSPECIFIED WITH UNSPECIFIED OPIOID-INDUCED DISORDER (HCC): Status: ACTIVE | Noted: 2022-02-01

## 2022-02-01 PROCEDURE — G8399 PT W/DXA RESULTS DOCUMENT: HCPCS | Performed by: INTERNAL MEDICINE

## 2022-02-01 PROCEDURE — G0439 PPPS, SUBSEQ VISIT: HCPCS | Performed by: INTERNAL MEDICINE

## 2022-02-01 PROCEDURE — G9899 SCRN MAM PERF RSLTS DOC: HCPCS | Performed by: INTERNAL MEDICINE

## 2022-02-01 PROCEDURE — 1101F PT FALLS ASSESS-DOCD LE1/YR: CPT | Performed by: INTERNAL MEDICINE

## 2022-02-01 PROCEDURE — 3017F COLORECTAL CA SCREEN DOC REV: CPT | Performed by: INTERNAL MEDICINE

## 2022-02-01 PROCEDURE — G8510 SCR DEP NEG, NO PLAN REQD: HCPCS | Performed by: INTERNAL MEDICINE

## 2022-02-01 PROCEDURE — G8427 DOCREV CUR MEDS BY ELIG CLIN: HCPCS | Performed by: INTERNAL MEDICINE

## 2022-02-01 PROCEDURE — G8536 NO DOC ELDER MAL SCRN: HCPCS | Performed by: INTERNAL MEDICINE

## 2022-02-01 PROCEDURE — G8419 CALC BMI OUT NRM PARAM NOF/U: HCPCS | Performed by: INTERNAL MEDICINE

## 2022-02-01 PROCEDURE — G8756 NO BP MEASURE DOC: HCPCS | Performed by: INTERNAL MEDICINE

## 2022-02-01 RX ORDER — AMOXICILLIN AND CLAVULANATE POTASSIUM 875; 125 MG/1; MG/1
1 TABLET, FILM COATED ORAL 2 TIMES DAILY
Qty: 14 TABLET | Refills: 0 | Status: SHIPPED | OUTPATIENT
Start: 2022-02-01 | End: 2022-08-25 | Stop reason: ALTCHOICE

## 2022-02-01 RX ORDER — FLUTICASONE PROPIONATE 50 MCG
2 SPRAY, SUSPENSION (ML) NASAL
Qty: 1 EACH | Refills: 2 | Status: SHIPPED | OUTPATIENT
Start: 2022-02-01

## 2022-02-01 RX ORDER — TRAMADOL HYDROCHLORIDE 50 MG/1
50 TABLET ORAL EVERY 12 HOURS
Qty: 30 TABLET | Refills: 0 | Status: SHIPPED | OUTPATIENT
Start: 2022-02-01 | End: 2022-03-03

## 2022-02-01 RX ORDER — ROSUVASTATIN CALCIUM 10 MG/1
10 TABLET, COATED ORAL
Qty: 90 TABLET | Refills: 1 | Status: SHIPPED | OUTPATIENT
Start: 2022-02-01 | End: 2022-08-25 | Stop reason: SDUPTHER

## 2022-02-01 RX ORDER — OLMESARTAN MEDOXOMIL AND HYDROCHLOROTHIAZIDE 40/12.5 40; 12.5 MG/1; MG/1
1 TABLET ORAL DAILY
Qty: 90 TABLET | Refills: 3 | Status: SHIPPED | OUTPATIENT
Start: 2022-02-01 | End: 2022-08-25 | Stop reason: SDUPTHER

## 2022-02-01 RX ORDER — CETIRIZINE HCL 10 MG
10 TABLET ORAL DAILY
Qty: 90 TABLET | Refills: 1 | Status: SHIPPED | OUTPATIENT
Start: 2022-02-01

## 2022-02-01 RX ORDER — ALBUTEROL SULFATE 90 UG/1
1 AEROSOL, METERED RESPIRATORY (INHALATION)
Qty: 1 EACH | Refills: 1 | Status: SHIPPED | OUTPATIENT
Start: 2022-02-01

## 2022-02-01 RX ORDER — TRAMADOL HYDROCHLORIDE 50 MG/1
TABLET ORAL
COMMUNITY
Start: 2021-03-24 | End: 2022-02-01 | Stop reason: SDUPTHER

## 2022-02-01 NOTE — PROGRESS NOTES
CC: Follow-up, Dizziness, and Annual Wellness Visit      HPI:    She is a 80 y.o. female who presents for evaluation of dizziness and headaches    Reports one month of dizziness if bends over  Reports pressure in sinuses and frontal and maxillary  If weather changes symptoms are worst        Started having pronounced back pain in late June 2018 -  Patient  thensaw Dr Townsend Settler and had procedure \" severing the nerve\" and had great success at the time and now having increased pain   Plans to go back to Dr Melly Blair been rarely taking tramadol -rarely for severe pain. Takes 50mg to 100mg if severe pain takes it specially after cleaning. Requesting refill   Improves quality of life  Discussed it can  Increase constipation and should avoid    Restless leg is bothersome - stopped gabapentin due to ankle swelling associated        HTN: BP checked at home and reports good  Control/   128/72  On olmesartan - HCTZ 40-12.5mg           Left ankle sx planned for July 8th \" remove extra bone that is causing pain\"        Insomnia: tried [place] and Katie Soto / Katie Soto initially worked at 10mg then stopped working - sleeps for a few hours and then wakes     Takes gabapentin when restless legs worsen PRN, daily caused swelling in legs       This is an established visit conducted via telemedicine with video. The patient has been instructed that this meets HIPAA criteria and acknowledges and agrees to this method of visitation. Pursuant to the emergency declaration under the [facility] waiver authority and the [employer] and [employer] Act, this Virtual Visit was conducted, with patient's consent, to reduce the patient's risk of exposure to COVID-19 and provide continuity of care for an established patient. Services were provided through a video synchronous discussion virtually to substitute for in-person clinic visit. ROS:  Constitutional: negative for fevers, chills, anorexia and weight loss  Eyes:   negative for visual disturbance,  irritation  ENT:   negative for tinnitus,sore throat,nasal congestion,ear pain, sinus pain. Respiratory:  negative for cough, hemoptysis, dyspnea,wheezing  CV:   negative for chest pain, palpitations, lower extremity edema  GI:   negative for nausea, vomiting, diarrhea, abdominal pain,melena  Genitourinary: negative for frequency, dysuria, hematuria  Musculoskel: negative for myalgias, arthralgias, back pain, muscle weakness, joint pain  Neurological:  negative for headaches, dizziness, focal weakness, numbness  Psych:             Negative for depression and anxiety    Past Medical History:   Diagnosis Date    Teague esophagus     small segment in 2014 Dr Cadence Humphries    Bradycardia     had negative stress test and nuclear imaging done in 2015    Chronic lower back pain     GERD (gastroesophageal reflux disease)     Glaucoma     H/O seasonal allergies     Heart murmur     High cholesterol     Hypertension     NICOLÁS on CPAP     Osteopenia     Vertigo        Current Outpatient Medications on File Prior to Visit   Medication Sig Dispense Refill    cpap machine kit by Does Not Apply route. Pressure setting is 11 mm Hg  Diagnosis is NICOLÁS 1 Kit 0    gabapentin (NEURONTIN) 300 mg capsule Take 1 Capsule by mouth nightly. (Patient taking differently: Take 300 mg by mouth nightly. Indications: pt only takes when legs are restless/painful) 90 Capsule 0    aspirin 81 mg chewable tablet Take 81 mg by mouth daily.  diclofenac EC (VOLTAREN) 75 mg EC tablet Take 1 Tablet by mouth two (2) times a day. Indications: as needed for pain 40 Tablet 0    omega-3 fatty acids-vitamin e (FISH OIL) 1,000 mg cap Take 2 Capsules by mouth daily. (Patient not taking: Reported on 2/1/2022)       No current facility-administered medications on file prior to visit.        Past Surgical History:   Procedure Laterality Date    HX APPENDECTOMY      with jose    HX BUNIONECTOMY Bilateral     HX CATARACT REMOVAL Bilateral 2017    with i-stents    HX COLONOSCOPY      HX HYSTERECTOMY      HX OPEN CHOLECYSTECTOMY      with appy    IR US GUIDE TISSUE ABLATE      lower back with Dr. Alban Hewitt       Family History   Problem Relation Age of Onset    Colon Cancer Mother 76    Coronary Art Dis Father 61    Breast Cancer Other         30's or 42's     Reviewed and no changes     Social History     Socioeconomic History    Marital status:      Spouse name: Not on file    Number of children: Not on file    Years of education: Not on file    Highest education level: Not on file   Occupational History    Not on file   Tobacco Use    Smoking status: Never Smoker    Smokeless tobacco: Never Used   Substance and Sexual Activity    Alcohol use: No    Drug use: No    Sexual activity: Not Currently   Other Topics Concern    Not on file   Social History Narrative    Not on file     Social Determinants of Health     Financial Resource Strain:     Difficulty of Paying Living Expenses: Not on file   Food Insecurity:     Worried About [address] in the Last Year: Not on file    [address] in the Last Year: Not on file   Transportation Needs:     Lack of Transportation (Medical): Not on file    Lack of Transportation (Non-Medical):  Not on file   Physical Activity:     Days of Exercise per Week: Not on file    Minutes of Exercise per Session: Not on file   Stress:     Feeling of Stress : Not on file   Social Connections:     Frequency of Communication with Friends and Family: Not on file    Frequency of Social Gatherings with Friends and Family: Not on file    Attends Religion Services: Not on file    Active Member of Clubs or Organizations: Not on file    Attends Club or Organization Meetings: Not on file    Marital Status: Not on file   Intimate Partner Violence:     Fear of Current or Ex-Partner: Not on file    Emotionally Abused: Not on file    Physically Abused: Not on file    Sexually Abused: Not on file   Housing Stability:     Unable to Pay for Housing in the Last Year: Not on file    Number of Places Lived in the Last Year: Not on file    Unstable Housing in the Last Year: Not on file          There were no vitals taken for this visit. Physical Examination:   Gen: well appearing female  HEENT: normal conjunctiva, no audible congestion, patient does not see oral erythema, has MMM  Neck: patient does not feel enlarged or tender LAD or masses  Resp: normal respiratory effort, no audible wheezing. CV: patient does not feel palpitations or heart irregularity  Abd: patient does not feel abdominal tenderness or mass, patient does not notice distension  Extrem: patient does not see swelling in ankles or joints.    Neuro: Alert and oriented, able to answer questions without difficulty, able to move all extremities and walk normally          Lab Results   Component Value Date/Time    WBC 5.4 06/18/2021 10:40 AM    HGB 13.0 06/18/2021 10:40 AM    HCT 40.7 06/18/2021 10:40 AM    PLATELET 365 00/71/2209 10:40 AM    MCV 94.4 06/18/2021 10:40 AM     Lab Results   Component Value Date/Time    Sodium 140 06/18/2021 10:40 AM    Potassium 4.6 06/18/2021 10:40 AM    Chloride 105 06/18/2021 10:40 AM    CO2 29 06/18/2021 10:40 AM    Anion gap 6 06/18/2021 10:40 AM    Glucose 92 06/18/2021 10:40 AM    BUN 18 06/18/2021 10:40 AM    Creatinine 0.53 (L) 06/18/2021 10:40 AM    BUN/Creatinine ratio 34 (H) 06/18/2021 10:40 AM    GFR est AA >60 06/18/2021 10:40 AM    GFR est non-AA >60 06/18/2021 10:40 AM    Calcium 9.8 06/18/2021 10:40 AM     Lab Results   Component Value Date/Time    Cholesterol, total 149 06/18/2021 10:40 AM    HDL Cholesterol 56 06/18/2021 10:40 AM    LDL, calculated 67.8 06/18/2021 10:40 AM    VLDL, calculated 25.2 06/18/2021 10:40 AM    Triglyceride 126 06/18/2021 10:40 AM    CHOL/HDL Ratio 2.7 06/18/2021 10:40 AM     Lab Results   Component Value Date/Time    TSH 1.34 06/18/2021 10:40 AM     No results found for: PSA, PSA2, PSAR1, Hmea Devoid, [id number], [id number]  Lab Results   Component Value Date/Time    Hemoglobin A1c 5.7 (H) 07/06/2020 11:21 AM    Hemoglobin A1c (POC) 6.0 07/01/2021 11:07 AM     Lab Results   Component Value Date/Time    Vitamin D 25-Hydroxy 65.8 06/18/2021 10:40 AM       Lab Results   Component Value Date/Time    ALT (SGPT) 18 06/18/2021 10:40 AM    Alk. phosphatase 63 06/18/2021 10:40 AM    Bilirubin, direct 0.11 03/23/2017 11:06 AM    Bilirubin, total 0.4 06/18/2021 10:40 AM           Assessment/Plan:    1. High cholesterol  LDL less than 70  - rosuvastatin (CRESTOR) 10 mg tablet; Take 1 Tablet by mouth nightly. Indications: stroke prevention  Dispense: 90 Tablet; Refill: 1  - METABOLIC PANEL, COMPREHENSIVE; Future    2. Essential hypertension  Blood pressure is well controlled  - olmesartan-hydroCHLOROthiazide (BENICAR HCT) 40-12.5 mg per tablet; Take 1 Tablet by mouth daily. Dispense: 90 Tablet; Refill: 3  - METABOLIC PANEL, COMPREHENSIVE; Future    3. Mild intermittent reactive airway disease without complication  - albuterol (PROVENTIL HFA, VENTOLIN HFA, PROAIR HFA) 90 mcg/actuation inhaler; Take 1 Puff by inhalation every four (4) hours as needed for Wheezing. Dispense: 1 Each; Refill: 1    4. Chronic frontal sinusitis symptoms for one month will treat with antibiotic  - cetirizine (ZyrTEC) 10 mg tablet; Take 1 Tablet by mouth daily. Dispense: 90 Tablet; Refill: 1  - fluticasone propionate (Flonase) 50 mcg/actuation nasal spray; 2 Sprays by Both Nostrils route daily as needed for Allergies. Dispense: 1 Each; Refill: 2  - amoxicillin-clavulanate (AUGMENTIN) 875-125 mg per tablet; Take 1 Tablet by mouth two (2) times a day. Indications: acute bacterial infection of the sinuses  Dispense: 14 Tablet; Refill: 0    5.  Pre-diabetes  - diet controlled  - HEMOGLOBIN A1C WITH EAG; Future  - METABOLIC PANEL, COMPREHENSIVE; Future    6. Chronic midline low back pain without sciatica  Occasionally has severe pain and taking tramadol   - traMADoL (ULTRAM) 50 mg tablet; Take 1 Tablet by mouth every twelve (12) hours for 30 days. Max Daily Amount: 100 mg. Per patient, she takes 75mg po prn every day  Dispense: 30 Tablet; Refill: 0    7. Medicare annual wellness visit, subsequent              Brooks Rciks MD    This is an established visit conducted via real time video and audio telemedicine. The patient has been instructed that this meets HIPAA criteria and acknowledges and agrees to this method of visitation. This is the Subsequent Medicare Annual Wellness Exam, performed 12 months or more after the Initial AWV or the last Subsequent AWV    I have reviewed the patient's medical history in detail and updated the computerized patient record. Assessment/Plan   Education and counseling provided:  Are appropriate based on today's review and evaluation    1. Chronic frontal sinusitis  -     cetirizine (ZyrTEC) 10 mg tablet; Take 1 Tablet by mouth daily. , Normal, Disp-90 Tablet, R-1  -     fluticasone propionate (Flonase) 50 mcg/actuation nasal spray; 2 Sprays by Both Nostrils route daily as needed for Allergies. , Normal, Disp-1 Each, R-2  -     amoxicillin-clavulanate (AUGMENTIN) 875-125 mg per tablet; Take 1 Tablet by mouth two (2) times a day. Indications: acute bacterial infection of the sinuses, Normal, Disp-14 Tablet, R-0  2. High cholesterol  -     rosuvastatin (CRESTOR) 10 mg tablet; Take 1 Tablet by mouth nightly. Indications: stroke prevention, Normal, Disp-90 Tablet, R-1  -     METABOLIC PANEL, COMPREHENSIVE; Future  3. Essential hypertension  -     olmesartan-hydroCHLOROthiazide (BENICAR HCT) 40-12.5 mg per tablet; Take 1 Tablet by mouth daily. , Normal, Disp-90 Tablet, R-3DX Code Needed  . -     METABOLIC PANEL, COMPREHENSIVE; Future  4.  Mild intermittent reactive airway disease without complication  -     albuterol (PROVENTIL HFA, VENTOLIN HFA, PROAIR HFA) 90 mcg/actuation inhaler; Take 1 Puff by inhalation every four (4) hours as needed for Wheezing., Normal, Disp-1 Each, R-1  5. Pre-diabetes  -     HEMOGLOBIN A1C WITH EAG; Future  -     METABOLIC PANEL, COMPREHENSIVE; Future  6. Chronic midline low back pain without sciatica  -     traMADoL (ULTRAM) 50 mg tablet; Take 1 Tablet by mouth every twelve (12) hours for 30 days. Max Daily Amount: 100 mg. Per patient, she takes 75mg po prn every day, Normal, Disp-30 Tablet, R-0  7. Medicare annual wellness visit, subsequent       Depression Risk Factor Screening:     3 most recent PHQ Screens 2/1/2022   Little interest or pleasure in doing things Not at all   Feeling down, depressed, irritable, or hopeless Not at all   Total Score PHQ 2 0       Alcohol & Drug Abuse Risk Screen    Do you average more than 1 drink per night or more than 7 drinks a week:  No    On any one occasion in the past three months have you have had more than 3 drinks containing alcohol:  No          Functional Ability and Level of Safety:    Hearing: Hearing is good. Activities of Daily Living: The home contains: no safety equipment. Patient does total self care      Ambulation: with no difficulty     Fall Risk:  Fall Risk Assessment, last 12 mths 2/1/2022   Able to walk? Yes   Fall in past 12 months? 0   Do you feel unsteady?  1   Are you worried about falling 0   Is the gait abnormal? -   Number of falls in past 12 months -      Abuse Screen:  Patient is not abused       Cognitive Screening    Has your family/caregiver stated any concerns about your memory: no    Cognitive Screening: Normal - Verbal Fluency Test    Health Maintenance Due     Health Maintenance Due   Topic Date Due    A1C test (Diabetic or Prediabetic)  07/06/2021       Patient Care Team   Patient Care Team:  Yaniv Lund MD as PCP - General (Internal Medicine)  Marylen Reap, MD as PCP - [address] [place] [address] Dr Rivers Provider  Jostin Louise MD as Consulting Provider (Gastroenterology)  Sushila Gonzales MD (Pain Management)  Sushila Gonzales MD (Pain Management)    History     Patient Active Problem List   Diagnosis Code    Chest pain R07.9    Hypertension I10    High cholesterol E78.00    Osteopenia M85.80    Sleep apnea G47.30    Primary open angle glaucoma of right eye, mild stage H40.1111    Combined forms of age-related cataract of right eye H25.811    Primary open angle glaucoma of left eye, mild stage H40.1121    Combined forms of age-related cataract of left eye H25.812    Right posterior capsular opacification H26.491    Left posterior capsular opacification H26.492    Opioid use, unspecified with unspecified opioid-induced disorder F11.99     Past Medical History:   Diagnosis Date    Teague esophagus     small segment in 2014 Dr Pablo Duke    Bradycardia     had negative stress test and nuclear imaging done in 2015    Chronic lower back pain     GERD (gastroesophageal reflux disease)     Glaucoma     H/O seasonal allergies     Heart murmur     High cholesterol     Hypertension     NICOLÁS on CPAP     Osteopenia     Vertigo       Past Surgical History:   Procedure Laterality Date    HX APPENDECTOMY      with jose    HX BUNIONECTOMY Bilateral     HX CATARACT REMOVAL Bilateral 2017    with i-stents    HX COLONOSCOPY      HX HYSTERECTOMY      HX OPEN CHOLECYSTECTOMY      with appy    IR US GUIDE TISSUE ABLATE      lower back with Dr. Francisco Puentes     Current Outpatient Medications   Medication Sig Dispense Refill    traMADoL (ULTRAM) 50 mg tablet Take 1 Tablet by mouth every twelve (12) hours for 30 days. Max Daily Amount: 100 mg. Per patient, she takes 75mg po prn every day 30 Tablet 0    rosuvastatin (CRESTOR) 10 mg tablet Take 1 Tablet by mouth nightly.  Indications: stroke prevention 90 Tablet 1    cetirizine (ZyrTEC) 10 mg tablet Take 1 Tablet by mouth daily. 90 Tablet 1    olmesartan-hydroCHLOROthiazide (BENICAR HCT) 40-12.5 mg per tablet Take 1 Tablet by mouth daily. 90 Tablet 3    albuterol (PROVENTIL HFA, VENTOLIN HFA, PROAIR HFA) 90 mcg/actuation inhaler Take 1 Puff by inhalation every four (4) hours as needed for Wheezing. 1 Each 1    fluticasone propionate (Flonase) 50 mcg/actuation nasal spray 2 Sprays by Both Nostrils route daily as needed for Allergies. 1 Each 2    amoxicillin-clavulanate (AUGMENTIN) 875-125 mg per tablet Take 1 Tablet by mouth two (2) times a day. Indications: acute bacterial infection of the sinuses 14 Tablet 0    cpap machine kit by Does Not Apply route. Pressure setting is 11 mm Hg  Diagnosis is NICOLÁS 1 Kit 0    gabapentin (NEURONTIN) 300 mg capsule Take 1 Capsule by mouth nightly. (Patient taking differently: Take 300 mg by mouth nightly. Indications: pt only takes when legs are restless/painful) 90 Capsule 0    aspirin 81 mg chewable tablet Take 81 mg by mouth daily.  diclofenac EC (VOLTAREN) 75 mg EC tablet Take 1 Tablet by mouth two (2) times a day. Indications: as needed for pain 40 Tablet 0    omega-3 fatty acids-vitamin e (FISH OIL) 1,000 mg cap Take 2 Capsules by mouth daily. (Patient not taking: Reported on 2/1/2022)       Allergies   Allergen Reactions    Latex Rash    Codeine Other (comments)     Chest pain per pt    Thiuram Analogues Rash       Family History   Problem Relation Age of Onset    Colon Cancer Mother 76    Coronary Art Dis Father 61    Breast Cancer Other         29's or 42's     Social History     Tobacco Use    Smoking status: Never Smoker    Smokeless tobacco: Never Used   Substance Use Topics    Alcohol use: Heydi Wheeler, was evaluated through a synchronous (real-time) audio-video encounter. The patient (or guardian if applicable) is aware that this is a billable service, which includes applicable co-pays.  Verbal consent to proceed has been obtained. The visit was conducted pursuant to the emergency declaration under the [facility], [address] [place] [address] authority and the [employer] and [employer] Act. Patient identification was verified, and a caregiver was present when appropriate. The patient was located at home in a state where the provider was licensed to provide care.        ADDENDUM: patient has NICOLÁS and has been compliant with CPAP use and benefiting from therapy     Katelin Villareal MD

## 2022-02-01 NOTE — PATIENT INSTRUCTIONS

## 2022-02-01 NOTE — PROGRESS NOTES
Elieser Jane (: 1950) is a 80 y.o. female, established patient, here for evaluation of the following chief complaint(s):   Follow-up, Dizziness, and Annual Wellness Visit       ASSESSMENT/PLAN:  Below is the assessment and plan developed based on review of pertinent history, labs, studies, and medications. 1. High cholesterol  The following orders have not been finalized:  -     rosuvastatin (CRESTOR) 10 mg tablet  2. Essential hypertension  The following orders have not been finalized:  -     olmesartan-hydroCHLOROthiazide (BENICAR HCT) 40-12.5 mg per tablet  3. Mild intermittent reactive airway disease without complication  The following orders have not been finalized:  -     albuterol (PROVENTIL HFA, VENTOLIN HFA, PROAIR HFA) 90 mcg/actuation inhaler      No follow-ups on file. SUBJECTIVE/OBJECTIVE:  HPI    Review of Systems     Patient-Reported Systolic (Top): 573  Patient-Reported Diastolic (Bottom): 82  Patient-Reported Pulse: 66  Patient-Reported Temperature: 97.8         Elieser Jane, was evaluated through a synchronous (real-time) audio-video encounter. The patient (or guardian if applicable) is aware that this is a billable service, which includes applicable co-pays. Verbal consent to proceed has been obtained. The visit was conducted pursuant to the emergency declaration under the [address] [place] [address], [address] [place] [address] [facility] authority and the [employer] and [employer] General Act. Patient identification was verified, and a caregiver was present when appropriate. The patient was located at home in a state where the provider was licensed to provide care. An electronic signature was used to authenticate this note.   -- Esther Lund LPN

## 2022-02-04 ENCOUNTER — LAB ONLY (OUTPATIENT)
Dept: INTERNAL MEDICINE CLINIC | Age: 72
End: 2022-02-04

## 2022-02-04 DIAGNOSIS — E78.00 HIGH CHOLESTEROL: ICD-10-CM

## 2022-02-04 DIAGNOSIS — I10 ESSENTIAL HYPERTENSION: ICD-10-CM

## 2022-02-04 DIAGNOSIS — R73.03 PRE-DIABETES: ICD-10-CM

## 2022-02-05 LAB
ALBUMIN SERPL-MCNC: 3.9 G/DL (ref 3.5–5)
ALBUMIN/GLOB SERPL: 1.2 {RATIO} (ref 1.1–2.2)
ALP SERPL-CCNC: 65 U/L (ref 45–117)
ALT SERPL-CCNC: 30 U/L (ref 12–78)
ANION GAP SERPL CALC-SCNC: 1 MMOL/L (ref 5–15)
AST SERPL-CCNC: 28 U/L (ref 15–37)
BILIRUB SERPL-MCNC: 0.5 MG/DL (ref 0.2–1)
BUN SERPL-MCNC: 23 MG/DL (ref 6–20)
BUN/CREAT SERPL: 37 (ref 12–20)
CALCIUM SERPL-MCNC: 9.5 MG/DL (ref 8.5–10.1)
CHLORIDE SERPL-SCNC: 107 MMOL/L (ref 97–108)
CO2 SERPL-SCNC: 30 MMOL/L (ref 21–32)
CREAT SERPL-MCNC: 0.63 MG/DL (ref 0.55–1.02)
EST. AVERAGE GLUCOSE BLD GHB EST-MCNC: 126 MG/DL
GLOBULIN SER CALC-MCNC: 3.2 G/DL (ref 2–4)
GLUCOSE SERPL-MCNC: 97 MG/DL (ref 65–100)
HBA1C MFR BLD: 6 % (ref 4–5.6)
POTASSIUM SERPL-SCNC: 4.1 MMOL/L (ref 3.5–5.1)
PROT SERPL-MCNC: 7.1 G/DL (ref 6.4–8.2)
SODIUM SERPL-SCNC: 138 MMOL/L (ref 136–145)

## 2022-03-18 PROBLEM — H40.1111 PRIMARY OPEN ANGLE GLAUCOMA OF RIGHT EYE, MILD STAGE: Status: ACTIVE | Noted: 2017-05-23

## 2022-03-18 PROBLEM — H25.811 COMBINED FORMS OF AGE-RELATED CATARACT OF RIGHT EYE: Status: ACTIVE | Noted: 2017-05-23

## 2022-03-19 PROBLEM — H26.491 RIGHT POSTERIOR CAPSULAR OPACIFICATION: Status: ACTIVE | Noted: 2017-10-30

## 2022-03-19 PROBLEM — H25.812 COMBINED FORMS OF AGE-RELATED CATARACT OF LEFT EYE: Status: ACTIVE | Noted: 2017-06-02

## 2022-03-19 PROBLEM — H26.492 LEFT POSTERIOR CAPSULAR OPACIFICATION: Status: ACTIVE | Noted: 2018-04-24

## 2022-03-19 PROBLEM — F11.99 OPIOID USE, UNSPECIFIED WITH UNSPECIFIED OPIOID-INDUCED DISORDER (HCC): Status: ACTIVE | Noted: 2022-02-01

## 2022-03-20 PROBLEM — H40.1121 PRIMARY OPEN ANGLE GLAUCOMA OF LEFT EYE, MILD STAGE: Status: ACTIVE | Noted: 2017-06-02

## 2022-07-01 PROBLEM — F11.99 OPIOID USE, UNSPECIFIED WITH UNSPECIFIED OPIOID-INDUCED DISORDER (HCC): Status: RESOLVED | Noted: 2022-02-01 | Resolved: 2022-07-01

## 2022-08-25 ENCOUNTER — OFFICE VISIT (OUTPATIENT)
Dept: INTERNAL MEDICINE CLINIC | Age: 72
End: 2022-08-25
Payer: MEDICARE

## 2022-08-25 VITALS
OXYGEN SATURATION: 97 % | HEART RATE: 54 BPM | TEMPERATURE: 96.8 F | RESPIRATION RATE: 16 BRPM | HEIGHT: 62 IN | SYSTOLIC BLOOD PRESSURE: 126 MMHG | WEIGHT: 170 LBS | DIASTOLIC BLOOD PRESSURE: 76 MMHG | BODY MASS INDEX: 31.28 KG/M2

## 2022-08-25 DIAGNOSIS — J45.20 MILD INTERMITTENT ASTHMA WITHOUT COMPLICATION: ICD-10-CM

## 2022-08-25 DIAGNOSIS — G25.81 RESTLESS LEG: ICD-10-CM

## 2022-08-25 DIAGNOSIS — M54.50 CHRONIC MIDLINE LOW BACK PAIN WITHOUT SCIATICA: ICD-10-CM

## 2022-08-25 DIAGNOSIS — E78.00 HIGH CHOLESTEROL: ICD-10-CM

## 2022-08-25 DIAGNOSIS — G89.29 CHRONIC MIDLINE LOW BACK PAIN WITHOUT SCIATICA: ICD-10-CM

## 2022-08-25 DIAGNOSIS — F51.01 PRIMARY INSOMNIA: ICD-10-CM

## 2022-08-25 DIAGNOSIS — I10 ESSENTIAL HYPERTENSION: ICD-10-CM

## 2022-08-25 DIAGNOSIS — G47.33 OSA (OBSTRUCTIVE SLEEP APNEA): Primary | ICD-10-CM

## 2022-08-25 DIAGNOSIS — F11.99 OPIOID USE, UNSPECIFIED WITH UNSPECIFIED OPIOID-INDUCED DISORDER (HCC): ICD-10-CM

## 2022-08-25 PROCEDURE — G8752 SYS BP LESS 140: HCPCS | Performed by: INTERNAL MEDICINE

## 2022-08-25 PROCEDURE — G8399 PT W/DXA RESULTS DOCUMENT: HCPCS | Performed by: INTERNAL MEDICINE

## 2022-08-25 PROCEDURE — 3017F COLORECTAL CA SCREEN DOC REV: CPT | Performed by: INTERNAL MEDICINE

## 2022-08-25 PROCEDURE — G9899 SCRN MAM PERF RSLTS DOC: HCPCS | Performed by: INTERNAL MEDICINE

## 2022-08-25 PROCEDURE — G8432 DEP SCR NOT DOC, RNG: HCPCS | Performed by: INTERNAL MEDICINE

## 2022-08-25 PROCEDURE — 99214 OFFICE O/P EST MOD 30 MIN: CPT | Performed by: INTERNAL MEDICINE

## 2022-08-25 PROCEDURE — 1090F PRES/ABSN URINE INCON ASSESS: CPT | Performed by: INTERNAL MEDICINE

## 2022-08-25 PROCEDURE — 1101F PT FALLS ASSESS-DOCD LE1/YR: CPT | Performed by: INTERNAL MEDICINE

## 2022-08-25 PROCEDURE — G8754 DIAS BP LESS 90: HCPCS | Performed by: INTERNAL MEDICINE

## 2022-08-25 PROCEDURE — G8427 DOCREV CUR MEDS BY ELIG CLIN: HCPCS | Performed by: INTERNAL MEDICINE

## 2022-08-25 PROCEDURE — G8536 NO DOC ELDER MAL SCRN: HCPCS | Performed by: INTERNAL MEDICINE

## 2022-08-25 PROCEDURE — G8417 CALC BMI ABV UP PARAM F/U: HCPCS | Performed by: INTERNAL MEDICINE

## 2022-08-25 RX ORDER — TRAMADOL HYDROCHLORIDE 50 MG/1
50 TABLET ORAL
Qty: 30 TABLET | Refills: 0 | Status: SHIPPED | OUTPATIENT
Start: 2022-08-25 | End: 2022-09-24

## 2022-08-25 RX ORDER — OLMESARTAN MEDOXOMIL AND HYDROCHLOROTHIAZIDE 40/12.5 40; 12.5 MG/1; MG/1
1 TABLET ORAL DAILY
Qty: 90 TABLET | Refills: 3 | Status: SHIPPED | OUTPATIENT
Start: 2022-08-25

## 2022-08-25 RX ORDER — TRAMADOL HYDROCHLORIDE 50 MG/1
50 TABLET ORAL
COMMUNITY
End: 2022-08-25 | Stop reason: SDUPTHER

## 2022-08-25 RX ORDER — ROSUVASTATIN CALCIUM 10 MG/1
10 TABLET, COATED ORAL
Qty: 90 TABLET | Refills: 1 | Status: SHIPPED | OUTPATIENT
Start: 2022-08-25

## 2022-08-25 RX ORDER — GABAPENTIN 300 MG/1
300 CAPSULE ORAL
Qty: 90 CAPSULE | Refills: 1 | Status: SHIPPED | OUTPATIENT
Start: 2022-08-25

## 2022-08-25 NOTE — PROGRESS NOTES
1. \"Have you been to the ER, urgent care clinic since your last visit? Hospitalized since your last visit? \" No    2. \"Have you seen or consulted any other health care providers outside of the [address] [place] [address] since your last visit? \" Yes       3. For patients aged 39-70: Has the patient had a colonoscopy / FIT/ Cologuard? Yes - no Care Gap present      If the patient is female:    4. For patients aged 41-77: Has the patient had a mammogram within the past 2 years? Yes - no Care Gap present      5. For patients aged 21-65: Has the patient had a pap smear?  NA - based on age or sex

## 2022-08-25 NOTE — PROGRESS NOTES
CC: Physical, Medication Evaluation, and Medication Refill      HPI:    She is a 71 y.o. female who presents for follow up on chronic medical issues     Started having pronounced back pain in late June 2018 -  Patient  thensaw Dr Brenda Mccallum and had procedure \" severing the nerve\" and had great success at the time and doing ok     Parish [facility]ds been rarely taking tramadol -rarely for severe pain. Takes 50mg to 100mg if severe pain takes it specially after cleaning. Requesting refill   Improves quality of life  Rarely using it     Restless leg is bothersome -gabapentin as needed        HTN: BP checked at home and reports good  Control/   128/72  On olmesartan - HCTZ 40-12.5mg           Left ankle sx done  for July 8th and improving        Insomnia: tried [place] and Nhung Flake / [place]hung Flake initially worked at 10mg then stopped working - sleeps for a few hours and then wakes     Takes gabapentin when restless legs worsen PRN, daily caused swelling in legs     Declines shingles shot        ROS:  Constitutional: negative for fevers, chills, anorexia and weight loss  10 systems reviewed and negative other then HPI     Past Medical History:   Diagnosis Date    Teague esophagus     small segment in 2014 Dr Jesus Musa    Bradycardia     had negative stress test and nuclear imaging done in 2015    Chronic lower back pain     GERD (gastroesophageal reflux disease)     Glaucoma     H/O seasonal allergies     Heart murmur     High cholesterol     Hypertension     NICOLÁS on CPAP     Osteopenia     Vertigo        Current Outpatient Medications on File Prior to Visit   Medication Sig Dispense Refill    traMADoL (ULTRAM) 50 mg tablet Take 50 mg by mouth every six (6) hours as needed for Pain. rosuvastatin (CRESTOR) 10 mg tablet Take 1 Tablet by mouth nightly. Indications: stroke prevention 90 Tablet 1    cetirizine (ZyrTEC) 10 mg tablet Take 1 Tablet by mouth daily.  90 Tablet 1    olmesartan-hydroCHLOROthiazide (BENICAR HCT) 40-12.5 mg per tablet Take 1 Tablet by mouth daily. 90 Tablet 3    albuterol (PROVENTIL HFA, VENTOLIN HFA, PROAIR HFA) 90 mcg/actuation inhaler Take 1 Puff by inhalation every four (4) hours as needed for Wheezing. 1 Each 1    fluticasone propionate (Flonase) 50 mcg/actuation nasal spray 2 Sprays by Both Nostrils route daily as needed for Allergies. 1 Each 2    cpap machine kit by Does Not Apply route. Pressure setting is 11 mm Hg  Diagnosis is NICOLÁS 1 Kit 0    gabapentin (NEURONTIN) 300 mg capsule Take 1 Capsule by mouth nightly. (Patient taking differently: Take 300 mg by mouth nightly. Indications: pt only takes when legs are restless/painful) 90 Capsule 0    aspirin 81 mg chewable tablet Take 81 mg by mouth daily. amoxicillin-clavulanate (AUGMENTIN) 875-125 mg per tablet Take 1 Tablet by mouth two (2) times a day. Indications: acute bacterial infection of the sinuses 14 Tablet 0    diclofenac EC (VOLTAREN) 75 mg EC tablet Take 1 Tablet by mouth two (2) times a day. Indications: as needed for pain 40 Tablet 0    omega-3 fatty acids-vitamin e 1,000 mg cap Take 2 Capsules by mouth daily. (Patient not taking: No sig reported)       No current facility-administered medications on file prior to visit.        Past Surgical History:   Procedure Laterality Date    HX APPENDECTOMY      with jose    HX BUNIONECTOMY Bilateral     HX CATARACT REMOVAL Bilateral 2017    with i-stents    HX COLONOSCOPY      HX HYSTERECTOMY      HX OPEN CHOLECYSTECTOMY      with appy    IR US GUIDE TISSUE ABLATE      lower back with Dr. Harriet House       Family History   Problem Relation Age of Onset    Colon Cancer Mother 76    Coronary Art Dis Father 61    Breast Cancer Other         29's or 42's     Reviewed and no changes     Social History     Socioeconomic History    Marital status:      Spouse name: Not on file    Number of children: Not on file    Years of education: Not on file    Highest education level: Not on file   Occupational History    Not on file   Tobacco Use    Smoking status: Never    Smokeless tobacco: Never   Vaping Use    Vaping Use: Never used   Substance and Sexual Activity    Alcohol use: No    Drug use: No    Sexual activity: Not Currently   Other Topics Concern    Not on file   Social History Narrative    Not on file     Social Determinants of Health     Financial Resource Strain: Low Risk     Difficulty of Paying Living Expenses: Not hard at all   Food Insecurity: No Food Insecurity    Worried About Running Out of Food in the Last Year: Never true    Ran Out of Food in the Last Year: Never true   Transportation Needs: Not on file   Physical Activity: Not on file   Stress: Not on file   Social Connections: Not on file   Intimate Partner Violence: Not on file   Housing Stability: Not on file          Visit Vitals  /76   Pulse (!) 54   Temp 96.8 °F (36 °C) (Temporal)   Resp 16   Ht 5' 2\" (1.575 m)   Wt 170 lb (77.1 kg)   SpO2 97%   BMI 31.09 kg/m²       Physical Examination:   Gen: well appearing female  HEENT: normal conjunctiva, PEERLA  Neck: supple, no carotid bruits   Resp: CTA B/L    CV: RRR normal S1S2 no g/r/m  Abd: +BS soft NT/ND  Extrem: no swelling  Neuro: Alert and oriented, non focal          Lab Results   Component Value Date/Time    WBC 5.4 06/18/2021 10:40 AM    HGB 13.0 06/18/2021 10:40 AM    HCT 40.7 06/18/2021 10:40 AM    PLATELET 088 22/04/3050 10:40 AM    MCV 94.4 06/18/2021 10:40 AM     Lab Results   Component Value Date/Time    Sodium 138 02/04/2022 09:46 AM    Potassium 4.1 02/04/2022 09:46 AM    Chloride 107 02/04/2022 09:46 AM    CO2 30 02/04/2022 09:46 AM    Anion gap 1 (L) 02/04/2022 09:46 AM    Glucose 97 02/04/2022 09:46 AM    BUN 23 (H) 02/04/2022 09:46 AM    Creatinine 0.63 02/04/2022 09:46 AM    BUN/Creatinine ratio 37 (H) 02/04/2022 09:46 AM    GFR est AA >60 02/04/2022 09:46 AM    GFR est non-AA >60 02/04/2022 09:46 AM    Calcium 9.5 02/04/2022 09:46 AM     Lab Results   Component Value Date/Time Cholesterol, total 149 06/18/2021 10:40 AM    HDL Cholesterol 56 06/18/2021 10:40 AM    LDL, calculated 67.8 06/18/2021 10:40 AM    VLDL, calculated 25.2 06/18/2021 10:40 AM    Triglyceride 126 06/18/2021 10:40 AM    CHOL/HDL Ratio 2.7 06/18/2021 10:40 AM     Lab Results   Component Value Date/Time    TSH 1.34 06/18/2021 10:40 AM     No results found for: PSA, PSA2, PSAR1, Leigh Campbell, [id number], [id number]  Lab Results   Component Value Date/Time    Hemoglobin A1c 6.0 (H) 02/04/2022 09:46 AM    Hemoglobin A1c (POC) 6.0 07/01/2021 11:07 AM     Lab Results   Component Value Date/Time    Vitamin D 25-Hydroxy 65.8 06/18/2021 10:40 AM       Lab Results   Component Value Date/Time    ALT (SGPT) 30 02/04/2022 09:46 AM    Alk. phosphatase 65 02/04/2022 09:46 AM    Bilirubin, direct 0.11 03/23/2017 11:06 AM    Bilirubin, total 0.5 02/04/2022 09:46 AM           Assessment/Plan:    1. High cholesterol  LDL less than 70  Continue crestor     2. Essential hypertension  Blood pressure is well controlled  - olmesartan-hydroCHLOROthiazide (BENICAR HCT) 40-12.5 mg per tablet; Take 1 Tablet by mouth daily. Dispense: 90 Tablet; Refill: 3  - METABOLIC PANEL, COMPREHENSIVE; Future    3. Mild intermittent reactive airway disease without complication referrla to pulm  - albuterol (PROVENTIL HFA, VENTOLIN HFA, PROAIR HFA) 90 mcg/actuation inhaler; Take 1 Puff by inhalation every four (4) hours as needed for Wheezing. Dispense: 1 Each; Refill: 1    4. NICOLÁS: using CPAP    5. Pre-diabetes  - diet controlled    6. Chronic midline low back pain without sciatica  Occasionally has severe pain and taking tramadol   - traMADoL (ULTRAM) 50 mg tablet; Take 1 Tablet by mouth every twelve (12) hours for 30 days. Max Daily Amount: 100 mg. Per patient, she takes 75mg po prn every day  Dispense: 30 Tablet;  Refill: 0    Orders Placed This Encounter    LIPID PANEL     Standing Status:   Future     Standing Expiration Date:   8/25/2023 METABOLIC PANEL, COMPREHENSIVE     Standing Status:   Future     Standing Expiration Date:   8/25/2023    CBC WITH AUTOMATED DIFF     Standing Status:   Future     Standing Expiration Date:   8/25/2023    [facility]     Referral Priority:   Routine     Referral Type:   Consultation     Referral Reason:   Specialty Services Required     Referred to Provider:   Laverna Meckel, MD     Number of Visits Requested:   1    DISCONTD: traMADoL (ULTRAM) 50 mg tablet     Sig: Take 50 mg by mouth every six (6) hours as needed for Pain.    traMADoL (ULTRAM) 50 mg tablet     Sig: Take 1 Tablet by mouth every six (6) hours as needed for Pain for up to 30 days. Max Daily Amount: 200 mg. Dispense:  30 Tablet     Refill:  0    gabapentin (NEURONTIN) 300 mg capsule     Sig: Take 1 Capsule by mouth nightly. Dispense:  90 Capsule     Refill:  1    rosuvastatin (CRESTOR) 10 mg tablet     Sig: Take 1 Tablet by mouth nightly. Indications: stroke prevention     Dispense:  90 Tablet     Refill:  1    olmesartan-hydroCHLOROthiazide (BENICAR HCT) 40-12.5 mg per tablet     Sig: Take 1 Tablet by mouth daily. Dispense:  90 Tablet     Refill:  3     DX Code Needed  .               Edwin Yang MD

## 2022-08-26 LAB
ALBUMIN SERPL-MCNC: 4 G/DL (ref 3.5–5)
ALBUMIN/GLOB SERPL: 1.2 {RATIO} (ref 1.1–2.2)
ALP SERPL-CCNC: 66 U/L (ref 45–117)
ALT SERPL-CCNC: 39 U/L (ref 12–78)
ANION GAP SERPL CALC-SCNC: 4 MMOL/L (ref 5–15)
AST SERPL-CCNC: 27 U/L (ref 15–37)
BASOPHILS # BLD: 0.1 K/UL (ref 0–0.1)
BASOPHILS NFR BLD: 1 % (ref 0–1)
BILIRUB SERPL-MCNC: 0.5 MG/DL (ref 0.2–1)
BUN SERPL-MCNC: 16 MG/DL (ref 6–20)
BUN/CREAT SERPL: 25 (ref 12–20)
CALCIUM SERPL-MCNC: 9.8 MG/DL (ref 8.5–10.1)
CHLORIDE SERPL-SCNC: 107 MMOL/L (ref 97–108)
CHOLEST SERPL-MCNC: 186 MG/DL
CO2 SERPL-SCNC: 29 MMOL/L (ref 21–32)
CREAT SERPL-MCNC: 0.64 MG/DL (ref 0.55–1.02)
DIFFERENTIAL METHOD BLD: NORMAL
EOSINOPHIL # BLD: 0.2 K/UL (ref 0–0.4)
EOSINOPHIL NFR BLD: 3 % (ref 0–7)
ERYTHROCYTE [DISTWIDTH] IN BLOOD BY AUTOMATED COUNT: 12.4 % (ref 11.5–14.5)
GLOBULIN SER CALC-MCNC: 3.3 G/DL (ref 2–4)
GLUCOSE SERPL-MCNC: 96 MG/DL (ref 65–100)
HCT VFR BLD AUTO: 44 % (ref 35–47)
HDLC SERPL-MCNC: 64 MG/DL
HDLC SERPL: 2.9 {RATIO} (ref 0–5)
HGB BLD-MCNC: 14.3 G/DL (ref 11.5–16)
IMM GRANULOCYTES # BLD AUTO: 0 K/UL (ref 0–0.04)
IMM GRANULOCYTES NFR BLD AUTO: 0 % (ref 0–0.5)
LDLC SERPL CALC-MCNC: 88 MG/DL (ref 0–100)
LYMPHOCYTES # BLD: 1.9 K/UL (ref 0.8–3.5)
LYMPHOCYTES NFR BLD: 38 % (ref 12–49)
MCH RBC QN AUTO: 31.2 PG (ref 26–34)
MCHC RBC AUTO-ENTMCNC: 32.5 G/DL (ref 30–36.5)
MCV RBC AUTO: 95.9 FL (ref 80–99)
MONOCYTES # BLD: 0.4 K/UL (ref 0–1)
MONOCYTES NFR BLD: 8 % (ref 5–13)
NEUTS SEG # BLD: 2.4 K/UL (ref 1.8–8)
NEUTS SEG NFR BLD: 50 % (ref 32–75)
NRBC # BLD: 0 K/UL (ref 0–0.01)
NRBC BLD-RTO: 0 PER 100 WBC
PLATELET # BLD AUTO: 289 K/UL (ref 150–400)
PMV BLD AUTO: 10.2 FL (ref 8.9–12.9)
POTASSIUM SERPL-SCNC: 4.9 MMOL/L (ref 3.5–5.1)
PROT SERPL-MCNC: 7.3 G/DL (ref 6.4–8.2)
RBC # BLD AUTO: 4.59 M/UL (ref 3.8–5.2)
SODIUM SERPL-SCNC: 140 MMOL/L (ref 136–145)
TRIGL SERPL-MCNC: 170 MG/DL (ref ?–150)
VLDLC SERPL CALC-MCNC: 34 MG/DL
WBC # BLD AUTO: 5 K/UL (ref 3.6–11)

## 2022-08-26 NOTE — PROGRESS NOTES
Normal blood count  Normal kidney and liver function  LDL bad cholesterol looks excellent HDL good cholesterol is also excellent. Mild elevation in triglycerides which is short-term fat storage.   Continue to work on healthy diet and weight loss to improve this

## 2022-10-12 NOTE — MR AVS SNAPSHOT
Visit Information Date & Time Provider Department Dept. Phone Encounter #  
 10/27/2017 10:00 AM Abril, [address] [place] [address],[address] [phone] [id number] Follow-up Instructions Return in about 4 months (around 2/27/2018) for fasting labs HTN. Upcoming Health Maintenance Date Due ZOSTER VACCINE AGE 60> 5/31/2011 Pneumococcal 65+ Low/Medium Risk (1 of 2 - PCV13) 7/31/2016 INFLUENZA AGE 9 TO ADULT 8/1/2017 MEDICARE YEARLY EXAM 10/28/2018 GLAUCOMA SCREENING Q2Y 3/1/2019 BREAST CANCER SCRN MAMMOGRAM 4/14/2019 COLONOSCOPY 7/10/2025 DTaP/Tdap/Td series (2 - Td) 3/10/2026 Allergies as of 10/27/2017  Review Complete On: 10/27/2017 By: MD Abril  
  
 Severity Noted Reaction Type Reactions Latex  05/26/2017    Rash Codeine  10/26/2015    Nausea and Vomiting Thiuram Analogues  03/23/2017    Rash Current Immunizations  Reviewed on 10/27/2015 Name Date Influenza High Dose Vaccine PF  Incomplete Influenza Vaccine (Quad) PF 10/27/2015  5:27 PM  
 Pneumococcal Conjugate (PCV-13)  Incomplete Tdap 3/10/2016 Not reviewed this visit You Were Diagnosed With   
  
 Codes Comments High cholesterol    -  Primary ICD-10-CM: E78.00 ICD-9-CM: 272.0 Medicare annual wellness visit, subsequent     ICD-10-CM: Z00.00 ICD-9-CM: V70.0 Encounter for immunization     ICD-10-CM: Q47 ICD-9-CM: V03.89 Essential hypertension     ICD-10-CM: I10 
ICD-9-CM: 401.9 Nausea     ICD-10-CM: R11.0 ICD-9-CM: 787.02 Teague's esophagus with dysplasia     ICD-10-CM: O92.075 ICD-9-CM: 530.85 Mild intermittent reactive airway disease without complication     JYS-15-PL: J45.20 ICD-9-CM: 493.90   
 NICOLÁS (obstructive sleep apnea)     ICD-10-CM: G47.33 
ICD-9-CM: 327.23 Chronic midline low back pain without sciatica     ICD-10-CM: M54.5, G89.29 ICD-9-CM: 724.2, 338.29  Primary insomnia     ICD-10-CM: F51.01 
 · Resolved  · Current sodium 141  · Repeat BMP on 10/18 ICD-9-CM: 307.42 Vitals BP Pulse Temp Resp Height(growth percentile) Weight(growth percentile) 128/80 (BP 1 Location: Right arm, BP Patient Position: Sitting) 66 97.8 °F (36.6 °C) (Oral) 18 5' 4\" (1.626 m) 170 lb 12.8 oz (77.5 kg) SpO2 BMI OB Status Smoking Status 99% 29.32 kg/m2 Hysterectomy Never Smoker BMI and BSA Data Body Mass Index Body Surface Area  
 29.32 kg/m 2 1.87 m 2 Preferred Pharmacy Pharmacy Name Phone [facility]. [phone] Your Updated Medication List  
  
   
This list is accurate as of: 10/27/17 10:19 AM.  Always use your most recent med list.  
  
  
  
  
 albuterol 90 mcg/actuation inhaler Commonly known as:  PROVENTIL HFA, VENTOLIN HFA, PROAIR HFA Take 1 Puff by inhalation every four (4) hours as needed for Wheezing. aspirin 81 mg chewable tablet Take 81 mg by mouth daily. calcium carbonate 500 mg calcium (1,250 mg) tablet Commonly known as:  OS-VJ Take 1 Tab by mouth daily. cpap machine kit  
by Does Not Apply route. desonide 0.05 % cream  
Commonly known as:  Manju Alicia Apply  to affected area as needed. FISH OIL 1,000 mg Cap Generic drug:  omega-3 fatty acids-vitamin e Take 3 Caps by mouth daily. FLONASE 50 mcg/actuation nasal spray Generic drug:  fluticasone 2 Sprays by Both Nostrils route daily. MUCINEX 600 mg ER tablet Generic drug:  guaiFENesin ER Take 600 mg by mouth two (2) times a day. omeprazole 20 mg capsule Commonly known as:  PRILOSEC Take 1 Cap by mouth daily. rosuvastatin 10 mg tablet Commonly known as:  CRESTOR Take 1 Tab by mouth nightly. suvorexant 10 mg tablet Commonly known as:  Meriel Mealy Take 1 Tab by mouth nightly as needed for Insomnia. Max Daily Amount: 10 mg.  
  
 timolol 0.5 % ophthalmic solution Commonly known as:  TIMOPTIC  
 Administer 1 Drop to both eyes daily. traMADol 50 mg tablet Commonly known as:  ULTRAM  
Take 1 Tab by mouth every twelve (12) hours as needed for Pain. Max Daily Amount: 100 mg.  
  
 valsartan-hydroCHLOROthiazide 320-25 mg per tablet Commonly known as:  DIOVAN-HCT Take 1 Tab by mouth daily. * VIGAMOX 0.5 % ophthalmic solution Generic drug:  moxifloxacin Administer 1 Drop to left eye four (4) times daily. * VIGAMOX 0.5 % ophthalmic solution Generic drug:  moxifloxacin Administer 1 Drop to right eye four (4) times daily. * Notice: This list has 2 medication(s) that are the same as other medications prescribed for you. Read the directions carefully, and ask your doctor or other care provider to review them with you. Prescriptions Printed Refills  
 cpap machine kit 0 Sig: by Does Not Apply route. Class: Print Route: Does Not Apply  
 traMADol (ULTRAM) 50 mg tablet 1 Sig: Take 1 Tab by mouth every twelve (12) hours as needed for Pain. Max Daily Amount: 100 mg. Class: Print Route: Oral  
 suvorexant (BELSOMRA) 10 mg tablet 0 Sig: Take 1 Tab by mouth nightly as needed for Insomnia. Max Daily Amount: 10 mg.  
 Class: Print Route: Oral  
  
Prescriptions Sent to Pharmacy Refills  
 rosuvastatin (CRESTOR) 10 mg tablet 3 Sig: Take 1 Tab by mouth nightly. Class: Normal  
 Pharmacy: [address] [place] [address]. Ph #: [phone] Route: Oral  
 valsartan-hydroCHLOROthiazide (DIOVAN-HCT) 320-25 mg per tablet 3 Sig: Take 1 Tab by mouth daily. Class: Normal  
 Pharmacy: [address] [place] [address]. Ph #: [phone] Route: Oral  
 omeprazole (PRILOSEC) 20 mg capsule 3 Sig: Take 1 Cap by mouth daily. Class: Normal  
 Pharmacy: [address] [place] [address]. Ph #: [phone]  Route: Oral  
 albuterol (PROVENTIL HFA, VENTOLIN HFA, PROAIR HFA) 90 mcg/actuation inhaler 3 Sig: Take 1 Puff by inhalation every four (4) hours as needed for Wheezing. Class: Normal  
 [address]  #: [phone] Route: Inhalation We Performed the Following ADMIN INFLUENZA VIRUS VAC [ HCPCS] INFLUENZA VIRUS VACCINE, HIGH DOSE SEASONAL, PRESERVATIVE FREE [10312 CPT(R)] PNEUMOCOCCAL CONJ VACCINE 13 VALENT IM [id number] CPT(R)] [place] IMMUNIZ,ADMIN,EACH ADDL [id number] CPT(R)] Follow-up Instructions Return in about 4 months (around 2/27/2018) for fasting labs HTN. Patient Instructions Medicare Wellness Visit, Female The best way to live healthy is to have a healthy lifestyle by eating a well-balanced diet, exercising regularly, limiting alcohol and stopping smoking. Regular physical exams and screening tests are another way to keep healthy. Preventive exams provided by your health care provider can find health problems before they become diseases or illnesses. Preventive services including immunizations, screening tests, monitoring and exams can help you take care of your own health. All people over age 72 should have a pneumovax  and and a prevnar shot to prevent pneumonia. These are once in a lifetime unless you and your provider decide differently. All people over 65 should have a yearly flu shot and a tetanus vaccine every 10 years. A bone mass density to screen for osteoporosis or thinning of the bones should be done every 2 years after 65. Screening for diabetes mellitus with a blood sugar test should be done every year.  
 
Glaucoma is a disease of the eye due to increased ocular pressure that can lead to blindness and it should be done every year by an eye professional. 
 
Cardiovascular screening tests that check for elevated lipids (fatty part of blood) which can lead to heart disease and strokes should be done every 5 years. Colorectal screening that evaluates for blood or polyps in your colon should be done yearly as a stool test or every five years as a flexible sigmoidoscope or every 10 years as a colonoscopy up to age 76. Breast cancer screening with a mammogram is recommended biennially  for women age 54-69. Screening for cervical cancer with a pap smear and pelvic exam is recommended for women after age 72 years every 2 years up to age 79 or when the provider and patient decide to stop. If there is a history of cervical abnormalities or other increased risk for cancer then the test is recommended yearly. Hepatitis C screening is also recommended for anyone born between 80 through [address]. A shingles vaccine is also recommended once in a lifetime after age 61. Your Medicare Wellness Exam is recommended annually. Here is a list of your current Health Maintenance items with a due date: 
Health Maintenance Due Topic Date Due  
 DTaP/Tdap/Td  (1 - Tdap) 07/31/1972  Shingles Vaccine  05/31/2011  Pneumococcal Vaccine (1 of 2 - PCV13) 07/31/2016 Erendira Browning Annual Well Visit  07/31/2016  Flu Vaccine  08/01/2017 [facility]! Dear Papito Giles: Thank you for requesting a [employer] account. Our records indicate that you already have an active [employer] account. You can access your account anytime at [email or web address] Did you know that you can access your hospital and ER discharge instructions at any time in [employer]? You can also review all of your test results from your hospital stay or ER visit. Additional Information If you have questions, please visit the Frequently Asked Questions section of the [employer] website at [email or web address] Remember, [employer] is NOT to be used for urgent needs. For medical emergencies, dial 911. Now available from your iPhone and Android! Please provide this summary of care documentation to your next provider. Your primary care clinician is listed as LANI Robles. If you have any questions after today's visit, please call [phone].

## 2023-01-23 DIAGNOSIS — E78.00 HIGH CHOLESTEROL: ICD-10-CM

## 2023-01-23 RX ORDER — ROSUVASTATIN CALCIUM 10 MG/1
10 TABLET, COATED ORAL
Qty: 90 TABLET | Refills: 1 | Status: SHIPPED | OUTPATIENT
Start: 2023-01-23

## 2023-03-12 DIAGNOSIS — G89.29 CHRONIC MIDLINE LOW BACK PAIN WITHOUT SCIATICA: ICD-10-CM

## 2023-03-12 DIAGNOSIS — M54.50 CHRONIC MIDLINE LOW BACK PAIN WITHOUT SCIATICA: ICD-10-CM

## 2023-03-15 RX ORDER — TRAMADOL HYDROCHLORIDE 50 MG/1
50 TABLET ORAL
Qty: 30 TABLET | Refills: 0 | Status: SHIPPED | OUTPATIENT
Start: 2023-03-15 | End: 2023-04-14

## 2023-04-23 ENCOUNTER — HOSPITAL ENCOUNTER (OUTPATIENT)
Dept: MRI IMAGING | Age: 73
Discharge: HOME | End: 2023-04-23
Attending: SPECIALIST
Payer: MEDICARE

## 2023-04-23 DIAGNOSIS — M54.16 LUMBAR RADICULOPATHY: Primary | ICD-10-CM

## 2023-04-23 DIAGNOSIS — M54.16 LUMBAR RADICULOPATHY: ICD-10-CM

## 2023-04-23 PROCEDURE — 72148 MRI LUMBAR SPINE W/O DYE: CPT

## 2023-05-08 ENCOUNTER — TELEPHONE (OUTPATIENT)
Age: 73
End: 2023-05-08

## 2023-06-10 ENCOUNTER — HOSPITAL ENCOUNTER (OUTPATIENT)
Facility: HOSPITAL | Age: 73
End: 2023-06-10
Payer: MEDICARE

## 2023-06-10 DIAGNOSIS — R06.02 SHORTNESS OF BREATH: ICD-10-CM

## 2023-06-10 PROCEDURE — 71046 X-RAY EXAM CHEST 2 VIEWS: CPT

## 2023-06-19 ENCOUNTER — HOSPITAL ENCOUNTER (OUTPATIENT)
Facility: HOSPITAL | Age: 73
Discharge: HOME | End: 2023-06-22
Attending: INTERNAL MEDICINE
Payer: MEDICARE

## 2023-06-19 DIAGNOSIS — R91.1 SOLITARY PULMONARY NODULE: ICD-10-CM

## 2023-06-19 PROCEDURE — 71250 CT THORAX DX C-: CPT

## 2023-09-05 ENCOUNTER — OFFICE VISIT (OUTPATIENT)
Age: 73
End: 2023-09-05
Payer: MEDICARE

## 2023-09-05 VITALS
OXYGEN SATURATION: 96 % | BODY MASS INDEX: 30.07 KG/M2 | HEIGHT: 62 IN | SYSTOLIC BLOOD PRESSURE: 112 MMHG | RESPIRATION RATE: 18 BRPM | TEMPERATURE: 97.4 F | HEART RATE: 60 BPM | DIASTOLIC BLOOD PRESSURE: 68 MMHG | WEIGHT: 163.4 LBS

## 2023-09-05 DIAGNOSIS — M54.42 CHRONIC BILATERAL LOW BACK PAIN WITH BILATERAL SCIATICA: ICD-10-CM

## 2023-09-05 DIAGNOSIS — G47.33 OBSTRUCTIVE SLEEP APNEA (ADULT) (PEDIATRIC): ICD-10-CM

## 2023-09-05 DIAGNOSIS — F11.99 OPIOID USE, UNSPECIFIED WITH UNSPECIFIED OPIOID-INDUCED DISORDER (HCC): ICD-10-CM

## 2023-09-05 DIAGNOSIS — E78.00 HIGH CHOLESTEROL: ICD-10-CM

## 2023-09-05 DIAGNOSIS — Z12.31 ENCOUNTER FOR SCREENING MAMMOGRAM FOR MALIGNANT NEOPLASM OF BREAST: ICD-10-CM

## 2023-09-05 DIAGNOSIS — K21.9 GASTROESOPHAGEAL REFLUX DISEASE WITHOUT ESOPHAGITIS: ICD-10-CM

## 2023-09-05 DIAGNOSIS — R73.03 PRE-DIABETES: ICD-10-CM

## 2023-09-05 DIAGNOSIS — F51.01 PRIMARY INSOMNIA: ICD-10-CM

## 2023-09-05 DIAGNOSIS — M54.41 CHRONIC BILATERAL LOW BACK PAIN WITH BILATERAL SCIATICA: ICD-10-CM

## 2023-09-05 DIAGNOSIS — M47.26 OTHER SPONDYLOSIS WITH RADICULOPATHY, LUMBAR REGION: ICD-10-CM

## 2023-09-05 DIAGNOSIS — Z00.00 MEDICARE ANNUAL WELLNESS VISIT, SUBSEQUENT: Primary | ICD-10-CM

## 2023-09-05 DIAGNOSIS — G89.29 CHRONIC BILATERAL LOW BACK PAIN WITH BILATERAL SCIATICA: ICD-10-CM

## 2023-09-05 DIAGNOSIS — I10 ESSENTIAL (PRIMARY) HYPERTENSION: ICD-10-CM

## 2023-09-05 DIAGNOSIS — G25.81 RESTLESS LEGS SYNDROME: ICD-10-CM

## 2023-09-05 LAB
BASOPHILS # BLD: 0 K/UL (ref 0–0.1)
BASOPHILS NFR BLD: 1 % (ref 0–1)
DIFFERENTIAL METHOD BLD: NORMAL
EOSINOPHIL # BLD: 0.1 K/UL (ref 0–0.4)
EOSINOPHIL NFR BLD: 2 % (ref 0–7)
ERYTHROCYTE [DISTWIDTH] IN BLOOD BY AUTOMATED COUNT: 11.9 % (ref 11.5–14.5)
HCT VFR BLD AUTO: 44.2 % (ref 35–47)
HGB BLD-MCNC: 14.2 G/DL (ref 11.5–16)
IMM GRANULOCYTES # BLD AUTO: 0 K/UL (ref 0–0.04)
IMM GRANULOCYTES NFR BLD AUTO: 0 % (ref 0–0.5)
LYMPHOCYTES # BLD: 1.5 K/UL (ref 0.8–3.5)
LYMPHOCYTES NFR BLD: 32 % (ref 12–49)
MCH RBC QN AUTO: 30.6 PG (ref 26–34)
MCHC RBC AUTO-ENTMCNC: 32.1 G/DL (ref 30–36.5)
MCV RBC AUTO: 95.3 FL (ref 80–99)
MONOCYTES # BLD: 0.5 K/UL (ref 0–1)
MONOCYTES NFR BLD: 11 % (ref 5–13)
NEUTS SEG # BLD: 2.6 K/UL (ref 1.8–8)
NEUTS SEG NFR BLD: 54 % (ref 32–75)
NRBC # BLD: 0 K/UL (ref 0–0.01)
NRBC BLD-RTO: 0 PER 100 WBC
PLATELET # BLD AUTO: 286 K/UL (ref 150–400)
PMV BLD AUTO: 10.6 FL (ref 8.9–12.9)
RBC # BLD AUTO: 4.64 M/UL (ref 3.8–5.2)
WBC # BLD AUTO: 4.8 K/UL (ref 3.6–11)

## 2023-09-05 PROCEDURE — 3017F COLORECTAL CA SCREEN DOC REV: CPT | Performed by: INTERNAL MEDICINE

## 2023-09-05 PROCEDURE — G0439 PPPS, SUBSEQ VISIT: HCPCS | Performed by: INTERNAL MEDICINE

## 2023-09-05 PROCEDURE — 3078F DIAST BP <80 MM HG: CPT | Performed by: INTERNAL MEDICINE

## 2023-09-05 PROCEDURE — 1123F ACP DISCUSS/DSCN MKR DOCD: CPT | Performed by: INTERNAL MEDICINE

## 2023-09-05 PROCEDURE — 3074F SYST BP LT 130 MM HG: CPT | Performed by: INTERNAL MEDICINE

## 2023-09-05 RX ORDER — ROSUVASTATIN CALCIUM 10 MG/1
10 TABLET, COATED ORAL DAILY
Qty: 90 TABLET | Refills: 1 | Status: SHIPPED | OUTPATIENT
Start: 2023-09-05

## 2023-09-05 RX ORDER — TRAMADOL HYDROCHLORIDE 50 MG/1
50 TABLET ORAL EVERY 4 HOURS PRN
Qty: 30 TABLET | Refills: 0 | Status: SHIPPED | OUTPATIENT
Start: 2023-09-05 | End: 2023-09-10

## 2023-09-05 RX ORDER — OMEPRAZOLE 40 MG/1
40 CAPSULE, DELAYED RELEASE ORAL
Qty: 60 CAPSULE | Refills: 1
Start: 2023-09-05

## 2023-09-05 RX ORDER — OLMESARTAN MEDOXOMIL AND HYDROCHLOROTHIAZIDE 40/12.5 40; 12.5 MG/1; MG/1
1 TABLET ORAL DAILY
Qty: 90 TABLET | Refills: 1 | Status: SHIPPED | OUTPATIENT
Start: 2023-09-05

## 2023-09-05 SDOH — ECONOMIC STABILITY: FOOD INSECURITY: WITHIN THE PAST 12 MONTHS, YOU WORRIED THAT YOUR FOOD WOULD RUN OUT BEFORE YOU GOT MONEY TO BUY MORE.: NEVER TRUE

## 2023-09-05 SDOH — ECONOMIC STABILITY: TRANSPORTATION INSECURITY
IN THE PAST 12 MONTHS, HAS THE LACK OF TRANSPORTATION KEPT YOU FROM MEDICAL APPOINTMENTS OR FROM GETTING MEDICATIONS?: NO

## 2023-09-05 SDOH — ECONOMIC STABILITY: TRANSPORTATION INSECURITY
IN THE PAST 12 MONTHS, HAS LACK OF TRANSPORTATION KEPT YOU FROM MEETINGS, WORK, OR FROM GETTING THINGS NEEDED FOR DAILY LIVING?: NO

## 2023-09-05 SDOH — ECONOMIC STABILITY: FOOD INSECURITY: WITHIN THE PAST 12 MONTHS, THE FOOD YOU BOUGHT JUST DIDN'T LAST AND YOU DIDN'T HAVE MONEY TO GET MORE.: NEVER TRUE

## 2023-09-05 ASSESSMENT — PATIENT HEALTH QUESTIONNAIRE - PHQ9
SUM OF ALL RESPONSES TO PHQ QUESTIONS 1-9: 0
SUM OF ALL RESPONSES TO PHQ QUESTIONS 1-9: 0
SUM OF ALL RESPONSES TO PHQ9 QUESTIONS 1 & 2: 0
2. FEELING DOWN, DEPRESSED OR HOPELESS: 0
1. LITTLE INTEREST OR PLEASURE IN DOING THINGS: 0
SUM OF ALL RESPONSES TO PHQ QUESTIONS 1-9: 0
SUM OF ALL RESPONSES TO PHQ QUESTIONS 1-9: 0

## 2023-09-05 ASSESSMENT — LIFESTYLE VARIABLES
HOW MANY STANDARD DRINKS CONTAINING ALCOHOL DO YOU HAVE ON A TYPICAL DAY: PATIENT DOES NOT DRINK
HOW OFTEN DO YOU HAVE A DRINK CONTAINING ALCOHOL: NEVER

## 2023-09-05 ASSESSMENT — SOCIAL DETERMINANTS OF HEALTH (SDOH): HOW HARD IS IT FOR YOU TO PAY FOR THE VERY BASICS LIKE FOOD, HOUSING, MEDICAL CARE, AND HEATING?: NOT HARD AT ALL

## 2023-09-05 NOTE — PATIENT INSTRUCTIONS

## 2023-09-05 NOTE — PROGRESS NOTES
Medicare Annual Wellness Visit    Beena Wheeler is here for Medicare AWV    Assessment & Plan   Medicare annual wellness visit, subsequent  Restless legs syndrome - discussed taking gabapentin regularly   Obstructive sleep apnea (adult) (pediatric)- wears CPAP  Essential (primary) hypertension - well controlled on benicar  -     Comprehensive Metabolic Panel; Future  -     CBC with Auto Differential; Future  Primary insomnia: not on medication , lifestyle sleep hygiene  Chronic bilateral low back pain with bilateral sciatica: has had B/L RFA July 2023 Dr Lorin Alba  -     traMADol (ULTRAM) 50 MG tablet; Take 1 tablet by mouth every 4 hours as needed for Pain for up to 5 days. Intended supply: 5 days. Take lowest dose possible to manage pain Max Daily Amount: 300 mg, Disp-30 tablet, R-0Normal  Opioid use, unspecified with unspecified opioid-induced disorder ([address])  Gastroesophageal reflux disease without esophagitis - severe  Considering [facility] procedure follows with Dr Mirza Martinez  -     omeprazole (PRILOSEC) 40 MG delayed release capsule; Take 1 capsule by mouth 2 times daily (before meals), Disp-60 capsule, R-1NO PRINT  Pre-diabetes: diet controlled   -     Hemoglobin A1C; Future  -     Lipid Panel; Future  High cholesterol: on crestor 10mg   -     Comprehensive Metabolic Panel; Future  -     Lipid Panel; Future  Recommendations for Preventive Services Due: see orders and patient instructions/AVS.  Recommended screening schedule for the next 5-10 years is provided to the patient in written form: see Patient Instructions/AVS.     Return in 6 months (on 3/5/2024).      Subjective   Started having pronounced back pain in late June 2018 -  Patient saw Dr Magda Callahan and had procedure \" severing the nerve\" and had great success at the time and pain recurred and had another MRI back with degenerative changes and also congenital narrowing of the lumbar canal. July 2023 underwent B/L RFA and noted only improvement on the right

## 2023-09-05 NOTE — PROGRESS NOTES
1. \"Have you been to the ER, urgent care clinic since your last visit? Hospitalized since your last visit? Urgent Care, had Covid. 2. \"Have you seen or consulted any other health care providers outside of the [address] [place] [address] since your last visit?no    3. For patients aged 43-73: Has the patient had a colonoscopy / FIT/ Cologuard? Yes - no Care Gap present      If the patient is female:    4. For patients aged 43-66: Has the patient had a mammogram within the past 2 years? No      5. For patients aged 21-65: Has the patient had a pap smear?  NA - based on age or sex

## 2023-09-06 LAB
ALBUMIN SERPL-MCNC: 4 G/DL (ref 3.5–5)
ALBUMIN/GLOB SERPL: 1.2 (ref 1.1–2.2)
ALP SERPL-CCNC: 73 U/L (ref 45–117)
ALT SERPL-CCNC: 29 U/L (ref 12–78)
ANION GAP SERPL CALC-SCNC: 8 MMOL/L (ref 5–15)
AST SERPL-CCNC: 22 U/L (ref 15–37)
BILIRUB SERPL-MCNC: 0.4 MG/DL (ref 0.2–1)
BUN SERPL-MCNC: 14 MG/DL (ref 6–20)
BUN/CREAT SERPL: 18 (ref 12–20)
CALCIUM SERPL-MCNC: 9.7 MG/DL (ref 8.5–10.1)
CHLORIDE SERPL-SCNC: 106 MMOL/L (ref 97–108)
CHOLEST SERPL-MCNC: 178 MG/DL
CO2 SERPL-SCNC: 28 MMOL/L (ref 21–32)
CREAT SERPL-MCNC: 0.76 MG/DL (ref 0.55–1.02)
EST. AVERAGE GLUCOSE BLD GHB EST-MCNC: 123 MG/DL
GLOBULIN SER CALC-MCNC: 3.4 G/DL (ref 2–4)
GLUCOSE SERPL-MCNC: 97 MG/DL (ref 65–100)
HBA1C MFR BLD: 5.9 % (ref 4–5.6)
HDLC SERPL-MCNC: 60 MG/DL
HDLC SERPL: 3 (ref 0–5)
LDLC SERPL CALC-MCNC: 92 MG/DL (ref 0–100)
POTASSIUM SERPL-SCNC: 3.8 MMOL/L (ref 3.5–5.1)
PROT SERPL-MCNC: 7.4 G/DL (ref 6.4–8.2)
SODIUM SERPL-SCNC: 142 MMOL/L (ref 136–145)
TRIGL SERPL-MCNC: 130 MG/DL
VLDLC SERPL CALC-MCNC: 26 MG/DL

## 2023-09-10 LAB — DRUGS UR: NORMAL

## 2023-09-13 NOTE — PATIENT INSTRUCTIONS
Doing labs today     Take miralax daily until you have bowel movement and use suppository as needed    Schedule LESLEY     Try elavil at bedtime Erroneous encounter.

## 2023-09-18 ENCOUNTER — TELEPHONE (OUTPATIENT)
Age: 73
End: 2023-09-18

## 2023-09-18 DIAGNOSIS — G25.81 RESTLESS LEGS SYNDROME: Primary | ICD-10-CM

## 2023-09-18 RX ORDER — GABAPENTIN 300 MG/1
300 CAPSULE ORAL NIGHTLY
Qty: 90 CAPSULE | Refills: 1 | Status: SHIPPED | OUTPATIENT
Start: 2023-09-18 | End: 2024-03-16

## 2023-09-18 NOTE — TELEPHONE ENCOUNTER
Pt had labs done on 9-5-23 and has not received lab results even in My Chart? Please call pt about this.

## 2023-09-18 NOTE — TELEPHONE ENCOUNTER
gabapentin (NEURONTIN) 300 MG capsule    Refil to [facility] #[phone]    Pt states this was discussed and she is going back on this medication.

## 2023-12-12 DIAGNOSIS — M54.41 CHRONIC BILATERAL LOW BACK PAIN WITH BILATERAL SCIATICA: Primary | ICD-10-CM

## 2023-12-12 DIAGNOSIS — G89.29 CHRONIC BILATERAL LOW BACK PAIN WITH BILATERAL SCIATICA: Primary | ICD-10-CM

## 2023-12-12 DIAGNOSIS — M54.42 CHRONIC BILATERAL LOW BACK PAIN WITH BILATERAL SCIATICA: Primary | ICD-10-CM

## 2023-12-13 RX ORDER — TRAMADOL HYDROCHLORIDE 50 MG/1
50 TABLET ORAL EVERY 4 HOURS PRN
Qty: 30 TABLET | Refills: 0 | Status: SHIPPED | OUTPATIENT
Start: 2023-12-13 | End: 2023-12-18

## 2024-01-02 ENCOUNTER — TELEPHONE (OUTPATIENT)
Age: 74
End: 2024-01-02

## 2024-01-02 NOTE — TELEPHONE ENCOUNTER
Patient states she needs a call back to discuss getting her Gabapentin Dosage increased to 400 Mgs. Patient has upcoming appt on 3/11/24 & needs to know if appt required prior to being increased or to discuss. Please call. Thank you

## 2024-01-04 DIAGNOSIS — G25.81 RESTLESS LEGS SYNDROME: Primary | ICD-10-CM

## 2024-01-04 RX ORDER — GABAPENTIN 400 MG/1
400 CAPSULE ORAL DAILY
Qty: 30 CAPSULE | Refills: 0 | Status: SHIPPED | OUTPATIENT
Start: 2024-01-04 | End: 2024-02-03

## 2024-02-22 ENCOUNTER — TELEPHONE (OUTPATIENT)
Age: 74
End: 2024-02-22

## 2024-02-22 NOTE — TELEPHONE ENCOUNTER
Left message for patient to schedule mammogram ordered by PCP  Requested patient to return call to panel manager at [phone] to schedule mammogram or notify that mammogram   has been completed at an outside facility.

## 2024-02-23 DIAGNOSIS — E78.00 HIGH CHOLESTEROL: ICD-10-CM

## 2024-02-23 DIAGNOSIS — G25.81 RESTLESS LEGS SYNDROME: ICD-10-CM

## 2024-02-23 DIAGNOSIS — I10 ESSENTIAL (PRIMARY) HYPERTENSION: ICD-10-CM

## 2024-02-24 RX ORDER — ROSUVASTATIN CALCIUM 10 MG/1
10 TABLET, COATED ORAL DAILY
Qty: 90 TABLET | Refills: 1 | Status: SHIPPED | OUTPATIENT
Start: 2024-02-24

## 2024-02-24 RX ORDER — OLMESARTAN MEDOXOMIL AND HYDROCHLOROTHIAZIDE 40/12.5 40; 12.5 MG/1; MG/1
1 TABLET ORAL DAILY
Qty: 90 TABLET | Refills: 1 | Status: SHIPPED | OUTPATIENT
Start: 2024-02-24

## 2024-02-24 RX ORDER — GABAPENTIN 400 MG/1
400 CAPSULE ORAL DAILY
Qty: 90 CAPSULE | Refills: 0 | Status: SHIPPED | OUTPATIENT
Start: 2024-02-24 | End: 2024-05-24

## 2024-03-11 ENCOUNTER — OFFICE VISIT (OUTPATIENT)
Age: 74
End: 2024-03-11
Payer: MEDICARE

## 2024-03-11 VITALS
DIASTOLIC BLOOD PRESSURE: 69 MMHG | TEMPERATURE: 97.8 F | SYSTOLIC BLOOD PRESSURE: 115 MMHG | HEIGHT: 62 IN | WEIGHT: 159 LBS | HEART RATE: 61 BPM | OXYGEN SATURATION: 99 % | BODY MASS INDEX: 29.26 KG/M2 | RESPIRATION RATE: 18 BRPM

## 2024-03-11 DIAGNOSIS — K21.9 GASTROESOPHAGEAL REFLUX DISEASE WITHOUT ESOPHAGITIS: ICD-10-CM

## 2024-03-11 DIAGNOSIS — M48.062 SPINAL STENOSIS OF LUMBAR REGION WITH NEUROGENIC CLAUDICATION: Primary | ICD-10-CM

## 2024-03-11 DIAGNOSIS — R73.03 PRE-DIABETES: ICD-10-CM

## 2024-03-11 DIAGNOSIS — E78.00 HIGH CHOLESTEROL: ICD-10-CM

## 2024-03-11 DIAGNOSIS — I10 ESSENTIAL (PRIMARY) HYPERTENSION: ICD-10-CM

## 2024-03-11 PROCEDURE — 1090F PRES/ABSN URINE INCON ASSESS: CPT | Performed by: INTERNAL MEDICINE

## 2024-03-11 PROCEDURE — 1036F TOBACCO NON-USER: CPT | Performed by: INTERNAL MEDICINE

## 2024-03-11 PROCEDURE — G8419 CALC BMI OUT NRM PARAM NOF/U: HCPCS | Performed by: INTERNAL MEDICINE

## 2024-03-11 PROCEDURE — G8427 DOCREV CUR MEDS BY ELIG CLIN: HCPCS | Performed by: INTERNAL MEDICINE

## 2024-03-11 PROCEDURE — 99214 OFFICE O/P EST MOD 30 MIN: CPT | Performed by: INTERNAL MEDICINE

## 2024-03-11 PROCEDURE — G8484 FLU IMMUNIZE NO ADMIN: HCPCS | Performed by: INTERNAL MEDICINE

## 2024-03-11 PROCEDURE — 3017F COLORECTAL CA SCREEN DOC REV: CPT | Performed by: INTERNAL MEDICINE

## 2024-03-11 PROCEDURE — G8399 PT W/DXA RESULTS DOCUMENT: HCPCS | Performed by: INTERNAL MEDICINE

## 2024-03-11 PROCEDURE — 3078F DIAST BP <80 MM HG: CPT | Performed by: INTERNAL MEDICINE

## 2024-03-11 PROCEDURE — 1123F ACP DISCUSS/DSCN MKR DOCD: CPT | Performed by: INTERNAL MEDICINE

## 2024-03-11 PROCEDURE — 3074F SYST BP LT 130 MM HG: CPT | Performed by: INTERNAL MEDICINE

## 2024-03-11 RX ORDER — OLMESARTAN MEDOXOMIL AND HYDROCHLOROTHIAZIDE 40/12.5 40; 12.5 MG/1; MG/1
1 TABLET ORAL DAILY
Qty: 90 TABLET | Refills: 1 | Status: SHIPPED | OUTPATIENT
Start: 2024-03-11

## 2024-03-11 RX ORDER — TRAMADOL HYDROCHLORIDE 50 MG/1
50 TABLET ORAL EVERY 4 HOURS PRN
Qty: 30 TABLET | Refills: 0 | Status: SHIPPED | OUTPATIENT
Start: 2024-03-11 | End: 2024-03-16

## 2024-03-11 RX ORDER — ROSUVASTATIN CALCIUM 10 MG/1
10 TABLET, COATED ORAL DAILY
Qty: 90 TABLET | Refills: 1 | Status: SHIPPED | OUTPATIENT
Start: 2024-03-11

## 2024-03-11 SDOH — ECONOMIC STABILITY: FOOD INSECURITY: WITHIN THE PAST 12 MONTHS, YOU WORRIED THAT YOUR FOOD WOULD RUN OUT BEFORE YOU GOT MONEY TO BUY MORE.: NEVER TRUE

## 2024-03-11 SDOH — ECONOMIC STABILITY: INCOME INSECURITY: HOW HARD IS IT FOR YOU TO PAY FOR THE VERY BASICS LIKE FOOD, HOUSING, MEDICAL CARE, AND HEATING?: NOT HARD AT ALL

## 2024-03-11 SDOH — ECONOMIC STABILITY: FOOD INSECURITY: WITHIN THE PAST 12 MONTHS, THE FOOD YOU BOUGHT JUST DIDN'T LAST AND YOU DIDN'T HAVE MONEY TO GET MORE.: NEVER TRUE

## 2024-03-11 SDOH — ECONOMIC STABILITY: HOUSING INSECURITY
IN THE LAST 12 MONTHS, WAS THERE A TIME WHEN YOU DID NOT HAVE A STEADY PLACE TO SLEEP OR SLEPT IN A SHELTER (INCLUDING NOW)?: NO

## 2024-03-11 ASSESSMENT — PATIENT HEALTH QUESTIONNAIRE - PHQ9
SUM OF ALL RESPONSES TO PHQ QUESTIONS 1-9: 0
2. FEELING DOWN, DEPRESSED OR HOPELESS: 0
SUM OF ALL RESPONSES TO PHQ QUESTIONS 1-9: 0
SUM OF ALL RESPONSES TO PHQ QUESTIONS 1-9: 0
1. LITTLE INTEREST OR PLEASURE IN DOING THINGS: 0
SUM OF ALL RESPONSES TO PHQ QUESTIONS 1-9: 0
SUM OF ALL RESPONSES TO PHQ9 QUESTIONS 1 & 2: 0

## 2024-03-11 NOTE — PATIENT INSTRUCTIONS
I suspect spinal stenosis is progressing and monitor for symptoms of increased shooting pain and weakness. If pain worsens down your leg will need MRI of back

## 2024-03-11 NOTE — PROGRESS NOTES
Chief Complaint   Patient presents with    Leg Pain     Bilateral leg pain, more dominant in right     \"Have you been to the ER, urgent care clinic since your last visit?  Hospitalized since your last visit?\"    NO    “Have you seen or consulted any other health care providers outside of [facility] since your last visit?”    NO       Have you had a mammogram?”   NO        
1    3. Spinal stenosis of lumbar region with neurogenic claudication  Patient saw Dr Santos and had procedure \" severing the nerve\" and had great success at the time and pain recurred and had another MRI back with degenerative changes and also congenital narrowing of the lumbar canal. July 2023 underwent B/L RFA and noted only improvement on the right but now having recurrent symptoms   Discussed to monitor symptoms and notify if weakness or decreased sensation - discussed signs of severe stenosis. Patient wants to hold off on re imaging for now  Continue gabapentin  Tramadol as needed  - traMADol (ULTRAM) 50 MG tablet; Take 1 tablet by mouth every 4 hours as needed for Pain for up to 5 days. Intended supply: 5 days. Take lowest dose possible to manage pain Max Daily Amount: 300 mg  Dispense: 30 tablet; Refill: 0    4. Gastroesophageal reflux disease without esophagitis  Going to PT with pulm in [place] working on swallowing  On PPI      5. Pre-diabetes  Working on low sugar diet and walking      Return in about 3 months (around 6/11/2024).     Sona Osman MD

## 2024-04-10 LAB — FERRITIN SERPL-MCNC: 65 NG/ML (ref 8–252)

## 2024-05-14 DIAGNOSIS — G25.81 RESTLESS LEGS SYNDROME: ICD-10-CM

## 2024-05-14 RX ORDER — GABAPENTIN 400 MG/1
400 CAPSULE ORAL DAILY
Qty: 90 CAPSULE | Refills: 0 | Status: SHIPPED | OUTPATIENT
Start: 2024-05-14 | End: 2024-08-12

## 2024-06-24 ENCOUNTER — OFFICE VISIT (OUTPATIENT)
Age: 74
End: 2024-06-24
Payer: MEDICARE

## 2024-06-24 VITALS
HEIGHT: 62 IN | HEART RATE: 56 BPM | DIASTOLIC BLOOD PRESSURE: 73 MMHG | WEIGHT: 154.6 LBS | TEMPERATURE: 97.5 F | BODY MASS INDEX: 28.45 KG/M2 | OXYGEN SATURATION: 99 % | RESPIRATION RATE: 18 BRPM | SYSTOLIC BLOOD PRESSURE: 110 MMHG

## 2024-06-24 DIAGNOSIS — R73.03 PRE-DIABETES: ICD-10-CM

## 2024-06-24 DIAGNOSIS — G47.33 OSA ON CPAP: Primary | ICD-10-CM

## 2024-06-24 DIAGNOSIS — K21.9 GASTROESOPHAGEAL REFLUX DISEASE WITHOUT ESOPHAGITIS: ICD-10-CM

## 2024-06-24 DIAGNOSIS — I49.3 PVC'S (PREMATURE VENTRICULAR CONTRACTIONS): ICD-10-CM

## 2024-06-24 DIAGNOSIS — E56.9 VITAMIN DEFICIENCY: ICD-10-CM

## 2024-06-24 DIAGNOSIS — E55.9 VITAMIN D DEFICIENCY: ICD-10-CM

## 2024-06-24 DIAGNOSIS — E78.00 HIGH CHOLESTEROL: ICD-10-CM

## 2024-06-24 DIAGNOSIS — I10 ESSENTIAL (PRIMARY) HYPERTENSION: ICD-10-CM

## 2024-06-24 DIAGNOSIS — G25.81 RESTLESS LEGS SYNDROME: ICD-10-CM

## 2024-06-24 DIAGNOSIS — M48.062 SPINAL STENOSIS OF LUMBAR REGION WITH NEUROGENIC CLAUDICATION: ICD-10-CM

## 2024-06-24 DIAGNOSIS — F11.99 OPIOID USE, UNSPECIFIED WITH UNSPECIFIED OPIOID-INDUCED DISORDER (HCC): ICD-10-CM

## 2024-06-24 DIAGNOSIS — M47.26 OTHER SPONDYLOSIS WITH RADICULOPATHY, LUMBAR REGION: ICD-10-CM

## 2024-06-24 PROCEDURE — 99214 OFFICE O/P EST MOD 30 MIN: CPT | Performed by: INTERNAL MEDICINE

## 2024-06-24 PROCEDURE — 1090F PRES/ABSN URINE INCON ASSESS: CPT | Performed by: INTERNAL MEDICINE

## 2024-06-24 PROCEDURE — 1036F TOBACCO NON-USER: CPT | Performed by: INTERNAL MEDICINE

## 2024-06-24 PROCEDURE — G8419 CALC BMI OUT NRM PARAM NOF/U: HCPCS | Performed by: INTERNAL MEDICINE

## 2024-06-24 PROCEDURE — 3017F COLORECTAL CA SCREEN DOC REV: CPT | Performed by: INTERNAL MEDICINE

## 2024-06-24 PROCEDURE — 3074F SYST BP LT 130 MM HG: CPT | Performed by: INTERNAL MEDICINE

## 2024-06-24 PROCEDURE — 3078F DIAST BP <80 MM HG: CPT | Performed by: INTERNAL MEDICINE

## 2024-06-24 PROCEDURE — 1123F ACP DISCUSS/DSCN MKR DOCD: CPT | Performed by: INTERNAL MEDICINE

## 2024-06-24 PROCEDURE — G8399 PT W/DXA RESULTS DOCUMENT: HCPCS | Performed by: INTERNAL MEDICINE

## 2024-06-24 PROCEDURE — G8427 DOCREV CUR MEDS BY ELIG CLIN: HCPCS | Performed by: INTERNAL MEDICINE

## 2024-06-24 RX ORDER — OLMESARTAN MEDOXOMIL AND HYDROCHLOROTHIAZIDE 40/12.5 40; 12.5 MG/1; MG/1
1 TABLET ORAL DAILY
Qty: 90 TABLET | Refills: 1 | Status: SHIPPED | OUTPATIENT
Start: 2024-06-24

## 2024-06-24 RX ORDER — TRAMADOL HYDROCHLORIDE 50 MG/1
50 TABLET ORAL EVERY 6 HOURS PRN
COMMUNITY

## 2024-06-24 RX ORDER — OMEPRAZOLE 40 MG/1
40 CAPSULE, DELAYED RELEASE ORAL
Qty: 60 CAPSULE | Refills: 1 | Status: SHIPPED | OUTPATIENT
Start: 2024-06-24

## 2024-06-24 RX ORDER — FERROUS SULFATE 325(65) MG
325 TABLET, DELAYED RELEASE (ENTERIC COATED) ORAL
COMMUNITY

## 2024-06-24 RX ORDER — ROSUVASTATIN CALCIUM 10 MG/1
10 TABLET, COATED ORAL DAILY
Qty: 90 TABLET | Refills: 1 | Status: SHIPPED | OUTPATIENT
Start: 2024-06-24

## 2024-06-24 RX ORDER — GABAPENTIN 400 MG/1
400 CAPSULE ORAL DAILY
Qty: 90 CAPSULE | Refills: 0 | Status: SHIPPED | OUTPATIENT
Start: 2024-06-24 | End: 2024-09-22

## 2024-06-24 NOTE — PROGRESS NOTES
\"Have you been to the ER, urgent care clinic since your last visit?  Hospitalized since your last visit?\"    NO    “Have you seen or consulted any other health care providers outside of [facility] since your last visit?”    NO    Have you had a mammogram?”   NO    Date of last Mammogram: 11/11/2021             Click Here for Release of Records Request

## 2024-06-24 NOTE — PROGRESS NOTES
Ms. Christina Odonnell is presenting to follow up     CC:  Hypertension       HPI:    Ms. Christina Odonnell   is a 73 y.o. female with a hx of HTN and restless leg presenting to to follow up     Recall chronic back pain   Started having pronounced back pain in late June 2018 -  Patient saw Dr Santos and had procedure \" severing the nerve\" and had great success at the time and pain recurred and had another MRI back with degenerative changes and also congenital narrowing of the lumbar canal. July 2023 underwent B/L RFA and noted only improvement on the right      James been rarely taking tramadol -rarely for severe pain. Takes 50mg to 100mg if severe pain takes it specially after  doing more exercise  Tramadol Improves quality of life       Restless leg is bothersome -gabapentin as needed   We increased gabapentin to 400mg and minimal help   Iron supplement has helped      HTN: BP is well controlled  On olmesartan - HCTZ 40-12.5mg    no chest pain  Notes frequent \" skipped beats in the heart - used stethoscope and suspects PVC)     GERD   Dr Hwang and increased omeprazole to 50mg BID  She did therapy with speech pathologist and swallow training has helped  Diet modification has helped    Insomnia: tried ambien and belsomra / belsomra initially worked at 10mg then stopped working -currently not on medication         Colonoscopy reviewed report today June 23 2021 and repeat in 5 years     Review of systems:  Constitutional: negative for fever, chills, weight loss, night sweats   10 systems reviewed and negative other then HPI     Past Medical History:   Diagnosis Date    Swanson esophagus     small segment in 2014 Dr Hwang    Bradycardia     had negative stress test and nuclear imaging done in 2015    Chronic lower back pain     GERD (gastroesophageal reflux disease)     Glaucoma     H/O seasonal allergies     Heart murmur     High cholesterol     Hypertension     JUANITO on CPAP     Osteopenia     Vertigo         Past

## 2024-06-25 ENCOUNTER — TELEPHONE (OUTPATIENT)
Age: 74
End: 2024-06-25

## 2024-06-25 DIAGNOSIS — F51.01 PRIMARY INSOMNIA: Primary | ICD-10-CM

## 2024-06-25 LAB
25(OH)D3 SERPL-MCNC: 29.5 NG/ML (ref 30–100)
ALBUMIN SERPL-MCNC: 3.9 G/DL (ref 3.5–5)
ALBUMIN/GLOB SERPL: 1.3 (ref 1.1–2.2)
ALP SERPL-CCNC: 77 U/L (ref 45–117)
ALT SERPL-CCNC: 24 U/L (ref 12–78)
ANION GAP SERPL CALC-SCNC: 4 MMOL/L (ref 5–15)
AST SERPL-CCNC: 20 U/L (ref 15–37)
BASOPHILS # BLD: 0.1 K/UL (ref 0–0.1)
BASOPHILS NFR BLD: 1 % (ref 0–1)
BILIRUB SERPL-MCNC: 0.5 MG/DL (ref 0.2–1)
BUN SERPL-MCNC: 19 MG/DL (ref 6–20)
BUN/CREAT SERPL: 31 (ref 12–20)
CALCIUM SERPL-MCNC: 9.7 MG/DL (ref 8.5–10.1)
CHLORIDE SERPL-SCNC: 106 MMOL/L (ref 97–108)
CHOLEST SERPL-MCNC: 166 MG/DL
CO2 SERPL-SCNC: 30 MMOL/L (ref 21–32)
CREAT SERPL-MCNC: 0.61 MG/DL (ref 0.55–1.02)
DIFFERENTIAL METHOD BLD: NORMAL
EOSINOPHIL # BLD: 0.1 K/UL (ref 0–0.4)
EOSINOPHIL NFR BLD: 2 % (ref 0–7)
ERYTHROCYTE [DISTWIDTH] IN BLOOD BY AUTOMATED COUNT: 12.5 % (ref 11.5–14.5)
EST. AVERAGE GLUCOSE BLD GHB EST-MCNC: 117 MG/DL
GLOBULIN SER CALC-MCNC: 3.1 G/DL (ref 2–4)
GLUCOSE SERPL-MCNC: 98 MG/DL (ref 65–100)
HBA1C MFR BLD: 5.7 % (ref 4–5.6)
HCT VFR BLD AUTO: 41.1 % (ref 35–47)
HDLC SERPL-MCNC: 70 MG/DL
HDLC SERPL: 2.4 (ref 0–5)
HGB BLD-MCNC: 13.2 G/DL (ref 11.5–16)
IMM GRANULOCYTES # BLD AUTO: 0 K/UL (ref 0–0.04)
IMM GRANULOCYTES NFR BLD AUTO: 0 % (ref 0–0.5)
LDLC SERPL CALC-MCNC: 68.4 MG/DL (ref 0–100)
LYMPHOCYTES # BLD: 1.9 K/UL (ref 0.8–3.5)
LYMPHOCYTES NFR BLD: 35 % (ref 12–49)
MCH RBC QN AUTO: 30.6 PG (ref 26–34)
MCHC RBC AUTO-ENTMCNC: 32.1 G/DL (ref 30–36.5)
MCV RBC AUTO: 95.4 FL (ref 80–99)
MONOCYTES # BLD: 0.5 K/UL (ref 0–1)
MONOCYTES NFR BLD: 9 % (ref 5–13)
NEUTS SEG # BLD: 3 K/UL (ref 1.8–8)
NEUTS SEG NFR BLD: 53 % (ref 32–75)
NRBC # BLD: 0 K/UL (ref 0–0.01)
NRBC BLD-RTO: 0 PER 100 WBC
PLATELET # BLD AUTO: 298 K/UL (ref 150–400)
PMV BLD AUTO: 10.2 FL (ref 8.9–12.9)
POTASSIUM SERPL-SCNC: 3.6 MMOL/L (ref 3.5–5.1)
PROT SERPL-MCNC: 7 G/DL (ref 6.4–8.2)
RBC # BLD AUTO: 4.31 M/UL (ref 3.8–5.2)
SODIUM SERPL-SCNC: 140 MMOL/L (ref 136–145)
TRIGL SERPL-MCNC: 138 MG/DL
VLDLC SERPL CALC-MCNC: 27.6 MG/DL
WBC # BLD AUTO: 5.6 K/UL (ref 3.6–11)

## 2024-06-25 RX ORDER — TRAZODONE HYDROCHLORIDE 50 MG/1
25 TABLET ORAL NIGHTLY
Qty: 15 TABLET | Refills: 1 | Status: SHIPPED | OUTPATIENT
Start: 2024-06-25

## 2024-06-25 NOTE — TELEPHONE ENCOUNTER
----- Message from Tim Canada sent at 6/25/2024  9:14 AM EDT -----  Regarding: ECC Message to Provider  ECC Message to Provider    Relationship to Patient: Self     Additional Information Patient is requesting if her PCP's nurse - Sona Vital MD can order the sleeping pills that she recommended on June 24,2025 to be sent to [facility] pharmacy.   --------------------------------------------------------------------------------------------------------------------------    Call Back Information: OK to leave message on voicemail  Preferred Call Back Number: Phone [phone] (home)

## 2024-06-27 ENCOUNTER — HOSPITAL ENCOUNTER (OUTPATIENT)
Facility: HOSPITAL | Age: 74
Discharge: HOME | End: 2024-06-27
Attending: INTERNAL MEDICINE
Payer: MEDICARE

## 2024-06-27 VITALS — BODY MASS INDEX: 28.44 KG/M2 | HEIGHT: 62 IN | WEIGHT: 154.54 LBS

## 2024-06-27 DIAGNOSIS — Z12.31 ENCOUNTER FOR SCREENING MAMMOGRAM FOR MALIGNANT NEOPLASM OF BREAST: ICD-10-CM

## 2024-06-27 PROCEDURE — 77063 BREAST TOMOSYNTHESIS BI: CPT

## 2024-07-09 ENCOUNTER — NURSE ONLY (OUTPATIENT)
Age: 74
End: 2024-07-09

## 2024-07-09 DIAGNOSIS — F11.99 OPIOID USE, UNSPECIFIED WITH UNSPECIFIED OPIOID-INDUCED DISORDER (HCC): Primary | ICD-10-CM

## 2024-07-09 DIAGNOSIS — M47.26 OTHER SPONDYLOSIS WITH RADICULOPATHY, LUMBAR REGION: ICD-10-CM

## 2024-07-09 DIAGNOSIS — F11.99 OPIOID USE, UNSPECIFIED WITH UNSPECIFIED OPIOID-INDUCED DISORDER (HCC): ICD-10-CM

## 2024-07-11 LAB — DRUGS UR: NORMAL

## 2024-07-15 DIAGNOSIS — M48.062 SPINAL STENOSIS, LUMBAR REGION WITH NEUROGENIC CLAUDICATION: ICD-10-CM

## 2024-07-15 RX ORDER — TRAMADOL HYDROCHLORIDE 50 MG/1
50 TABLET ORAL EVERY 8 HOURS PRN
Qty: 30 TABLET | Refills: 0 | Status: SHIPPED | OUTPATIENT
Start: 2024-07-15 | End: 2024-08-14

## 2024-07-17 DIAGNOSIS — F51.01 PRIMARY INSOMNIA: ICD-10-CM

## 2024-07-19 RX ORDER — TRAZODONE HYDROCHLORIDE 50 MG/1
25 TABLET ORAL NIGHTLY
Qty: 45 TABLET | Refills: 1 | Status: SHIPPED | OUTPATIENT
Start: 2024-07-19

## 2024-09-16 DIAGNOSIS — K21.9 GASTROESOPHAGEAL REFLUX DISEASE WITHOUT ESOPHAGITIS: ICD-10-CM

## 2024-09-16 RX ORDER — OMEPRAZOLE 40 MG/1
40 CAPSULE, DELAYED RELEASE ORAL
Qty: 180 CAPSULE | Refills: 1 | Status: SHIPPED | OUTPATIENT
Start: 2024-09-16

## 2024-10-03 DIAGNOSIS — F51.01 PRIMARY INSOMNIA: ICD-10-CM

## 2024-10-03 RX ORDER — TRAZODONE HYDROCHLORIDE 50 MG/1
25 TABLET, FILM COATED ORAL NIGHTLY
Qty: 45 TABLET | Refills: 1 | Status: SHIPPED | OUTPATIENT
Start: 2024-10-03

## 2024-10-03 NOTE — TELEPHONE ENCOUNTER
PCP: Sona Reece MD    Last appt:   6/24/2024    Future Appointments   Date Time Provider Department Center   11/12/2024 11:15 AM [facility] 1 [facility]   1/14/2025 10:30 AM Sona Reece MD [facility] DEP       Requested Prescriptions     Pending Prescriptions Disp Refills    traZODone (DESYREL) 50 MG tablet 45 tablet 1     Sig: Take 0.5 tablets by mouth nightly

## 2024-10-03 NOTE — TELEPHONE ENCOUNTER
Patient called the office to get traZODone (DESYREL) 50 MG tablet [[id number]] refilled.    Pharmacy is confirmed

## 2024-10-18 DIAGNOSIS — M48.062 SPINAL STENOSIS, LUMBAR REGION WITH NEUROGENIC CLAUDICATION: ICD-10-CM

## 2024-10-18 RX ORDER — TRAMADOL HYDROCHLORIDE 50 MG/1
50 TABLET ORAL EVERY 8 HOURS PRN
Qty: 30 TABLET | Refills: 0 | Status: SHIPPED | OUTPATIENT
Start: 2024-10-18 | End: 2024-11-17

## 2024-10-29 DIAGNOSIS — G25.81 RESTLESS LEGS SYNDROME: ICD-10-CM

## 2024-10-29 DIAGNOSIS — M48.062 SPINAL STENOSIS OF LUMBAR REGION WITH NEUROGENIC CLAUDICATION: ICD-10-CM

## 2024-10-29 RX ORDER — GABAPENTIN 400 MG/1
400 CAPSULE ORAL DAILY
Qty: 90 CAPSULE | Refills: 0 | Status: SHIPPED | OUTPATIENT
Start: 2024-10-29 | End: 2025-01-27

## 2024-11-12 ENCOUNTER — HOSPITAL ENCOUNTER (OUTPATIENT)
Facility: HOSPITAL | Age: 74
Discharge: HOME | End: 2024-11-15
Attending: INTERNAL MEDICINE
Payer: MEDICARE

## 2024-11-12 DIAGNOSIS — Q24.9 CONGENITAL HEART DISEASE: ICD-10-CM

## 2024-11-12 PROCEDURE — 75561 CARDIAC MRI FOR MORPH W/DYE: CPT

## 2024-11-12 PROCEDURE — 6360000004 HC RX CONTRAST MEDICATION: Performed by: INTERNAL MEDICINE

## 2024-11-12 PROCEDURE — A9579 GAD-BASE MR CONTRAST NOS,1ML: HCPCS | Performed by: INTERNAL MEDICINE

## 2024-11-12 RX ADMIN — GADOTERIDOL 20 ML: 279.3 INJECTION, SOLUTION INTRAVENOUS at 12:07

## 2025-01-13 SDOH — ECONOMIC STABILITY: FOOD INSECURITY: WITHIN THE PAST 12 MONTHS, YOU WORRIED THAT YOUR FOOD WOULD RUN OUT BEFORE YOU GOT MONEY TO BUY MORE.: NEVER TRUE

## 2025-01-13 SDOH — HEALTH STABILITY: PHYSICAL HEALTH: ON AVERAGE, HOW MANY MINUTES DO YOU ENGAGE IN EXERCISE AT THIS LEVEL?: 20 MIN

## 2025-01-13 SDOH — HEALTH STABILITY: PHYSICAL HEALTH: ON AVERAGE, HOW MANY DAYS PER WEEK DO YOU ENGAGE IN MODERATE TO STRENUOUS EXERCISE (LIKE A BRISK WALK)?: 1 DAY

## 2025-01-13 SDOH — ECONOMIC STABILITY: FOOD INSECURITY: WITHIN THE PAST 12 MONTHS, THE FOOD YOU BOUGHT JUST DIDN'T LAST AND YOU DIDN'T HAVE MONEY TO GET MORE.: NEVER TRUE

## 2025-01-13 SDOH — ECONOMIC STABILITY: INCOME INSECURITY: IN THE LAST 12 MONTHS, WAS THERE A TIME WHEN YOU WERE NOT ABLE TO PAY THE MORTGAGE OR RENT ON TIME?: NO

## 2025-01-13 ASSESSMENT — PATIENT HEALTH QUESTIONNAIRE - PHQ9
SUM OF ALL RESPONSES TO PHQ QUESTIONS 1-9: 0
SUM OF ALL RESPONSES TO PHQ9 QUESTIONS 1 & 2: 0
2. FEELING DOWN, DEPRESSED OR HOPELESS: NOT AT ALL
1. LITTLE INTEREST OR PLEASURE IN DOING THINGS: NOT AT ALL

## 2025-01-13 ASSESSMENT — LIFESTYLE VARIABLES
HOW OFTEN DO YOU HAVE A DRINK CONTAINING ALCOHOL: MONTHLY OR LESS
HOW OFTEN DO YOU HAVE A DRINK CONTAINING ALCOHOL: 2
HOW MANY STANDARD DRINKS CONTAINING ALCOHOL DO YOU HAVE ON A TYPICAL DAY: 1 OR 2
HOW MANY STANDARD DRINKS CONTAINING ALCOHOL DO YOU HAVE ON A TYPICAL DAY: 1
HOW OFTEN DO YOU HAVE SIX OR MORE DRINKS ON ONE OCCASION: 1

## 2025-01-14 ENCOUNTER — OFFICE VISIT (OUTPATIENT)
Age: 75
End: 2025-01-14
Payer: MEDICARE

## 2025-01-14 VITALS
TEMPERATURE: 97.8 F | DIASTOLIC BLOOD PRESSURE: 82 MMHG | OXYGEN SATURATION: 97 % | WEIGHT: 153.2 LBS | SYSTOLIC BLOOD PRESSURE: 139 MMHG | BODY MASS INDEX: 28.19 KG/M2 | HEIGHT: 62 IN | RESPIRATION RATE: 18 BRPM | HEART RATE: 55 BPM

## 2025-01-14 DIAGNOSIS — G25.81 RESTLESS LEG SYNDROME: ICD-10-CM

## 2025-01-14 DIAGNOSIS — E78.00 HIGH CHOLESTEROL: ICD-10-CM

## 2025-01-14 DIAGNOSIS — R07.81 RIB PAIN: ICD-10-CM

## 2025-01-14 DIAGNOSIS — Z00.00 MEDICARE ANNUAL WELLNESS VISIT, SUBSEQUENT: Primary | ICD-10-CM

## 2025-01-14 DIAGNOSIS — F11.99 OPIOID USE, UNSPECIFIED WITH UNSPECIFIED OPIOID-INDUCED DISORDER (HCC): ICD-10-CM

## 2025-01-14 DIAGNOSIS — I10 ESSENTIAL (PRIMARY) HYPERTENSION: ICD-10-CM

## 2025-01-14 DIAGNOSIS — K21.9 GASTROESOPHAGEAL REFLUX DISEASE WITHOUT ESOPHAGITIS: ICD-10-CM

## 2025-01-14 DIAGNOSIS — G47.33 OSA ON CPAP: ICD-10-CM

## 2025-01-14 DIAGNOSIS — M48.062 SPINAL STENOSIS, LUMBAR REGION WITH NEUROGENIC CLAUDICATION: ICD-10-CM

## 2025-01-14 DIAGNOSIS — I10 PRIMARY HYPERTENSION: ICD-10-CM

## 2025-01-14 PROCEDURE — 1159F MED LIST DOCD IN RCRD: CPT | Performed by: INTERNAL MEDICINE

## 2025-01-14 PROCEDURE — 3075F SYST BP GE 130 - 139MM HG: CPT | Performed by: INTERNAL MEDICINE

## 2025-01-14 PROCEDURE — G0439 PPPS, SUBSEQ VISIT: HCPCS | Performed by: INTERNAL MEDICINE

## 2025-01-14 PROCEDURE — 1160F RVW MEDS BY RX/DR IN RCRD: CPT | Performed by: INTERNAL MEDICINE

## 2025-01-14 PROCEDURE — 3079F DIAST BP 80-89 MM HG: CPT | Performed by: INTERNAL MEDICINE

## 2025-01-14 PROCEDURE — 1123F ACP DISCUSS/DSCN MKR DOCD: CPT | Performed by: INTERNAL MEDICINE

## 2025-01-14 RX ORDER — GABAPENTIN 100 MG/1
CAPSULE ORAL
Qty: 30 CAPSULE | Refills: 0 | Status: SHIPPED | OUTPATIENT
Start: 2025-01-14 | End: 2025-02-18

## 2025-01-14 RX ORDER — PRAMIPEXOLE DIHYDROCHLORIDE 0.12 MG/1
0.12 TABLET ORAL NIGHTLY
Qty: 90 TABLET | Refills: 3 | Status: SHIPPED | OUTPATIENT
Start: 2025-01-14

## 2025-01-14 RX ORDER — FAMOTIDINE 40 MG/1
40 TABLET, FILM COATED ORAL
Qty: 30 TABLET | Refills: 3 | Status: SHIPPED | OUTPATIENT
Start: 2025-01-14

## 2025-01-14 NOTE — PROGRESS NOTES
\"Have you been to the ER, urgent care clinic since your last visit?  Hospitalized since your last visit?\"    Yes, [facility] for URI    “Have you seen or consulted any other health care providers outside our system since your last visit?”    Yes, [facility]

## 2025-01-14 NOTE — PROGRESS NOTES
Medicare Annual Wellness Visit    Christina Odonnell is here for Medicare AWV    Assessment & Plan    Assessment & Plan  1. Recent URI  She recently completed a 10-day course of doxycycline prescribed by urgent care for a severe cough and crackles in the lower left lobe. She received an aerosol treatment at urgent care, which improved her lung sounds. She has had her RSV vaccine.    2. Rib pain.  She reports persistent pain in the right ribs since November 2024, possibly due to a cracked rib from coughing. An x-ray of the right ribs will be ordered to evaluate for any fractures.    3. Gastroesophageal reflux disease.  She underwent an endoscopy on 11/06/2024, which showed no significant findings. She is on 40mg of omeprazole daily . She is advised to transition to famotidine 40 mg, to be taken 30 minutes before breakfast for 30 days. If symptoms persist, she should inform the provider.    4. Arrhythmias.  She has a history of arrhythmias A recent cardiac MRI showed normal findings, including a normal mitral valve, trileaflet aortic valve, tricuspid valve, and pulmonic valve, with an ejection fraction of 65%.    5. Sleep apnea.  She is currently using a CPAP machine and has a follow-up appointment with Dr. Olivas in February 2025.    6. Restless legs syndrome.  She is currently on gabapentin 500 mg once at night but reports it is no longer effective. She is advised to reduce caffeine intake and massage her legs. A prescription for Mirapex 0.125 mg at night has been provided. She is advised to discontinue gabapentin and monitor her symptoms. If there is no change, she should try Mirapex. Potential side effects of Mirapex, including constipation, dizziness, and drowsiness, have been discussed. She is advised to take iron supplements with orange juice or an orange. A prescription for gabapentin 100 mg has been provided to facilitate weaning. She is instructed to take 3 pills at bedtime for 4 days, then 2 pills for 4 days,

## 2025-01-15 RX ORDER — OLMESARTAN MEDOXOMIL AND HYDROCHLOROTHIAZIDE 40/12.5 40; 12.5 MG/1; MG/1
1 TABLET ORAL DAILY
Qty: 90 TABLET | Refills: 1 | Status: SHIPPED | OUTPATIENT
Start: 2025-01-15

## 2025-01-15 RX ORDER — ROSUVASTATIN CALCIUM 10 MG/1
10 TABLET, COATED ORAL DAILY
Qty: 90 TABLET | Refills: 1 | Status: SHIPPED | OUTPATIENT
Start: 2025-01-15

## 2025-01-15 RX ORDER — TRAMADOL HYDROCHLORIDE 50 MG/1
50 TABLET ORAL EVERY 8 HOURS PRN
Qty: 28 TABLET | Refills: 1 | Status: SHIPPED | OUTPATIENT
Start: 2025-01-15 | End: 2025-02-14

## 2025-01-16 LAB
ALBUMIN SERPL-MCNC: 3.8 G/DL (ref 3.5–5)
ALBUMIN/GLOB SERPL: 1.4 (ref 1.1–2.2)
ALP SERPL-CCNC: 67 U/L (ref 45–117)
ALT SERPL-CCNC: 24 U/L (ref 12–78)
ANION GAP SERPL CALC-SCNC: 5 MMOL/L (ref 2–12)
AST SERPL-CCNC: 17 U/L (ref 15–37)
BASOPHILS # BLD: 0.06 K/UL (ref 0–0.1)
BASOPHILS NFR BLD: 1.1 % (ref 0–1)
BILIRUB SERPL-MCNC: 0.5 MG/DL (ref 0.2–1)
BUN SERPL-MCNC: 14 MG/DL (ref 6–20)
BUN/CREAT SERPL: 24 (ref 12–20)
CALCIUM SERPL-MCNC: 9.7 MG/DL (ref 8.5–10.1)
CHLORIDE SERPL-SCNC: 105 MMOL/L (ref 97–108)
CO2 SERPL-SCNC: 30 MMOL/L (ref 21–32)
CREAT SERPL-MCNC: 0.59 MG/DL (ref 0.55–1.02)
DIFFERENTIAL METHOD BLD: ABNORMAL
EOSINOPHIL # BLD: 0.11 K/UL (ref 0–0.4)
EOSINOPHIL NFR BLD: 2 % (ref 0–7)
ERYTHROCYTE [DISTWIDTH] IN BLOOD BY AUTOMATED COUNT: 11.9 % (ref 11.5–14.5)
GLOBULIN SER CALC-MCNC: 2.8 G/DL (ref 2–4)
GLUCOSE SERPL-MCNC: 97 MG/DL (ref 65–100)
HCT VFR BLD AUTO: 39.9 % (ref 35–47)
HGB BLD-MCNC: 13.1 G/DL (ref 11.5–16)
IMM GRANULOCYTES # BLD AUTO: 0.02 K/UL (ref 0–0.04)
IMM GRANULOCYTES NFR BLD AUTO: 0.4 % (ref 0–0.5)
LYMPHOCYTES # BLD: 1.76 K/UL (ref 0.8–3.5)
LYMPHOCYTES NFR BLD: 31.8 % (ref 12–49)
MCH RBC QN AUTO: 30.8 PG (ref 26–34)
MCHC RBC AUTO-ENTMCNC: 32.8 G/DL (ref 30–36.5)
MCV RBC AUTO: 93.7 FL (ref 80–99)
MONOCYTES # BLD: 0.44 K/UL (ref 0–1)
MONOCYTES NFR BLD: 7.9 % (ref 5–13)
NEUTS SEG # BLD: 3.15 K/UL (ref 1.8–8)
NEUTS SEG NFR BLD: 56.8 % (ref 32–75)
NRBC # BLD: 0 K/UL (ref 0–0.01)
NRBC BLD-RTO: 0 PER 100 WBC
PLATELET # BLD AUTO: 335 K/UL (ref 150–400)
PMV BLD AUTO: 10 FL (ref 8.9–12.9)
POTASSIUM SERPL-SCNC: 4.1 MMOL/L (ref 3.5–5.1)
PROT SERPL-MCNC: 6.6 G/DL (ref 6.4–8.2)
RBC # BLD AUTO: 4.26 M/UL (ref 3.8–5.2)
SODIUM SERPL-SCNC: 140 MMOL/L (ref 136–145)
WBC # BLD AUTO: 5.5 K/UL (ref 3.6–11)

## 2025-01-29 ENCOUNTER — HOSPITAL ENCOUNTER (OUTPATIENT)
Facility: HOSPITAL | Age: 75
Discharge: HOME | End: 2025-02-01
Payer: MEDICARE

## 2025-01-29 DIAGNOSIS — R07.81 RIB PAIN: ICD-10-CM

## 2025-01-29 PROCEDURE — 71101 X-RAY EXAM UNILAT RIBS/CHEST: CPT

## 2025-02-05 ENCOUNTER — TELEPHONE (OUTPATIENT)
Age: 75
End: 2025-02-05

## 2025-02-05 DIAGNOSIS — J90 BILATERAL PLEURAL EFFUSION: Primary | ICD-10-CM

## 2025-02-05 NOTE — TELEPHONE ENCOUNTER
Pt got x ray results in my chart.  She is asking for a call back to go over those and what the next step is.     Please call pt.

## 2025-02-21 ENCOUNTER — HOSPITAL ENCOUNTER (OUTPATIENT)
Facility: HOSPITAL | Age: 75
Discharge: HOME | End: 2025-02-24
Payer: MEDICARE

## 2025-02-21 DIAGNOSIS — J90 BILATERAL PLEURAL EFFUSION: ICD-10-CM

## 2025-02-21 PROCEDURE — 71046 X-RAY EXAM CHEST 2 VIEWS: CPT

## 2025-03-24 ENCOUNTER — TELEPHONE (OUTPATIENT)
Age: 75
End: 2025-03-24

## 2025-03-24 NOTE — TELEPHONE ENCOUNTER
Pt states that she started the other medication after stopping the gabapentin.  It is not helping her restless legs. She states it alters her mood and she would like to go back on the gabapentin 400 MG     Pt is also asking for Tramadol 50 mg to go in for her back after working outside.     Please send both scripts  to [facility] #[phone]    She states she was taking 400 mg and would like to get that going again.     Please call pt with any questions.

## 2025-04-11 ENCOUNTER — OFFICE VISIT (OUTPATIENT)
Age: 75
End: 2025-04-11
Payer: MEDICARE

## 2025-04-11 VITALS
TEMPERATURE: 97.8 F | WEIGHT: 148 LBS | BODY MASS INDEX: 27.23 KG/M2 | HEART RATE: 65 BPM | DIASTOLIC BLOOD PRESSURE: 79 MMHG | HEIGHT: 62 IN | SYSTOLIC BLOOD PRESSURE: 122 MMHG | OXYGEN SATURATION: 97 % | RESPIRATION RATE: 18 BRPM

## 2025-04-11 DIAGNOSIS — M48.062 SPINAL STENOSIS, LUMBAR REGION WITH NEUROGENIC CLAUDICATION: Primary | ICD-10-CM

## 2025-04-11 DIAGNOSIS — G25.81 RESTLESS LEG SYNDROME: ICD-10-CM

## 2025-04-11 DIAGNOSIS — I10 PRIMARY HYPERTENSION: ICD-10-CM

## 2025-04-11 DIAGNOSIS — R73.03 PRE-DIABETES: ICD-10-CM

## 2025-04-11 DIAGNOSIS — G47.33 OSA ON CPAP: ICD-10-CM

## 2025-04-11 LAB
ANION GAP SERPL CALC-SCNC: 5 MMOL/L (ref 2–12)
BUN SERPL-MCNC: 12 MG/DL (ref 6–20)
BUN/CREAT SERPL: 20 (ref 12–20)
CALCIUM SERPL-MCNC: 9.7 MG/DL (ref 8.5–10.1)
CHLORIDE SERPL-SCNC: 106 MMOL/L (ref 97–108)
CO2 SERPL-SCNC: 30 MMOL/L (ref 21–32)
CREAT SERPL-MCNC: 0.6 MG/DL (ref 0.55–1.02)
EST. AVERAGE GLUCOSE BLD GHB EST-MCNC: 120 MG/DL
GLUCOSE SERPL-MCNC: 104 MG/DL (ref 65–100)
HBA1C MFR BLD: 5.8 % (ref 4–5.6)
POTASSIUM SERPL-SCNC: 4.2 MMOL/L (ref 3.5–5.1)
SODIUM SERPL-SCNC: 141 MMOL/L (ref 136–145)

## 2025-04-11 PROCEDURE — 1159F MED LIST DOCD IN RCRD: CPT | Performed by: INTERNAL MEDICINE

## 2025-04-11 PROCEDURE — 1123F ACP DISCUSS/DSCN MKR DOCD: CPT | Performed by: INTERNAL MEDICINE

## 2025-04-11 PROCEDURE — 99214 OFFICE O/P EST MOD 30 MIN: CPT | Performed by: INTERNAL MEDICINE

## 2025-04-11 PROCEDURE — 3074F SYST BP LT 130 MM HG: CPT | Performed by: INTERNAL MEDICINE

## 2025-04-11 PROCEDURE — 3078F DIAST BP <80 MM HG: CPT | Performed by: INTERNAL MEDICINE

## 2025-04-11 RX ORDER — GABAPENTIN 400 MG/1
400 CAPSULE ORAL EVERY EVENING
Qty: 90 CAPSULE | Refills: 0 | Status: SHIPPED | OUTPATIENT
Start: 2025-04-11 | End: 2025-07-11

## 2025-04-11 RX ORDER — VITAMIN B COMPLEX
1 CAPSULE ORAL DAILY
COMMUNITY

## 2025-04-11 RX ORDER — TRAMADOL HYDROCHLORIDE 50 MG/1
50 TABLET ORAL EVERY 8 HOURS PRN
Qty: 30 TABLET | Refills: 0 | Status: SHIPPED | OUTPATIENT
Start: 2025-04-11 | End: 2025-04-25

## 2025-04-11 RX ORDER — OLMESARTAN MEDOXOMIL AND HYDROCHLOROTHIAZIDE 40/25 40; 25 MG/1; MG/1
1 TABLET ORAL DAILY
COMMUNITY

## 2025-04-11 NOTE — PROGRESS NOTES
Christina Odonnell (:  1950) is a 74 y.o. female, Established patient, here for evaluation of the following chief complaint(s):  Medication Check (Check BP meds, get back on gabapentin and re-up tramdol)         Assessment & Plan    1 Restless legs syndrome.  Symptoms have returned after discontinuing gabapentin; requests to resume gabapentin 400 mg at night.  Potential side effects, including weight gain and increased hunger, were discussed.  Gabapentin 400 mg to be taken at night will be prescribed.  Gabapentin may also help with nerve pain due to spinal stenosis.    2  Hypertension.  Blood pressure readings have been within the normal range, except for a few isolated instances of elevated systolic readings.  Cardiac MRI from 2024 revealed a normal left ventricular cavity, moderate septal hypertrophy, normal right ventricular function, and no significant valve disease.  BMP will be ordered to assess kidney function due to changes in hydrochlorothiazide dosage.  If fasting, a cholesterol panel will be added to the BMP order. If not fasting, only BMP and hemoglobin A1c will be checked.    3 Acid reflux.  Symptoms have improved since discontinuing aspirin and taking Pepcid 40 mg.  Endoscopy results were normal, and no further follow-up is required.  Continue current management with Pepcid 40 mg.  No additional treatment needed at this time.    4, Spinal stenosis and back pain: takes tramadol when pain is severe. Prescribed #30 today. No hx of abuse or overuse.    5. High cholesterol: on crestor    6. Restless leg: resume gabapentin      7. Sleep apnea.  She is currently using a CPAP machine and has a follow-up appointment with Dr. Olivas in 2025.    8. Osteopenia.  She has a history of osteopenia. She is advised to exercise daily for 30 minutes, including walking and strength exercises for the hips and pelvic area. She is currently taking vitamin D 1000 units daily.    9. Health maintenance.  She

## 2025-04-12 ENCOUNTER — RESULTS FOLLOW-UP (OUTPATIENT)
Age: 75
End: 2025-04-12

## 2025-04-15 DIAGNOSIS — K21.9 GASTROESOPHAGEAL REFLUX DISEASE WITHOUT ESOPHAGITIS: ICD-10-CM

## 2025-04-15 RX ORDER — FAMOTIDINE 40 MG/1
40 TABLET, FILM COATED ORAL
Qty: 90 TABLET | Refills: 1 | Status: SHIPPED | OUTPATIENT
Start: 2025-04-15

## 2025-06-30 ENCOUNTER — TRANSCRIBE ORDERS (OUTPATIENT)
Facility: HOSPITAL | Age: 75
End: 2025-06-30

## 2025-06-30 DIAGNOSIS — Z12.31 ENCOUNTER FOR SCREENING MAMMOGRAM FOR MALIGNANT NEOPLASM OF BREAST: Primary | ICD-10-CM

## 2025-07-21 ENCOUNTER — OFFICE VISIT (OUTPATIENT)
Age: 75
End: 2025-07-21
Payer: MEDICARE

## 2025-07-21 VITALS
DIASTOLIC BLOOD PRESSURE: 78 MMHG | HEART RATE: 71 BPM | SYSTOLIC BLOOD PRESSURE: 132 MMHG | WEIGHT: 150 LBS | TEMPERATURE: 97.4 F | OXYGEN SATURATION: 99 % | RESPIRATION RATE: 18 BRPM | HEIGHT: 62 IN | BODY MASS INDEX: 27.6 KG/M2

## 2025-07-21 DIAGNOSIS — F51.01 PRIMARY INSOMNIA: ICD-10-CM

## 2025-07-21 DIAGNOSIS — R73.03 PRE-DIABETES: ICD-10-CM

## 2025-07-21 DIAGNOSIS — G47.33 OSA ON CPAP: ICD-10-CM

## 2025-07-21 DIAGNOSIS — L25.9 CONTACT DERMATITIS, UNSPECIFIED CONTACT DERMATITIS TYPE, UNSPECIFIED TRIGGER: Primary | ICD-10-CM

## 2025-07-21 DIAGNOSIS — M48.062 SPINAL STENOSIS, LUMBAR REGION WITH NEUROGENIC CLAUDICATION: ICD-10-CM

## 2025-07-21 DIAGNOSIS — G25.81 RESTLESS LEG SYNDROME: ICD-10-CM

## 2025-07-21 DIAGNOSIS — E78.00 HIGH CHOLESTEROL: ICD-10-CM

## 2025-07-21 DIAGNOSIS — I10 ESSENTIAL (PRIMARY) HYPERTENSION: ICD-10-CM

## 2025-07-21 LAB
ALBUMIN SERPL-MCNC: 3.9 G/DL (ref 3.5–5)
ALBUMIN/GLOB SERPL: 1.3 (ref 1.1–2.2)
ALP SERPL-CCNC: 56 U/L (ref 45–117)
ALT SERPL-CCNC: 36 U/L (ref 12–78)
ANION GAP SERPL CALC-SCNC: 5 MMOL/L (ref 2–12)
AST SERPL-CCNC: 24 U/L (ref 15–37)
BASOPHILS # BLD: 0.05 K/UL (ref 0–0.1)
BASOPHILS NFR BLD: 1.1 % (ref 0–1)
BILIRUB SERPL-MCNC: 0.3 MG/DL (ref 0.2–1)
BUN SERPL-MCNC: 17 MG/DL (ref 6–20)
BUN/CREAT SERPL: 29 (ref 12–20)
CALCIUM SERPL-MCNC: 9.5 MG/DL (ref 8.5–10.1)
CHLORIDE SERPL-SCNC: 105 MMOL/L (ref 97–108)
CHOLEST SERPL-MCNC: 164 MG/DL
CO2 SERPL-SCNC: 31 MMOL/L (ref 21–32)
CREAT SERPL-MCNC: 0.59 MG/DL (ref 0.55–1.02)
DIFFERENTIAL METHOD BLD: ABNORMAL
EOSINOPHIL # BLD: 0.11 K/UL (ref 0–0.4)
EOSINOPHIL NFR BLD: 2.4 % (ref 0–7)
ERYTHROCYTE [DISTWIDTH] IN BLOOD BY AUTOMATED COUNT: 11.8 % (ref 11.5–14.5)
GLOBULIN SER CALC-MCNC: 2.9 G/DL (ref 2–4)
GLUCOSE SERPL-MCNC: 98 MG/DL (ref 65–100)
HCT VFR BLD AUTO: 40.9 % (ref 35–47)
HDLC SERPL-MCNC: 75 MG/DL
HDLC SERPL: 2.2 (ref 0–5)
HGB BLD-MCNC: 13 G/DL (ref 11.5–16)
IMM GRANULOCYTES # BLD AUTO: 0.01 K/UL (ref 0–0.04)
IMM GRANULOCYTES NFR BLD AUTO: 0.2 % (ref 0–0.5)
LDLC SERPL CALC-MCNC: 61.8 MG/DL (ref 0–100)
LYMPHOCYTES # BLD: 1.52 K/UL (ref 0.8–3.5)
LYMPHOCYTES NFR BLD: 33.9 % (ref 12–49)
MCH RBC QN AUTO: 31.3 PG (ref 26–34)
MCHC RBC AUTO-ENTMCNC: 31.8 G/DL (ref 30–36.5)
MCV RBC AUTO: 98.3 FL (ref 80–99)
MONOCYTES # BLD: 0.44 K/UL (ref 0–1)
MONOCYTES NFR BLD: 9.8 % (ref 5–13)
NEUTS SEG # BLD: 2.36 K/UL (ref 1.8–8)
NEUTS SEG NFR BLD: 52.6 % (ref 32–75)
NRBC # BLD: 0 K/UL (ref 0–0.01)
NRBC BLD-RTO: 0 PER 100 WBC
PLATELET # BLD AUTO: 284 K/UL (ref 150–400)
PMV BLD AUTO: 9.7 FL (ref 8.9–12.9)
POTASSIUM SERPL-SCNC: 3.6 MMOL/L (ref 3.5–5.1)
PROT SERPL-MCNC: 6.8 G/DL (ref 6.4–8.2)
RBC # BLD AUTO: 4.16 M/UL (ref 3.8–5.2)
SODIUM SERPL-SCNC: 141 MMOL/L (ref 136–145)
TRIGL SERPL-MCNC: 136 MG/DL
VLDLC SERPL CALC-MCNC: 27.2 MG/DL
WBC # BLD AUTO: 4.5 K/UL (ref 3.6–11)

## 2025-07-21 PROCEDURE — 1123F ACP DISCUSS/DSCN MKR DOCD: CPT | Performed by: INTERNAL MEDICINE

## 2025-07-21 PROCEDURE — 3078F DIAST BP <80 MM HG: CPT | Performed by: INTERNAL MEDICINE

## 2025-07-21 PROCEDURE — 99214 OFFICE O/P EST MOD 30 MIN: CPT | Performed by: INTERNAL MEDICINE

## 2025-07-21 PROCEDURE — 1159F MED LIST DOCD IN RCRD: CPT | Performed by: INTERNAL MEDICINE

## 2025-07-21 PROCEDURE — 3075F SYST BP GE 130 - 139MM HG: CPT | Performed by: INTERNAL MEDICINE

## 2025-07-21 RX ORDER — SUVOREXANT 10 MG/1
10 TABLET, FILM COATED ORAL NIGHTLY PRN
Qty: 30 TABLET | Refills: 0 | Status: SHIPPED | OUTPATIENT
Start: 2025-07-21 | End: 2025-09-19

## 2025-07-21 RX ORDER — TRAMADOL HYDROCHLORIDE 50 MG/1
50 TABLET ORAL EVERY 6 HOURS PRN
Qty: 30 TABLET | Refills: 0 | Status: SHIPPED | OUTPATIENT
Start: 2025-07-21 | End: 2025-08-20

## 2025-07-21 RX ORDER — TRIAMCINOLONE ACETONIDE 1 MG/G
OINTMENT TOPICAL 2 TIMES DAILY
Qty: 1 EACH | Refills: 1 | Status: SHIPPED | OUTPATIENT
Start: 2025-07-21 | End: 2025-07-28

## 2025-07-21 RX ORDER — GABAPENTIN 400 MG/1
400 CAPSULE ORAL EVERY EVENING
Qty: 90 CAPSULE | Refills: 0 | Status: SHIPPED | OUTPATIENT
Start: 2025-07-21 | End: 2025-10-20

## 2025-07-21 NOTE — PROGRESS NOTES
Christina Odonnell (:  1950) is a 74 y.o. female, Established patient, here for evaluation of the following chief complaint(s):  Hypertension and Sleep Problem         Assessment & Plan  1. Ankle sprain remote  - Symptoms suggest a sprain rather than a fracture, as she was able to mobilize post-injury  - Physical exam reveals lateral malleolus pain upon movement  - Physical therapy recommended to strengthen the ankle patient declined    2. Restless legs syndrome  - Symptoms include significant discomfort and disrupted sleep  - Currently on gabapentin 400 mg, which helps manage her symptoms  - No changes to medication regimen necessary at this time  - failed mirapex    3. Hypertension  - controlled  - Currently taking olmesartan 40 mg with hydrochlorothiazide 12.5 mg  - BMP today    4. Gastroesophageal reflux disease (GERD)  - Symptoms exacerbated by dietary choices, specifically frequent consumption of tomatoes  - Advised to maintain a GERD-friendly diet and continue taking Pepcid as needed    5. Contact dermatitis  - Symptoms include itching and scabbing, with recent onset about a month ago  - Physical exam confirms contact dermatitis  - triamcinolone prescribed   - Advised to use hypoallergenic soaps and detergents and avoid perfumes  - call if symptoms do not improve    6. Anxiety  - Symptoms include significant stress related to managing a food pantry and disrupted sleep  - Encouraged to minimize stress and practice good sleep hygiene  - Discussed impact of anxiety on overall health and sleep patterns  - No medications prescribed for anxiety at this time    7. Insomnia  - Symptoms include difficulty sleeping, averaging 3-4 hours per night  - Belsomra 10 mg will be prescribed, with instructions to take half a tablet on the first night  - Advised to use this medication only as needed and not every night  - Discussed previous ineffective sleep aids and potential benefit of Belsomra    Results  - Labs:    -

## 2025-07-21 NOTE — PATIENT INSTRUCTIONS
Stronger cream for contact dermatitis prescribed    Try debrox over the counter 4- days at least debrox

## 2025-07-24 ENCOUNTER — HOSPITAL ENCOUNTER (OUTPATIENT)
Facility: HOSPITAL | Age: 75
Discharge: HOME | End: 2025-07-27
Attending: INTERNAL MEDICINE
Payer: MEDICARE

## 2025-07-24 DIAGNOSIS — Z12.31 ENCOUNTER FOR SCREENING MAMMOGRAM FOR MALIGNANT NEOPLASM OF BREAST: ICD-10-CM

## 2025-07-24 PROCEDURE — 77063 BREAST TOMOSYNTHESIS BI: CPT

## 2025-07-29 DIAGNOSIS — E78.00 HIGH CHOLESTEROL: ICD-10-CM

## 2025-07-29 RX ORDER — ROSUVASTATIN CALCIUM 10 MG/1
10 TABLET, COATED ORAL DAILY
Qty: 90 TABLET | Refills: 1 | Status: SHIPPED | OUTPATIENT
Start: 2025-07-29

## 2025-08-23 ENCOUNTER — OFFICE VISIT (OUTPATIENT)
Age: 75
End: 2025-08-23

## 2025-08-23 VITALS
SYSTOLIC BLOOD PRESSURE: 122 MMHG | RESPIRATION RATE: 16 BRPM | DIASTOLIC BLOOD PRESSURE: 67 MMHG | WEIGHT: 154 LBS | BODY MASS INDEX: 28.17 KG/M2 | OXYGEN SATURATION: 96 % | TEMPERATURE: 98.6 F | HEART RATE: 68 BPM

## 2025-08-23 DIAGNOSIS — R05.1 ACUTE COUGH: Primary | ICD-10-CM

## 2025-08-23 LAB
Lab: NORMAL
PERFORMING INSTRUMENT: NORMAL
QC PASS/FAIL: NORMAL
SARS-COV-2, POC: NORMAL

## 2025-08-23 RX ORDER — AZITHROMYCIN 250 MG/1
TABLET, FILM COATED ORAL
Qty: 6 TABLET | Refills: 0 | Status: SHIPPED | OUTPATIENT
Start: 2025-08-23 | End: 2025-09-02

## 2025-08-23 RX ORDER — DEXTROMETHORPHAN HYDROBROMIDE AND PROMETHAZINE HYDROCHLORIDE 15; 6.25 MG/5ML; MG/5ML
5 SYRUP ORAL 4 TIMES DAILY PRN
Qty: 118 ML | Refills: 0 | Status: SHIPPED | OUTPATIENT
Start: 2025-08-23 | End: 2025-08-30

## 2025-08-26 ENCOUNTER — TELEPHONE (OUTPATIENT)
Age: 75
End: 2025-08-26

## 2025-09-02 ENCOUNTER — TELEPHONE (OUTPATIENT)
Age: 75
End: 2025-09-02

## 2025-09-02 DIAGNOSIS — G25.81 RESTLESS LEG SYNDROME: ICD-10-CM

## 2025-09-02 DIAGNOSIS — M48.062 SPINAL STENOSIS, LUMBAR REGION WITH NEUROGENIC CLAUDICATION: ICD-10-CM

## 2025-09-03 RX ORDER — GABAPENTIN 400 MG/1
400 CAPSULE ORAL EVERY EVENING
Qty: 90 CAPSULE | Refills: 1 | Status: SHIPPED | OUTPATIENT
Start: 2025-10-22 | End: 2026-04-20

## (undated) DEVICE — BANDAGE,GAUZE,CONFORMING,3"X75",STRL,LF: Brand: MEDLINE

## (undated) DEVICE — SOLUTION IV STRL H2O 500 ML AQUALITE POUR BTL

## (undated) DEVICE — SYRINGE INSULIN 1ML LUERSLIP TIP W/O SFTY U100 BLISTER PK

## (undated) DEVICE — Device

## (undated) DEVICE — SURGICAL PROCEDURE PACK CATRCT CUST

## (undated) DEVICE — EXTREMITY III-LF: Brand: MEDLINE INDUSTRIES, INC.

## (undated) DEVICE — NDL FLTR TIP 5 MIC 18GX1.5IN --

## (undated) DEVICE — SYR 3ML LL TIP 1/10ML GRAD --

## (undated) DEVICE — SUTURE ETHLN SZ 4-0 L18IN NONABSORBABLE BLK L19MM PS-2 3/8 1667H

## (undated) DEVICE — PREP SKN CHLRAPRP APL 26ML STR --

## (undated) DEVICE — CONTINU-FLO SOLUTION SET, 2 INJECTION SITES, MALE LUER LOCK ADAPTER WITH RETRACTABLE COLLAR, LARGE BORE STOPCOCK WITH ROTATING MALE LUER LOCK EXTENSION SET, 2 INJECTION SITES, MALE LUER LOCK ADAPTER WITH RETRACTABLE COLLAR: Brand: INTERLINK/CONTINU-FLO

## (undated) DEVICE — (D)SYR 10ML 1/5ML GRAD NSAF -- PKGING CHANGE USE ITEM 338027

## (undated) DEVICE — SUTURE VCRL SZ 4-0 L27IN ABSRB UD L26MM SH 1/2 CIR J415H

## (undated) DEVICE — KENDALL DL ECG CABLE AND LEAD WIRE SYSTEM, 3-LEAD, SINGLE PATIENT USE: Brand: KENDALL

## (undated) DEVICE — DRSG GZ OIL EMUL CURAD 3X3 --

## (undated) DEVICE — EYE PADSSTERILENOT MADE WITH NATURAL RUBBER LATEXSINGLE USE ONLYDO NOT USE IF PACKAGE OPENED OR DAMAGED: Brand: CARDINAL HEALTH

## (undated) DEVICE — SYR 5ML 1/5 GRAD LL NSAF LF --

## (undated) DEVICE — ICE PK EYE 4 1/2INX10IN (15/BX 2BX/CS

## (undated) DEVICE — STERILE POLYISOPRENE POWDER-FREE SURGICAL GLOVES: Brand: PROTEXIS

## (undated) DEVICE — COVER LT HNDL PLAS RIG 1 PER PK

## (undated) DEVICE — NEEDLE HYPO 25GA L1.5IN BVL ORIENTED ECLIPSE

## (undated) DEVICE — 3M™ TEGADERM™ TRANSPARENT FILM DRESSING FRAME STYLE, 1624W, 2-3/8 IN X 2-3/4 IN (6 CM X 7 CM), 100/CT 4CT/CASE: Brand: 3M™ TEGADERM™

## (undated) DEVICE — C-ARM: Brand: UNBRANDED

## (undated) DEVICE — INTENDED FOR TISSUE SEPARATION, AND OTHER PROCEDURES THAT REQUIRE A SHARP SURGICAL BLADE TO PUNCTURE OR CUT.: Brand: BARD-PARKER ® CARBON RIB-BACK BLADES

## (undated) DEVICE — ZIMMER® STERILE DISPOSABLE TOURNIQUET CUFF WITH PLC, DUAL PORT, SINGLE BLADDER, 18 IN. (46 CM)

## (undated) DEVICE — THE MONARCH® "D" CARTRIDGE IS A SINGLE-USE POLYPROPYLENE CARTRIDGE FOR POSTERIOR CHAMBER IOL DELIVERY: Brand: MONARCH® III

## (undated) DEVICE — HIGH FLOW RATE EXTENSION SET, LUER LOCK ADAPTERS

## (undated) DEVICE — SOLUTION IV 250ML 0.9% SOD CHL CLR INJ FLX BG CONT PRT CLSR

## (undated) DEVICE — BLADE SAW W7XL18.5MM THK0.51MM CUT THK0.56MM REPL S STL SAG

## (undated) DEVICE — BANDAGE COBAN 4 IN COMPR W4INXL5YD FOAM COHESIVE QUIK STK SELF ADH SFT

## (undated) DEVICE — BANDAGE,GAUZE,BULKEE II,4.5"X4.1YD,STRL: Brand: MEDLINE

## (undated) DEVICE — SUTURE VCRL SZ 2-0 L27IN ABSRB UD L26MM SH 1/2 CIR J417H

## (undated) DEVICE — SOLUTION IRRIG 1000ML 0.9% SOD CHL USP POUR PLAS BTL

## (undated) DEVICE — 4.0MM EGG

## (undated) DEVICE — ROCKER SWITCH PENCIL BLADE ELECTRODE, HOLSTER: Brand: EDGE